# Patient Record
Sex: FEMALE | Race: WHITE | Employment: FULL TIME | URBAN - NONMETROPOLITAN AREA
[De-identification: names, ages, dates, MRNs, and addresses within clinical notes are randomized per-mention and may not be internally consistent; named-entity substitution may affect disease eponyms.]

---

## 2017-11-07 ENCOUNTER — OFFICE VISIT (OUTPATIENT)
Dept: INTERNAL MEDICINE CLINIC | Age: 27
End: 2017-11-07

## 2017-11-07 VITALS
TEMPERATURE: 97.8 F | WEIGHT: 215.2 LBS | OXYGEN SATURATION: 98 % | RESPIRATION RATE: 16 BRPM | SYSTOLIC BLOOD PRESSURE: 120 MMHG | BODY MASS INDEX: 35.81 KG/M2 | DIASTOLIC BLOOD PRESSURE: 80 MMHG | HEART RATE: 80 BPM

## 2017-11-07 DIAGNOSIS — J20.9 ACUTE BRONCHITIS, UNSPECIFIED ORGANISM: Primary | ICD-10-CM

## 2017-11-07 DIAGNOSIS — R04.0 EPISTAXIS: ICD-10-CM

## 2017-11-07 PROCEDURE — G8417 CALC BMI ABV UP PARAM F/U: HCPCS | Performed by: INTERNAL MEDICINE

## 2017-11-07 PROCEDURE — 4004F PT TOBACCO SCREEN RCVD TLK: CPT | Performed by: INTERNAL MEDICINE

## 2017-11-07 PROCEDURE — 99213 OFFICE O/P EST LOW 20 MIN: CPT | Performed by: INTERNAL MEDICINE

## 2017-11-07 PROCEDURE — G8484 FLU IMMUNIZE NO ADMIN: HCPCS | Performed by: INTERNAL MEDICINE

## 2017-11-07 PROCEDURE — G8427 DOCREV CUR MEDS BY ELIG CLIN: HCPCS | Performed by: INTERNAL MEDICINE

## 2017-11-07 RX ORDER — PROMETHAZINE HYDROCHLORIDE AND CODEINE PHOSPHATE 6.25; 1 MG/5ML; MG/5ML
5 SYRUP ORAL EVERY 4 HOURS PRN
Qty: 180 ML | Refills: 0 | Status: SHIPPED | OUTPATIENT
Start: 2017-11-07 | End: 2017-11-14

## 2017-11-07 RX ORDER — AZITHROMYCIN 250 MG/1
TABLET, FILM COATED ORAL
Qty: 1 PACKET | Refills: 0 | Status: SHIPPED | OUTPATIENT
Start: 2017-11-07 | End: 2017-11-15 | Stop reason: ALTCHOICE

## 2017-11-07 ASSESSMENT — ENCOUNTER SYMPTOMS
EYES NEGATIVE: 1
SPUTUM PRODUCTION: 1
SORE THROAT: 1
HEMOPTYSIS: 0
COUGH: 1
GASTROINTESTINAL NEGATIVE: 1
SHORTNESS OF BREATH: 0

## 2017-11-07 ASSESSMENT — PATIENT HEALTH QUESTIONNAIRE - PHQ9
SUM OF ALL RESPONSES TO PHQ QUESTIONS 1-9: 0
SUM OF ALL RESPONSES TO PHQ9 QUESTIONS 1 & 2: 0
2. FEELING DOWN, DEPRESSED OR HOPELESS: 0
1. LITTLE INTEREST OR PLEASURE IN DOING THINGS: 0

## 2017-11-07 NOTE — PROGRESS NOTES
Susanne Farah  Patient's  is 1990  Seen in office on 2017      SUBJECTIVE:  Franklin Figueroa is a 33 y. o.year old female presents today   Chief Complaint   Patient presents with    Generalized Body Aches    Cough     productive yellow in color    Headache    Epistaxis     frequently    Other     bruising easily, has pictures     Started with cold symptoms and cough about 2 weeks  Fever + 102 at time  Cough was initially dry and then became more productive  No chest pain. No SOB  Pt states she has been working all along  Diarrhea for few days. 3-4 times lose watery. Took thera flu and exederin  Had epistaxis and bruises off and on for few months  No hematuria. No blood in the stools  Pt states just finished period. Review of Systems   Constitutional: Positive for chills, fever and malaise/fatigue. Negative for weight loss. HENT: Positive for congestion, nosebleeds and sore throat. Negative for ear pain. Eyes: Negative. Respiratory: Positive for cough and sputum production. Negative for hemoptysis and shortness of breath. Cardiovascular: Negative. Negative for chest pain, palpitations and claudication. Gastrointestinal: Negative. Genitourinary: Negative. Musculoskeletal: Negative. Skin: Negative. Negative for itching and rash. Neurological: Negative. Endo/Heme/Allergies: Negative. Psychiatric/Behavioral: Negative. OBJECTIVE: /80   Pulse 80   Temp 97.8 °F (36.6 °C) (Oral)   Resp 16   Wt 215 lb 3.2 oz (97.6 kg)   SpO2 98%   BMI 35.81 kg/m²     Wt Readings from Last 3 Encounters:   17 215 lb 3.2 oz (97.6 kg)   17 229 lb (103.9 kg)   16 226 lb 3.2 oz (102.6 kg)      GENERAL:  Alert, oriented, pleasant, in no apparent distress. HEENT:  Conjunctiva pink, no scleral icterus. ENT clear. Throat is clear. NECK:  Supple. No jugular venous distention noted.  No masses felt,  CARDIOVASCULAR:  Normal S1 and S2    PULMONARY:  No respiratory distress. No wheezes or rales. ABDOMEN:  Soft and non-tender,no masses  or organomegaly. EXTREMITIES:  No cyanosis, clubbing, or significant edema. SKIN: Skin is warm and dry. No bruises noted. NEUROLOGICAL:  Cranial nerves II through XII are grossly intact. IMPRESSION:    Encounter Diagnoses   Name Primary?  Acute bronchitis, unspecified organism Yes    Epistaxis        ASSESSMENT/PLAN:    1. For bronchitis and URI will give Z-bharti and phenergan with codeine  2. Sedation precautions to be taken . Explained to pt  3. Will get labs done  4. RTO in one week  5. Off work for few days  Orders Placed This Encounter   Procedures    RAPID INFLUENZA A/B ANTIGENS    XR Chest PA and Lateral    CBC with Differential    Comprehensive Metabolic Panel    PREGNANCY, URINE    PROTIME-INR    APTT         Mediations reviewed with the patient. Continue current medications. Appropriate prescriptions are addressed. After visit steffiy provided. Follow up as directed sooner if needed. Questions answered and patient verbalizes understanding. Call for any problems, questions, or concerns. No Known Allergies  Current Outpatient Prescriptions   Medication Sig Dispense Refill    nicotine (NICODERM CQ) 7 MG/24HR Place 1 patch onto the skin every 24 hours       No current facility-administered medications for this visit. Past Medical History:   Diagnosis Date    Depression     Diabetes mellitus ([facility] [address]     type 2-diet controlled.  Group B streptococcal infection during pregnancy      (normal spontaneous vaginal delivery) 7-2-15    delivered in ambulance 12 minutes    Obesity, unspecified     Pancreatitis chronic     Has been on medication for diabetes, but changed to diet a few months ago.     Problems with hearing      Past Surgical History:   Procedure Laterality Date    TUBAL LIGATION  2015     Social History   Substance Use Topics    Smoking status: Current Every Day Smoker Packs/day: 0.50    Smokeless tobacco: Never Used      Comment: smoking one pack per week    Alcohol use 0.0 oz/week      Comment: socially       LAB REVIEW:  CBC:   Lab Results   Component Value Date    WBC 10.1 08/02/2017    HGB 12.1 08/02/2017    HCT 37.2 08/02/2017     08/02/2017     Lipids:   Lab Results   Component Value Date    CHOL 177 04/22/2013    TRIG 210 (H) 04/22/2013    HDL 30 (L) 04/22/2013    LDLDIRECT 114 (H) 04/22/2013     Renal:   Lab Results   Component Value Date    BUN 15 08/02/2017    CREATININE 0.7 08/02/2017     08/02/2017    K 3.4 08/02/2017    ALT 12 08/02/2017    AST 16 08/02/2017    GLUCOSE 139 08/02/2017     PT/INR:   Lab Results   Component Value Date    INR 1.06 04/08/2016     A1C:   Lab Results   Component Value Date    LABA1C 5.9 03/11/2016           Casey James MD, 11/7/2017 , 4:32 PM

## 2017-11-08 ENCOUNTER — HOSPITAL ENCOUNTER (OUTPATIENT)
Dept: LAB | Age: 27
Discharge: HOME | End: 2017-11-08
Attending: INTERNAL MEDICINE | Admitting: INTERNAL MEDICINE

## 2017-11-08 DIAGNOSIS — J20.9 ACUTE BRONCHITIS, UNSPECIFIED ORGANISM: ICD-10-CM

## 2017-11-08 LAB
ALBUMIN SERPL-MCNC: 4.2 GM/DL (ref 3.4–5)
ALP BLD-CCNC: 79 IU/L (ref 40–129)
ALT SERPL-CCNC: 17 U/L (ref 10–40)
ANION GAP SERPL CALCULATED.3IONS-SCNC: 10 MMOL/L (ref 4–16)
APTT: 27.8 SECONDS (ref 24–40)
AST SERPL-CCNC: 19 IU/L (ref 15–37)
BASOPHILS ABSOLUTE: 0 K/CU MM
BASOPHILS RELATIVE PERCENT: 0.2 % (ref 0–1)
BILIRUB SERPL-MCNC: 0.2 MG/DL (ref 0–1)
BUN BLDV-MCNC: 14 MG/DL (ref 6–23)
CALCIUM SERPL-MCNC: 9.4 MG/DL (ref 8.3–10.6)
CHLORIDE BLD-SCNC: 101 MMOL/L (ref 99–110)
CO2: 31 MMOL/L (ref 21–32)
CREAT SERPL-MCNC: 0.8 MG/DL (ref 0.6–1.1)
DIFFERENTIAL TYPE: ABNORMAL
EOSINOPHILS ABSOLUTE: 0.3 K/CU MM
EOSINOPHILS RELATIVE PERCENT: 3.6 % (ref 0–3)
GFR AFRICAN AMERICAN: >60 ML/MIN/1.73M2
GFR NON-AFRICAN AMERICAN: >60 ML/MIN/1.73M2
GLUCOSE BLD-MCNC: 85 MG/DL (ref 70–140)
HCT VFR BLD CALC: 41.2 % (ref 37–47)
HEMOGLOBIN: 13 GM/DL (ref 12.5–16)
IMMATURE NEUTROPHIL %: 0.2 % (ref 0–0.43)
INR BLD: 0.97 INDEX
LYMPHOCYTES ABSOLUTE: 3.7 K/CU MM
LYMPHOCYTES RELATIVE PERCENT: 42.8 % (ref 24–44)
MCH RBC QN AUTO: 29 PG (ref 27–31)
MCHC RBC AUTO-ENTMCNC: 31.6 % (ref 32–36)
MCV RBC AUTO: 91.8 FL (ref 78–100)
MONOCYTES ABSOLUTE: 0.5 K/CU MM
MONOCYTES RELATIVE PERCENT: 5.8 % (ref 0–4)
PDW BLD-RTO: 13.6 % (ref 11.7–14.9)
PLATELET # BLD: 256 K/CU MM (ref 140–440)
PMV BLD AUTO: 10 FL (ref 7.5–11.1)
POTASSIUM SERPL-SCNC: 4.3 MMOL/L (ref 3.5–5.1)
PREGNANCY, URINE: NEGATIVE
PROTHROMBIN TIME: 11.1 SECONDS (ref 10–14.3)
RAPID INFLUENZA  B AGN: NEGATIVE
RAPID INFLUENZA A AGN: NEGATIVE
RBC # BLD: 4.49 M/CU MM (ref 4.2–5.4)
SEGMENTED NEUTROPHILS ABSOLUTE COUNT: 4.1 K/CU MM
SEGMENTED NEUTROPHILS RELATIVE PERCENT: 47.4 % (ref 36–66)
SODIUM BLD-SCNC: 142 MMOL/L (ref 135–145)
SPECIFIC GRAVITY, URINE: 1.02 (ref 1–1.03)
TOTAL IMMATURE NEUTOROPHIL: 0.02 K/CU MM
TOTAL PROTEIN: 7.3 GM/DL (ref 6.4–8.2)
WBC # BLD: 8.6 K/CU MM (ref 4–10.5)

## 2017-11-15 ENCOUNTER — OFFICE VISIT (OUTPATIENT)
Dept: INTERNAL MEDICINE CLINIC | Age: 27
End: 2017-11-15

## 2017-11-15 VITALS
DIASTOLIC BLOOD PRESSURE: 74 MMHG | OXYGEN SATURATION: 93 % | SYSTOLIC BLOOD PRESSURE: 136 MMHG | TEMPERATURE: 98.4 F | RESPIRATION RATE: 12 BRPM | HEIGHT: 66 IN | WEIGHT: 215 LBS | HEART RATE: 72 BPM | BODY MASS INDEX: 34.55 KG/M2

## 2017-11-15 DIAGNOSIS — R49.0 HOARSENESS: Primary | ICD-10-CM

## 2017-11-15 DIAGNOSIS — F17.200 SMOKING: ICD-10-CM

## 2017-11-15 DIAGNOSIS — R51.9 ACUTE INTRACTABLE HEADACHE, UNSPECIFIED HEADACHE TYPE: ICD-10-CM

## 2017-11-15 PROCEDURE — 4004F PT TOBACCO SCREEN RCVD TLK: CPT | Performed by: INTERNAL MEDICINE

## 2017-11-15 PROCEDURE — G8417 CALC BMI ABV UP PARAM F/U: HCPCS | Performed by: INTERNAL MEDICINE

## 2017-11-15 PROCEDURE — G8427 DOCREV CUR MEDS BY ELIG CLIN: HCPCS | Performed by: INTERNAL MEDICINE

## 2017-11-15 PROCEDURE — G8484 FLU IMMUNIZE NO ADMIN: HCPCS | Performed by: INTERNAL MEDICINE

## 2017-11-15 PROCEDURE — 99213 OFFICE O/P EST LOW 20 MIN: CPT | Performed by: INTERNAL MEDICINE

## 2017-11-15 RX ORDER — PROMETHAZINE HYDROCHLORIDE AND CODEINE PHOSPHATE 6.25; 1 MG/5ML; MG/5ML
SYRUP ORAL
Refills: 0 | COMMUNITY
Start: 2017-11-07 | End: 2018-02-19 | Stop reason: ALTCHOICE

## 2017-11-15 NOTE — PROGRESS NOTES
Neisha Damian  Patient's  is 1990  Seen in office on 11/15/2017      SUBJECTIVE:  Gunner Pascual is a 33 y. o.year old female presents today   Chief Complaint   Patient presents with    Other     bronchitis recheck, cont w prod cough, ha, body aches    Results     labs, CXR 17    Other     request smoking patch     Pt had cough body ache etc had blood test done   flu neg  Cbc and cmp normal  Chest x ray cortney  Still has some cough  Developed hoarseness  bodyache is better  Has headache   Taking medications regularly. No side effects noted. ROS    OBJECTIVE: /74 (Site: Left Arm, Position: Sitting)   Pulse 72   Temp 98.4 °F (36.9 °C)   Resp 12   Ht 5' 6\" (1.676 m) Comment: w shoes  Wt 215 lb (97.5 kg)   SpO2 93%   BMI 34.70 kg/m²     Wt Readings from Last 3 Encounters:   11/15/17 215 lb (97.5 kg)   17 215 lb 3.2 oz (97.6 kg)   17 229 lb (103.9 kg)      GENERAL:  Alert, oriented, pleasant, in no apparent distress. HEENT:  Conjunctiva pink, no scleral icterus. ENT clear. NECK:  Supple. No jugular venous distention noted. No masses felt,  CARDIOVASCULAR:  Normal S1 and S2    PULMONARY:  No respiratory distress. No wheezes or rales. ABDOMEN:  Soft and non-tender,no masses  or organomegaly. EXTREMITIES:  No cyanosis, clubbing, or significant edema. SKIN: Skin is warm and dry. NEUROLOGICAL:  Cranial nerves II through XII are grossly intact. IMPRESSION:    Encounter Diagnoses   Name Primary?  Hoarseness Yes    Smoking     Acute intractable headache, unspecified headache type        ASSESSMENT/PLAN:    1. haorsness use lozenges  2. If not better RTO  3. Patient is a smoker. Counseled to quit smoking. Various options given. Help number 1-800 QUIT NOW  given to patient. 4. headche : 4/5 on pain scale : will order CT head. HA for 2 weeks. RTO in 1 month      Mediations reviewed with the patient. Continue current medications.   Appropriate prescriptions are addressed. After visit marlene provided. Follow up as directed sooner if needed. Questions answered and patient verbalizes understanding. Call for any problems, questions, or concerns. No Known Allergies  Current Outpatient Prescriptions   Medication Sig Dispense Refill    promethazine-codeine (PHENERGAN WITH CODEINE) 6.25-10 MG/5ML syrup TAKE 1 TEASPOONFUL BY MOUTH EVERY 4 HOURS AS NEEDED FOR COUGH  0    nicotine (NICODERM CQ) 7 MG/24HR Place 1 patch onto the skin every 24 hours       No current facility-administered medications for this visit. Past Medical History:   Diagnosis Date    Depression     Diabetes mellitus ([facility] [address]     type 2-diet controlled.  Group B streptococcal infection during pregnancy      (normal spontaneous vaginal delivery) 7--15    delivered in ambulance 12 minutes    Obesity, unspecified     Pancreatitis chronic     Has been on medication for diabetes, but changed to diet a few months ago.     Problems with hearing      Past Surgical History:   Procedure Laterality Date    TUBAL LIGATION  2015     Social History   Substance Use Topics    Smoking status: Current Every Day Smoker     Packs/day: 0.50    Smokeless tobacco: Never Used      Comment: smoking one pack per week    Alcohol use 0.0 oz/week      Comment: socially       LAB REVIEW:  CBC:   Lab Results   Component Value Date    WBC 8.6 2017    HGB 13.0 2017    HCT 41.2 2017     2017     Lipids:   Lab Results   Component Value Date    CHOL 177 2013    TRIG 210 (H) 2013    HDL 30 (L) 2013    LDLDIRECT 114 (H) 2013     Renal:   Lab Results   Component Value Date    BUN 14 2017    CREATININE 0.8 2017     2017    K 4.3 2017    ALT 17 2017    AST 19 2017    GLUCOSE 85 2017     PT/INR:   Lab Results   Component Value Date    INR 0.97 2017     A1C:   Lab Results   Component Value Date    LABA1C 5.9

## 2017-12-14 ENCOUNTER — OFFICE VISIT (OUTPATIENT)
Dept: INTERNAL MEDICINE CLINIC | Age: 27
End: 2017-12-14

## 2017-12-14 VITALS
HEART RATE: 84 BPM | DIASTOLIC BLOOD PRESSURE: 72 MMHG | OXYGEN SATURATION: 96 % | SYSTOLIC BLOOD PRESSURE: 136 MMHG | BODY MASS INDEX: 34.55 KG/M2 | TEMPERATURE: 98.2 F | HEIGHT: 66 IN | WEIGHT: 215 LBS | RESPIRATION RATE: 12 BRPM

## 2017-12-14 DIAGNOSIS — Z87.891 SMOKING HISTORY: ICD-10-CM

## 2017-12-14 DIAGNOSIS — G89.29 CHRONIC INTRACTABLE HEADACHE, UNSPECIFIED HEADACHE TYPE: ICD-10-CM

## 2017-12-14 DIAGNOSIS — R51.9 CHRONIC INTRACTABLE HEADACHE, UNSPECIFIED HEADACHE TYPE: ICD-10-CM

## 2017-12-14 DIAGNOSIS — J06.9 VIRAL UPPER RESPIRATORY TRACT INFECTION: ICD-10-CM

## 2017-12-14 DIAGNOSIS — K13.70 LESION OF BUCCAL MUCOSA: ICD-10-CM

## 2017-12-14 DIAGNOSIS — F32.89 OTHER DEPRESSION: Primary | ICD-10-CM

## 2017-12-14 PROCEDURE — G8484 FLU IMMUNIZE NO ADMIN: HCPCS | Performed by: INTERNAL MEDICINE

## 2017-12-14 PROCEDURE — G8427 DOCREV CUR MEDS BY ELIG CLIN: HCPCS | Performed by: INTERNAL MEDICINE

## 2017-12-14 PROCEDURE — 4004F PT TOBACCO SCREEN RCVD TLK: CPT | Performed by: INTERNAL MEDICINE

## 2017-12-14 PROCEDURE — G8417 CALC BMI ABV UP PARAM F/U: HCPCS | Performed by: INTERNAL MEDICINE

## 2017-12-14 PROCEDURE — 99213 OFFICE O/P EST LOW 20 MIN: CPT | Performed by: INTERNAL MEDICINE

## 2017-12-14 RX ORDER — CITALOPRAM 10 MG/1
10 TABLET ORAL DAILY
Qty: 30 TABLET | Refills: 1 | Status: SHIPPED | OUTPATIENT
Start: 2017-12-14 | End: 2018-02-19 | Stop reason: SDUPTHER

## 2017-12-14 ASSESSMENT — ENCOUNTER SYMPTOMS
RESPIRATORY NEGATIVE: 1
GASTROINTESTINAL NEGATIVE: 1
EYES NEGATIVE: 1
SINUS PAIN: 1

## 2017-12-14 NOTE — PROGRESS NOTES
Alert, oriented, pleasant, in no apparent distress. HEENT:  Conjunctiva pink, no scleral icterus. ENT clear. NECK:  Supple. No jugular venous distention noted. No masses felt,  CARDIOVASCULAR:  Normal S1 and S2    PULMONARY:  No respiratory distress. No wheezes or rales. ABDOMEN:  Soft and non-tender,no masses  or organomegaly. EXTREMITIES:  No cyanosis, clubbing, or significant edema. SKIN: Skin is warm and dry. NEUROLOGICAL:  Cranial nerves II through XII are grossly intact. IMPRESSION:    Encounter Diagnoses   Name Primary?  Other depression Yes    Viral upper respiratory tract infection     Chronic intractable headache, unspecified headache type     Lesion of buccal mucosa     Smoking history        ASSESSMENT/PLAN:    1. Other depression  Patient has depression and crying spells. Recommended to get counseling. Various options given. Patient is [facility]. She will call me for referral if she wants to go to private counseling. Will start patient on Celexa 10 mg daily  Side effects explained to the patient  If increased depression or suicidal thoughts patient should call      2. Viral upper respiratory tract infection  Ear nose and throat examination is normal  May use Cepacol lozenges  If symptoms get worse patient should call    3. Chronic intractable headache, unspecified headache type  Patient has headaches off and on  Advise her to get CT scan of the head     4. Lesion left buccal mucosa. Patient states she has an appointment to oropharyngeal surgeon. She does not know the name of the doctor. 5.Patient is a smoker. Counseled to quit smoking. Various options given. Help number 1-100 QUIT NOW  given to patient. RTO in 1 month      Mediations reviewed with the patient. Continue current medications. Appropriate prescriptions are addressed. After visit summery provided. Follow up as directed sooner if needed.   Questions answered and patient verbalizes understanding. Call for any problems, questions, or concerns. No Known Allergies  Current Outpatient Prescriptions   Medication Sig Dispense Refill      30 tablet 1    promethazine-codeine (PHENERGAN WITH CODEINE) 6.25-10 MG/5ML syrup TAKE 1 TEASPOONFUL BY MOUTH EVERY 4 HOURS AS NEEDED FOR COUGH  0    nicotine (NICODERM CQ) 7 MG/24HR Place 1 patch onto the skin every 24 hours       No current facility-administered medications for this visit. Past Medical History:   Diagnosis Date    Depression     Diabetes mellitus ([facility] [address]     type 2-diet controlled.  Group B streptococcal infection during pregnancy      (normal spontaneous vaginal delivery) 715    delivered in ambulance 12 minutes    Obesity, unspecified     Pancreatitis chronic     Has been on medication for diabetes, but changed to diet a few months ago.     Problems with hearing      Past Surgical History:   Procedure Laterality Date    TUBAL LIGATION  2015     Social History   Substance Use Topics    Smoking status: Current Every Day Smoker     Packs/day: 0.50    Smokeless tobacco: Never Used      Comment: smoking one pack per week    Alcohol use 0.0 oz/week      Comment: socially       LAB REVIEW:  CBC:   Lab Results   Component Value Date    WBC 8.6 2017    HGB 13.0 2017    HCT 41.2 2017     2017     Lipids:   Lab Results   Component Value Date    CHOL 177 2013    TRIG 210 (H) 2013    HDL 30 (L) 2013    LDLDIRECT 114 (H) 2013     Renal:   Lab Results   Component Value Date    BUN 14 2017    CREATININE 0.8 2017     2017    K 4.3 2017    ALT 17 2017    AST 19 2017    GLUCOSE 85 2017     PT/INR:   Lab Results   Component Value Date    INR 0.97 2017     A1C:   Lab Results   Component Value Date    LABA1C 5.9 2016           Tere Holbrook MD, 2017 , 5:14 PM

## 2018-01-18 ENCOUNTER — TELEPHONE (OUTPATIENT)
Dept: INTERNAL MEDICINE CLINIC | Age: 28
End: 2018-01-18

## 2018-01-19 ENCOUNTER — TELEPHONE (OUTPATIENT)
Dept: INTERNAL MEDICINE CLINIC | Age: 28
End: 2018-01-19

## 2018-01-23 ENCOUNTER — OFFICE VISIT (OUTPATIENT)
Dept: INTERNAL MEDICINE CLINIC | Age: 28
End: 2018-01-23

## 2018-01-23 VITALS
BODY MASS INDEX: 36.81 KG/M2 | OXYGEN SATURATION: 98 % | SYSTOLIC BLOOD PRESSURE: 130 MMHG | WEIGHT: 221.2 LBS | DIASTOLIC BLOOD PRESSURE: 70 MMHG | RESPIRATION RATE: 16 BRPM | HEART RATE: 83 BPM | TEMPERATURE: 98.8 F

## 2018-01-23 DIAGNOSIS — K62.5 RECTAL BLEEDING: Primary | ICD-10-CM

## 2018-01-23 DIAGNOSIS — L02.91 ABSCESS: ICD-10-CM

## 2018-01-23 PROCEDURE — 99213 OFFICE O/P EST LOW 20 MIN: CPT | Performed by: INTERNAL MEDICINE

## 2018-01-23 PROCEDURE — G8417 CALC BMI ABV UP PARAM F/U: HCPCS | Performed by: INTERNAL MEDICINE

## 2018-01-23 PROCEDURE — 4004F PT TOBACCO SCREEN RCVD TLK: CPT | Performed by: INTERNAL MEDICINE

## 2018-01-23 PROCEDURE — G8484 FLU IMMUNIZE NO ADMIN: HCPCS | Performed by: INTERNAL MEDICINE

## 2018-01-23 PROCEDURE — G8427 DOCREV CUR MEDS BY ELIG CLIN: HCPCS | Performed by: INTERNAL MEDICINE

## 2018-02-02 ENCOUNTER — TELEPHONE (OUTPATIENT)
Dept: INTERNAL MEDICINE CLINIC | Age: 28
End: 2018-02-02

## 2018-02-19 ENCOUNTER — OFFICE VISIT (OUTPATIENT)
Dept: INTERNAL MEDICINE CLINIC | Age: 28
End: 2018-02-19

## 2018-02-19 VITALS
SYSTOLIC BLOOD PRESSURE: 130 MMHG | WEIGHT: 216.8 LBS | OXYGEN SATURATION: 97 % | DIASTOLIC BLOOD PRESSURE: 86 MMHG | HEART RATE: 86 BPM | TEMPERATURE: 98.8 F | RESPIRATION RATE: 16 BRPM | BODY MASS INDEX: 36.08 KG/M2

## 2018-02-19 DIAGNOSIS — F32.A DEPRESSION, UNSPECIFIED DEPRESSION TYPE: ICD-10-CM

## 2018-02-19 DIAGNOSIS — F41.9 ANXIETY: ICD-10-CM

## 2018-02-19 DIAGNOSIS — J20.9 ACUTE BRONCHITIS, UNSPECIFIED ORGANISM: Primary | ICD-10-CM

## 2018-02-19 DIAGNOSIS — R68.89 FLU-LIKE SYMPTOMS: ICD-10-CM

## 2018-02-19 LAB
INFLUENZA A ANTIBODY: NEGATIVE
INFLUENZA B ANTIBODY: NEGATIVE

## 2018-02-19 PROCEDURE — G8427 DOCREV CUR MEDS BY ELIG CLIN: HCPCS | Performed by: INTERNAL MEDICINE

## 2018-02-19 PROCEDURE — G8417 CALC BMI ABV UP PARAM F/U: HCPCS | Performed by: INTERNAL MEDICINE

## 2018-02-19 PROCEDURE — 87804 INFLUENZA ASSAY W/OPTIC: CPT | Performed by: INTERNAL MEDICINE

## 2018-02-19 PROCEDURE — 99213 OFFICE O/P EST LOW 20 MIN: CPT | Performed by: INTERNAL MEDICINE

## 2018-02-19 PROCEDURE — G8484 FLU IMMUNIZE NO ADMIN: HCPCS | Performed by: INTERNAL MEDICINE

## 2018-02-19 PROCEDURE — 4004F PT TOBACCO SCREEN RCVD TLK: CPT | Performed by: INTERNAL MEDICINE

## 2018-02-19 RX ORDER — ALPRAZOLAM 0.25 MG/1
0.25 TABLET ORAL 2 TIMES DAILY PRN
Qty: 10 TABLET | Refills: 0 | Status: SHIPPED | OUTPATIENT
Start: 2018-02-19 | End: 2018-02-26

## 2018-02-19 RX ORDER — CITALOPRAM 20 MG/1
20 TABLET ORAL DAILY
Qty: 30 TABLET | Refills: 1 | Status: SHIPPED | OUTPATIENT
Start: 2018-02-19 | End: 2018-02-20 | Stop reason: SDUPTHER

## 2018-02-19 RX ORDER — CEFDINIR 300 MG/1
300 CAPSULE ORAL 2 TIMES DAILY
Qty: 20 CAPSULE | Refills: 0 | Status: SHIPPED | OUTPATIENT
Start: 2018-02-19 | End: 2018-02-20 | Stop reason: SDUPTHER

## 2018-02-19 RX ORDER — DEXTROMETHORPHAN HYDROBROMIDE AND PROMETHAZINE HYDROCHLORIDE 15; 6.25 MG/5ML; MG/5ML
5 SYRUP ORAL 4 TIMES DAILY PRN
Qty: 180 ML | Refills: 0 | Status: SHIPPED | OUTPATIENT
Start: 2018-02-19 | End: 2018-02-26

## 2018-02-19 NOTE — PROGRESS NOTES
Urmila Trujlilo  Patient's  is 1990  Seen in office on 2018      SUBJECTIVE:  Aramis Curtis is a 33 y. o.year old female presents today   Chief Complaint   Patient presents with    Fever     all since friday     Generalized Body Aches    Emesis    Chest Congestion    Cough     productive grean     Patient had fever and body ache and nausea feeling along with cough and chest congestion for the last 3 days. Patient states her fever was high  Has generalized body ache  Has nausea but no vomiting  Has cough and congestion with productive green sputum. No urinary symptoms. No headaches  Taking medications regularly. No side effects noted. Patient states she is undergoing divorce  ROS    OBJECTIVE: /86   Pulse 86   Temp 98.8 °F (37.1 °C) (Oral)   Resp 16   Wt 216 lb 12.8 oz (98.3 kg)   LMP 2017 (Approximate)   SpO2 97%   BMI 36.08 kg/m²     Wt Readings from Last 3 Encounters:   18 216 lb 12.8 oz (98.3 kg)   18 212 lb (96.2 kg)   18 221 lb 3.2 oz (100.3 kg)      GENERAL:  Alert, oriented, pleasant, in no apparent distress. HEENT:  Conjunctiva pink, no scleral icterus. ENT clear. Ear canals normal.  Tympanic membranes normal.  NECK:  Supple. No jugular venous distention noted. No masses felt,  CARDIOVASCULAR:  Normal S1 and S2    PULMONARY:  No respiratory distress. No wheezes or rales. ABDOMEN:  Soft and non-tender,no masses  or organomegaly. EXTREMITIES:  No cyanosis, clubbing, or significant edema. SKIN: Skin is warm and dry. NEUROLOGICAL:  Cranial nerves II through XII are grossly intact. IMPRESSION:    Encounter Diagnoses   Name Primary?  Acute bronchitis, unspecified organism Yes    Flu-like symptoms     Depression, unspecified depression type     Anxiety        ASSESSMENT/PLAN:  1. Influenza A and B are negative  2. Patient has acute bronchitis we'll treat with Omnicef and Phenergan DM  3. Patient has depression and anxiety.   Will  Group B streptococcal infection during pregnancy      (normal spontaneous vaginal delivery) 7-2-15    delivered in ambulance 12 minutes    Obesity, unspecified     Pancreatitis chronic     Has been on medication for diabetes, but changed to diet a few months ago.     Problems with hearing      Past Surgical History:   Procedure Laterality Date    HEMORRHOID SURGERY      TUBAL LIGATION  2015    VAGINA SURGERY       Social History   Substance Use Topics    Smoking status: Current Every Day Smoker     Packs/day: 0.50    Smokeless tobacco: Never Used      Comment: smoking one pack per week    Alcohol use 0.0 oz/week      Comment: socially       LAB REVIEW:  CBC:   Lab Results   Component Value Date    WBC 10.8 2018    HGB 12.1 2018    HCT 38.1 2018     2018     Lipids:   Lab Results   Component Value Date    CHOL 177 2013    TRIG 210 (H) 2013    HDL 30 (L) 2013    LDLDIRECT 114 (H) 2013     Renal:   Lab Results   Component Value Date    BUN 16 2018    CREATININE 0.7 2018     2018    K 3.7 2018    ALT 19 2018    AST 17 2018    GLUCOSE 100 2018     PT/INR:   Lab Results   Component Value Date    INR 0.97 2017     A1C:   Lab Results   Component Value Date    LABA1C 5.9 2016           Tomasa Andrade MD, 2018 , 2:24 PM

## 2018-02-20 ENCOUNTER — TELEPHONE (OUTPATIENT)
Dept: INTERNAL MEDICINE CLINIC | Age: 28
End: 2018-02-20

## 2018-02-20 RX ORDER — CITALOPRAM 20 MG/1
20 TABLET ORAL DAILY
Qty: 30 TABLET | Refills: 1 | Status: SHIPPED | OUTPATIENT
Start: 2018-02-20 | End: 2018-09-12

## 2018-02-20 RX ORDER — IBUPROFEN 800 MG/1
800 TABLET ORAL EVERY 8 HOURS PRN
Qty: 90 TABLET | Refills: 0 | Status: SHIPPED | OUTPATIENT
Start: 2018-02-20 | End: 2018-09-12 | Stop reason: ALTCHOICE

## 2018-02-20 RX ORDER — CEFDINIR 300 MG/1
300 CAPSULE ORAL 2 TIMES DAILY
Qty: 18 CAPSULE | Refills: 0 | Status: SHIPPED | OUTPATIENT
Start: 2018-02-20 | End: 2018-03-01

## 2018-05-02 ENCOUNTER — HOSPITAL ENCOUNTER (OUTPATIENT)
Dept: LAB | Age: 28
Discharge: HOME | End: 2018-05-02
Attending: INTERNAL MEDICINE | Admitting: INTERNAL MEDICINE

## 2018-05-02 DIAGNOSIS — J20.9 ACUTE BRONCHITIS, UNSPECIFIED ORGANISM: ICD-10-CM

## 2018-05-02 LAB
ALBUMIN SERPL-MCNC: 4.6 GM/DL (ref 3.4–5)
ALP BLD-CCNC: 91 IU/L (ref 40–129)
ALT SERPL-CCNC: 15 U/L (ref 10–40)
ANION GAP SERPL CALCULATED.3IONS-SCNC: 13 MMOL/L (ref 4–16)
AST SERPL-CCNC: 18 IU/L (ref 15–37)
BASOPHILS ABSOLUTE: 0 K/CU MM
BASOPHILS RELATIVE PERCENT: 0.3 % (ref 0–1)
BILIRUB SERPL-MCNC: 0.3 MG/DL (ref 0–1)
BUN BLDV-MCNC: 14 MG/DL (ref 6–23)
CALCIUM SERPL-MCNC: 9.5 MG/DL (ref 8.3–10.6)
CHLORIDE BLD-SCNC: 102 MMOL/L (ref 99–110)
CO2: 28 MMOL/L (ref 21–32)
CREAT SERPL-MCNC: 0.8 MG/DL (ref 0.6–1.1)
DIFFERENTIAL TYPE: ABNORMAL
EOSINOPHILS ABSOLUTE: 0.2 K/CU MM
EOSINOPHILS RELATIVE PERCENT: 1.8 % (ref 0–3)
GFR AFRICAN AMERICAN: >60 ML/MIN/1.73M2
GFR NON-AFRICAN AMERICAN: >60 ML/MIN/1.73M2
GLUCOSE BLD-MCNC: 129 MG/DL (ref 70–99)
HCT VFR BLD CALC: 40.7 % (ref 37–47)
HEMOGLOBIN: 12.9 GM/DL (ref 12.5–16)
IMMATURE NEUTROPHIL %: 0.2 % (ref 0–0.43)
LYMPHOCYTES ABSOLUTE: 3.1 K/CU MM
LYMPHOCYTES RELATIVE PERCENT: 33.2 % (ref 24–44)
MCH RBC QN AUTO: 28.7 PG (ref 27–31)
MCHC RBC AUTO-ENTMCNC: 31.7 % (ref 32–36)
MCV RBC AUTO: 90.6 FL (ref 78–100)
MONOCYTES ABSOLUTE: 0.6 K/CU MM
MONOCYTES RELATIVE PERCENT: 6.6 % (ref 0–4)
PDW BLD-RTO: 13.5 % (ref 11.7–14.9)
PLATELET # BLD: 265 K/CU MM (ref 140–440)
PMV BLD AUTO: 9.6 FL (ref 7.5–11.1)
POTASSIUM SERPL-SCNC: 3.7 MMOL/L (ref 3.5–5.1)
RBC # BLD: 4.49 M/CU MM (ref 4.2–5.4)
SEGMENTED NEUTROPHILS ABSOLUTE COUNT: 5.5 K/CU MM
SEGMENTED NEUTROPHILS RELATIVE PERCENT: 57.9 % (ref 36–66)
SODIUM BLD-SCNC: 143 MMOL/L (ref 135–145)
TOTAL IMMATURE NEUTOROPHIL: 0.02 K/CU MM
TOTAL PROTEIN: 7.8 GM/DL (ref 6.4–8.2)
WBC # BLD: 9.4 K/CU MM (ref 4–10.5)

## 2018-09-12 ENCOUNTER — OFFICE VISIT (OUTPATIENT)
Dept: INTERNAL MEDICINE CLINIC | Age: 28
End: 2018-09-12

## 2018-09-12 VITALS
DIASTOLIC BLOOD PRESSURE: 80 MMHG | HEART RATE: 90 BPM | WEIGHT: 216 LBS | SYSTOLIC BLOOD PRESSURE: 128 MMHG | HEIGHT: 66 IN | TEMPERATURE: 98 F | OXYGEN SATURATION: 99 % | BODY MASS INDEX: 34.72 KG/M2

## 2018-09-12 DIAGNOSIS — J02.9 ACUTE PHARYNGITIS, UNSPECIFIED ETIOLOGY: ICD-10-CM

## 2018-09-12 DIAGNOSIS — R68.89 FLU-LIKE SYMPTOMS: Primary | ICD-10-CM

## 2018-09-12 PROCEDURE — 99213 OFFICE O/P EST LOW 20 MIN: CPT | Performed by: INTERNAL MEDICINE

## 2018-09-12 RX ORDER — AZITHROMYCIN 250 MG/1
TABLET, FILM COATED ORAL
Qty: 1 PACKET | Refills: 0 | Status: SHIPPED | OUTPATIENT
Start: 2018-09-12 | End: 2019-04-17 | Stop reason: ALTCHOICE

## 2018-09-12 RX ORDER — OSELTAMIVIR PHOSPHATE 75 MG/1
75 CAPSULE ORAL 2 TIMES DAILY
Qty: 10 CAPSULE | Refills: 0 | Status: SHIPPED | OUTPATIENT
Start: 2018-09-12 | End: 2018-09-17

## 2018-09-12 NOTE — PROGRESS NOTES
Gideon Riedel  Patient's  is 1990  Seen in office on 2018      SUBJECTIVE:  Anand Gaxiola is a 33 y. o.year old female presents today   Chief Complaint   Patient presents with    Pharyngitis    Nausea & Vomiting     Was feeling well this am  Now has sore throat , mild hoarsness  Generalized body ache. Patient's 1year-old daughter is diagnosed with influenza upper patient  [place]  No abdominal pain. No diarrhea. Taking medications regularly. No side effects noted. ROS    OBJECTIVE: /80 (Site: Left Upper Arm, Position: Sitting, Cuff Size: Medium Adult)   Pulse 90   Temp 98 °F (36.7 °C)   Ht 5' 6\" (1.676 m)   Wt 216 lb (98 kg)   SpO2 99%   BMI 34.86 kg/m²     Wt Readings from Last 3 Encounters:   18 216 lb (98 kg)   18 190 lb (86.2 kg)   18 216 lb 12.8 oz (98.3 kg)      GENERAL:  Alert, oriented, pleasant, in no apparent distress. HEENT:  Conjunctiva pink, no scleral icterus. ENT clear. Throat slightly red. Ear canals normal.  NECK:  Supple. No jugular venous distention noted. No masses felt,  CARDIOVASCULAR:  Normal S1 and S2    PULMONARY:  No respiratory distress. No wheezes or rales. ABDOMEN:  Soft and non-tender,no masses  or organomegaly. EXTREMITIES:  No cyanosis, clubbing, or significant edema. SKIN: Skin is warm and dry. NEUROLOGICAL:  Cranial nerves II through XII are grossly intact. IMPRESSION:    Encounter Diagnoses   Name Primary?  Flu-like symptoms Yes    Acute pharyngitis, unspecified etiology        ASSESSMENT/PLAN:  1. Rapid flu test is negative  2. As patient's daughter has follow and patient has flulike symptoms Will treat as Flu, with Tamiflu 75 mg twice a day for 5 days. 3.  Patient also pharyngitis. Give Z-Yusef  4. Symptoms get worse patient should call. 5.  Take Tylenol when necessary  6. Increase fluid intake  Return to office when necessary    Mediations reviewed with the patient. Continue current medications.   Appropriate prescriptions are addressed. After visit steffiy provided. Follow up as directed sooner if needed. Questions answered and patient verbalizes understanding. Call for any problems, questions, or concerns. No Known Allergies  Current Outpatient Prescriptions   Medication Sig Dispense Refill    citalopram (CELEXA) 20 MG tablet Take 1 tablet by mouth daily 30 tablet 1    ibuprofen (ADVIL;MOTRIN) 800 MG tablet Take 1 tablet by mouth every 8 hours as needed for Pain 90 tablet 0    docusate sodium (COLACE) 100 MG capsule Take 1 capsule by mouth 2 times daily 30 capsule 0    nicotine (NICODERM CQ) 7 MG/24HR Place 1 patch onto the skin every 24 hours       No current facility-administered medications for this visit. Past Medical History:   Diagnosis Date    Depression     Diabetes mellitus ([facility] [address]     type 2-diet controlled.  Group B streptococcal infection during pregnancy      (normal spontaneous vaginal delivery) 7-2-15    delivered in ambulance 12 minutes    Obesity, unspecified     Pancreatitis chronic     Has been on medication for diabetes, but changed to diet a few months ago.     Problems with hearing      Past Surgical History:   Procedure Laterality Date    HEMORRHOID SURGERY      TUBAL LIGATION  2015    VAGINA SURGERY       Social History   Substance Use Topics    Smoking status: Current Every Day Smoker     Packs/day: 0.50    Smokeless tobacco: Never Used      Comment: smoking one pack per week    Alcohol use 0.0 oz/week      Comment: socially       LAB REVIEW:  CBC:   Lab Results   Component Value Date    WBC 9.4 2018    HGB 12.9 2018    HCT 40.7 2018     2018     Lipids:   Lab Results   Component Value Date    CHOL 177 2013    TRIG 210 (H) 2013    HDL 30 (L) 2013    LDLDIRECT 114 (H) 2013     Renal:   Lab Results   Component Value Date    BUN 14 2018    CREATININE 0.8 2018     2018    K 3.7 05/02/2018    ALT 15 05/02/2018    AST 18 05/02/2018    GLUCOSE 129 05/02/2018     PT/INR:   Lab Results   Component Value Date    INR 0.97 11/08/2017     A1C:   Lab Results   Component Value Date    LABA1C 5.9 03/11/2016           Jassi Goss MD, 9/12/2018 , 3:55 PM

## 2018-09-17 ENCOUNTER — OFFICE VISIT (OUTPATIENT)
Dept: INTERNAL MEDICINE CLINIC | Age: 28
End: 2018-09-17

## 2018-09-17 VITALS
OXYGEN SATURATION: 98 % | DIASTOLIC BLOOD PRESSURE: 80 MMHG | TEMPERATURE: 98.1 F | SYSTOLIC BLOOD PRESSURE: 120 MMHG | RESPIRATION RATE: 12 BRPM | WEIGHT: 223 LBS | HEART RATE: 84 BPM | BODY MASS INDEX: 35.99 KG/M2

## 2018-09-17 DIAGNOSIS — J20.9 ACUTE BRONCHITIS, UNSPECIFIED ORGANISM: Primary | ICD-10-CM

## 2018-09-17 DIAGNOSIS — J01.00 ACUTE MAXILLARY SINUSITIS, RECURRENCE NOT SPECIFIED: ICD-10-CM

## 2018-09-17 PROCEDURE — 99213 OFFICE O/P EST LOW 20 MIN: CPT | Performed by: INTERNAL MEDICINE

## 2018-09-17 PROCEDURE — 4004F PT TOBACCO SCREEN RCVD TLK: CPT | Performed by: INTERNAL MEDICINE

## 2018-09-17 PROCEDURE — G8417 CALC BMI ABV UP PARAM F/U: HCPCS | Performed by: INTERNAL MEDICINE

## 2018-09-17 PROCEDURE — G8427 DOCREV CUR MEDS BY ELIG CLIN: HCPCS | Performed by: INTERNAL MEDICINE

## 2018-09-17 RX ORDER — PREDNISONE 20 MG/1
20 TABLET ORAL DAILY
Qty: 5 TABLET | Refills: 0 | Status: SHIPPED | OUTPATIENT
Start: 2018-09-17 | End: 2018-09-22

## 2018-09-17 RX ORDER — CEFDINIR 300 MG/1
300 CAPSULE ORAL 2 TIMES DAILY
Qty: 20 CAPSULE | Refills: 0 | Status: SHIPPED | OUTPATIENT
Start: 2018-09-17 | End: 2018-09-27

## 2018-09-17 NOTE — PROGRESS NOTES
for 5 days  Continue Mucinex and cough suppressant from [facility]  Increase fluid intake  Get a chest x-ray to rule out any pneumonia  Patient to call back in 2 days and report her condition  Return to office when necessary    Orders Placed This Encounter   Medications    cefdinir (OMNICEF) 300 MG capsule     Sig: Take 1 capsule by mouth 2 times daily for 10 days     Dispense:  20 capsule     Refill:  0    predniSONE (DELTASONE) 20 MG tablet     Sig: Take 1 tablet by mouth daily for 5 days     Dispense:  5 tablet     Refill:  0         Mediations reviewed with the patient. Continue current medications. Appropriate prescriptions are addressed. After visit steffiy provided. Follow up as directed sooner if needed. Questions answered and patient verbalizes understanding. Call for any problems, questions, or concerns. No Known Allergies  Current Outpatient Prescriptions   Medication Sig Dispense Refill    oseltamivir (TAMIFLU) 75 MG capsule Take 1 capsule by mouth 2 times daily for 5 days 10 capsule 0    azithromycin (ZITHROMAX) 250 MG tablet Take 2 tabs (500 mg) on Day 1, and take 1 tab (250 mg) on days 2 through 5. 1 packet 0     No current facility-administered medications for this visit. Past Medical History:   Diagnosis Date    Depression     Diabetes mellitus ([facility] [address]     type 2-diet controlled.  Group B streptococcal infection during pregnancy      (normal spontaneous vaginal delivery) 7-2-15    delivered in ambulance 12 minutes    Obesity, unspecified     Pancreatitis chronic     Has been on medication for diabetes, but changed to diet a few months ago.     Problems with hearing      Past Surgical History:   Procedure Laterality Date    HEMORRHOID SURGERY      TUBAL LIGATION  2015    VAGINA SURGERY       Social History   Substance Use Topics    Smoking status: Current Every Day Smoker     Packs/day: 0.50    Smokeless tobacco: Never Used      Comment: smoking one pack per week   

## 2019-03-25 ENCOUNTER — HOSPITAL ENCOUNTER (OUTPATIENT)
Dept: MRI IMAGING | Age: 29
Discharge: HOME | End: 2019-03-25
Payer: COMMERCIAL

## 2019-03-25 DIAGNOSIS — S92.902G: ICD-10-CM

## 2019-03-25 PROCEDURE — 73718 MRI LOWER EXTREMITY W/O DYE: CPT

## 2019-04-17 ENCOUNTER — OFFICE VISIT (OUTPATIENT)
Dept: FAMILY MEDICINE CLINIC | Age: 29
End: 2019-04-17
Payer: COMMERCIAL

## 2019-04-17 ENCOUNTER — TELEPHONE (OUTPATIENT)
Dept: FAMILY MEDICINE CLINIC | Age: 29
End: 2019-04-17

## 2019-04-17 VITALS
BODY MASS INDEX: 37.29 KG/M2 | HEIGHT: 67 IN | WEIGHT: 237.6 LBS | HEART RATE: 85 BPM | SYSTOLIC BLOOD PRESSURE: 122 MMHG | DIASTOLIC BLOOD PRESSURE: 72 MMHG | RESPIRATION RATE: 14 BRPM

## 2019-04-17 DIAGNOSIS — E66.9 CLASS 2 OBESITY WITHOUT SERIOUS COMORBIDITY WITH BODY MASS INDEX (BMI) OF 37.0 TO 37.9 IN ADULT, UNSPECIFIED OBESITY TYPE: ICD-10-CM

## 2019-04-17 DIAGNOSIS — Z72.0 TOBACCO USE: ICD-10-CM

## 2019-04-17 DIAGNOSIS — R63.5 WEIGHT GAIN: ICD-10-CM

## 2019-04-17 DIAGNOSIS — R73.03 PRE-DIABETES: ICD-10-CM

## 2019-04-17 DIAGNOSIS — K13.70 LESION OF BUCCAL MUCOSA: Primary | ICD-10-CM

## 2019-04-17 LAB — HBA1C MFR BLD: 5.7 %

## 2019-04-17 PROCEDURE — G8417 CALC BMI ABV UP PARAM F/U: HCPCS | Performed by: PHYSICIAN ASSISTANT

## 2019-04-17 PROCEDURE — 99203 OFFICE O/P NEW LOW 30 MIN: CPT | Performed by: PHYSICIAN ASSISTANT

## 2019-04-17 PROCEDURE — 4004F PT TOBACCO SCREEN RCVD TLK: CPT | Performed by: PHYSICIAN ASSISTANT

## 2019-04-17 PROCEDURE — G8427 DOCREV CUR MEDS BY ELIG CLIN: HCPCS | Performed by: PHYSICIAN ASSISTANT

## 2019-04-17 PROCEDURE — 83036 HEMOGLOBIN GLYCOSYLATED A1C: CPT | Performed by: PHYSICIAN ASSISTANT

## 2019-04-17 ASSESSMENT — PATIENT HEALTH QUESTIONNAIRE - PHQ9
1. LITTLE INTEREST OR PLEASURE IN DOING THINGS: 0
SUM OF ALL RESPONSES TO PHQ QUESTIONS 1-9: 0
2. FEELING DOWN, DEPRESSED OR HOPELESS: 0
SUM OF ALL RESPONSES TO PHQ9 QUESTIONS 1 & 2: 0
SUM OF ALL RESPONSES TO PHQ QUESTIONS 1-9: 0

## 2019-04-17 NOTE — LETTER
MEDICATION AGREEMENT     [facility]  8/3/1930      For certain conditions, multiple classes of medications may be used to help better manage your symptoms, and to improve your ability to function at home, work and in social settings. However, these medications do have risks, which will be discussed with you, including addiction and dependency. The following prescribed medications need frequent monitoring and will require you to partner and assist in your healthcare. Medication  Dose, instructions and quantity as indicated on current prescription bottle Diagnosis/Reason(s) for Taking Category     qsymia   Obesity             f                 Benefits and goals of Controlled Substance Medications: There are two potential goals for your treatment: (1) decreased pain and suffering (2) improved daily life functions. There are many possible treatments for your chronic condition(s), and, in addition to controlled substance medications, we will try alternatives such as physical therapy, yoga, massage, home daily exercise, meditation, relaxation techniques, injections, chiropractic manipulations, surgery, cognitive therapy, hypnosis and many medications that are not habit-forming. Use of controlled substance medications may be helpful, but they are unlikely to resolve all of your symptoms or restore all function. Risks of Controlled Substance Medications:    Opioid pain medications: These medications can lead to problems such as addiction/dependence, sedation, lightheadedness/dizziness, memory issues, falls, constipation, nausea, or vomiting. They may also impair the ability to drive or operate machinery. Additionally, these medications may lower testosterone levels, leading to loss of bone strength, stamina and sex drive.   They may cause problems with breathing, sleep apnea and reduced coughing, which are especially dangerous for patients with lung disease. Overdose or dangerous interactions with alcohol and other medications may occur, leading to death. Hyperalgesia may develop, in which patients receiving opioids for the treatment of pain may actually become more sensitive to certain painful stimuli, and in some cases, experience pain from ordinarily non-painful stimuli. Women between the ages of 14-53 who could become pregnant should carefully weigh the risks and benefits of opioids with their physicians, as these medications increase the risk of pregnancy complications, including miscarriage,  delivery and stillbirth. It is also possible for babies to be born addicted to opioids. Opioid dependence withdrawal symptoms may include; feelings of uneasiness, increased pain, irritability, belly pain, diarrhea, sweats and goose-flesh. Benzodiazepines and non-benzodiazepine sleep medications: These medications can lead to problems such as addiction/dependence, sedation, fatigue, lightheadedness, dizziness, incoordination, falls, depression, hallucinations, and impaired judgment, memory and concentration. The ability to drive and operate machinery may also be affected. Abnormal sleep-related behaviors have been reported, including sleep walking, driving, making telephone calls, eating, or having sex while not fully awake. These medications can suppress breathing and worsen sleep apnea, particularly when combined with alcohol or other sedating medications, potentially leading to death. Dependence withdrawal symptoms may include tremors, anxiety, hallucinations and seizures. Stimulants:  Common adverse effects include addiction/dependence, increased blood pressure and heart rate, decreased appetite, nausea, involuntary weight loss, insomnia, irritability, and headaches.   These risks may increase when these medications are combined with other stimulants, such as caffeine pills or energy drinks, certain weight loss supplements and oral decongestants. Dependence withdrawal symptoms may include depressed mood, loss of interest, suicidal thoughts, anxiety, fatigue, appetite changes and agitation. Testosterone replacement therapy:  Potential side effects include increased risk of stroke and heart attack, blood clots, increased blood pressure, increased cholesterol, enlarged prostate, sleep apnea, irritability/aggression and other mood disorders, and decreased fertility. Other:     1. I understand that I have the following responsibilities:  · I will take medications at the dose and frequency prescribed. · I will not increase or change how I take my medications without the approval of the health care provider who signs this Medication Agreement. · I will arrange for refills at the prescribed interval ONLY during regular office hours. I will not ask for refills earlier than agreed, after-hours, on holidays or on weekends. · I will obtain all refills for these medications at  ·  ____________________________________  pharmacy (phone number  ·  ________________________), with full consent for my provider and pharmacist to exchange information in writing or verbally. · I will not request any pain medications or controlled substances from other providers and will inform this provider of all other medications I am taking. · I will inform my other health care providers that I am taking these medications and of the existence of this [facility] [address]. In the event of an emergency, I will provide the same information to the emergency department providers. · I will protect my prescriptions and medications. I understand that lost or misplaced prescriptions will not be replaced. · I will keep medications only for my own use and will not share them with others. I will keep all medications away from children. · I agree to participate in any medical, psychological or psychiatric assessments recommended by my provider. · I will actively participate in any program designed to improve function, including social, physical, psychological and daily or work activities. 2. I will not use illegal or street drugs or another person's prescription. If I have an addiction problem with drugs or alcohol and my provider asks me to enter a program to address this issue, I agree to follow through. Such programs may include:  · 12-Step program and securing a sponsor  · Individual counseling   · Inpatient or outpatient treatment  · Other:_____________________________________________________________________________________________________________________________________________    If in treatment, I will request that a copy of the programs initial evaluation and treatment recommendations be sent to this provider and will not expect refills until that is received. I will also request written monthly updates be sent to this provider to verify my continuing treatment. 3. I will consent to drug screening upon my providers request to assure I am only taking the prescribed drugs, described in this MEDICATION AGREEMENT. I understand that a drug screen is a laboratory test in which a sample of my urine, blood or saliva is checked to see what drugs I have been taking. 4. I agree that I will treat the providers and staff at this office with respect at all times. I will keep all of my scheduled appointments, but if I need to cancel my appointment, I will do so a minimum of 24 hours before it is scheduled. 5. I understand that this provider may stop prescribing the medications listed if:  · I do not show any improvement in pain, or my activity has not improved. · I develop rapid tolerance or loss of improvement, as described in my treatment plan. · I develop significant side effects from the medication.   · My behavior is inconsistent with the responsibilities outlined above, which may also result in my being prevented from receiving further care from this office. · Other:____________________________________________________________________    AGREEMENT:    I have read the above and have had all of my questions answered. For chronic disease management, I know that my symptoms can be managed with many types of treatments. A chronic medication trial may be part of my treatment, but I must be an active participant in my care. Medication therapy is only one part of my symptom management plan. In some cases, there may be limited scientific evidence to support the chronic use of certain medications to improve symptoms and daily function. Furthermore, in certain circumstances, there may be scientific information that suggests that use of chronic controlled substances may actually worsen my symptoms and increase my risk of unintentional death directly related to this medication therapy. I know that if my provider feels my risk from controlled medications is greater than my benefit, I will have my controlled substance medication(s) compassionately lowered or removed altogether. I agree to a controlled substance medication trial.      I further agree to allow this office to contact my HIPAA contact on file if there are concerns about my safety and use of controlled medications. I have agreed to use the following medications above as instructed by my physician and as stated in this [address] [address].      Patient Signature:  ______________________  Date:4/17/2019 or _____________    Provider Signature:______________________  Date:4/17/2019 or _____________

## 2019-04-17 NOTE — PROGRESS NOTES
Cristofer Marker  1990  33 y.o.  female    SUBJECTIVE:    Chief Complaint   Patient presents with    New Patient     prev Liliane Coxlier; frustrated with his prior treatment/attitude    Mouth Lesions     has spoken with dr Franklin Lambert about previously; it has been there approx. 1 year; solid does not move, has like a little tail in her left cheek    Other     struggles with weight; has tried various treatments with no success/exercises; struggling x1 year       HPI   Mouth lesions-pt states she has had lump x one year, inner lower left cheek. Not improving, pt does feel it is getting bigger and denies previous injury. Previous PCP referred her to someone in [facility] but pt was unable to get there due to work schedule. Wt gain-pt has had issues with losing weight over last several years, states she has been on multiple diets-karlos pamela/wt watchers/meatcheesegg diet/ has tried working out and then watching carbs/Thrive/fitness pal/weighing and measurine foods/dietician consult. Significant other lost 60#s doing same things but pt continues to struggle. Is very frustrated and does not know why she is having so much difficutly. Pre-diabetes-diagnosed with pre-diabetes by previous provider    PHQ-9 Total Score: 0 (2019 10:46 AM)      Allergies   Allergen Reactions    Wool Alcohol [Lanolin] Hives     \"wool\" ; hives/ blisters       Past Medical History:   Diagnosis Date    Depression     Diabetes mellitus ([facility] [address]     type 2-diet controlled.  Group B streptococcal infection during pregnancy      (normal spontaneous vaginal delivery) 7-2-15    delivered in ambulance 12 minutes    Obesity, unspecified     Pancreatitis chronic     Has been on medication for diabetes, but changed to diet a few months ago.     Problems with hearing     dr Franklin Lambert; only due to when she had a cold       Past Surgical History:   Procedure Laterality Date    HEMORRHOID SURGERY      x3    TUBAL LIGATION  2015    VAGINA Negative for cold intolerance, heat intolerance, polydipsia, polyphagia and polyuria. Genitourinary: Negative. Musculoskeletal: Negative for arthralgias and myalgias. Skin: Negative for rash. Allergic/Immunologic: Negative for immunocompromised state. Neurological: Negative for dizziness and headaches. Hematological: Negative for adenopathy. Does not bruise/bleed easily. Psychiatric/Behavioral: Negative for dysphoric mood. The patient is not nervous/anxious. OBJECTIVE:    /72 (Site: Left Upper Arm, Position: Sitting, Cuff Size: Large Adult)   Pulse 85   Resp 14   Ht 5' 7\" (1.702 m)   Wt 237 lb 9.6 oz (107.8 kg)   LMP 03/21/2019   Breastfeeding? No   BMI 37.21 kg/m²     Physical Exam   Constitutional: She is oriented to person, place, and time. She appears well-developed and well-nourished. She is cooperative. No distress. HENT:   Head: Normocephalic. Right Ear: Tympanic membrane, external ear and ear canal normal.   Left Ear: Tympanic membrane, external ear and ear canal normal.   Nose: Nose normal.   Mouth/Throat: Uvula is midline, oropharynx is clear and moist and mucous membranes are normal.   Firm nodular lesion noted left buccal mucosa   Eyes: Conjunctivae are normal.   Neck: Trachea normal and normal range of motion. Neck supple. No thyromegaly present. Cardiovascular: Normal rate, regular rhythm and normal heart sounds. Pulmonary/Chest: Effort normal and breath sounds normal.   Abdominal: Soft. Normal appearance and bowel sounds are normal. There is no hepatosplenomegaly. Musculoskeletal: Normal range of motion. Neurological: She is alert and oriented to person, place, and time. She has normal strength. Skin: Skin is warm, dry and intact. Psychiatric: She has a normal mood and affect.  Her speech is normal and behavior is normal. Judgment and thought content normal. Cognition and memory are normal.       ASSESSMENT/PLAN:    Problem List        Digestive Lesion of buccal mucosa - Primary     Will refer to oral sugeon         Relevant Orders    External Referral To Oral Maxillofacial Surgery       Other    Pre-diabetes     A1C 5.7 today, no further need for meds at this time  The patient is asked to make an attempt to improve diet and exercise patterns to aid in medical management of this problem. Relevant Orders    POCT glycosylated hemoglobin (Hb A1C) (Completed)    Tobacco use     Smoking cessation recommended         Obesity     Reviewed increasing exercise, nutrition referral , referral to weight loss clinic in [facility], multiple medication tx therapies. Will start qsymia-Risks/benefits/SE reviewed, pt voices understanding  Recheck in 2 weeks         Relevant Medications    Phentermine-Topiramate (QSYMIA) 3.75-23 MG CP24               Return in about 2 weeks (around 5/1/2019).

## 2019-04-18 LAB — TSH REFLEX FT4: 2.74 UIU/ML (ref 0.27–4.2)

## 2019-04-18 ASSESSMENT — ENCOUNTER SYMPTOMS
ABDOMINAL PAIN: 0
VOMITING: 0
DIARRHEA: 0
CHEST TIGHTNESS: 0
COUGH: 0
TROUBLE SWALLOWING: 0
SHORTNESS OF BREATH: 0
NAUSEA: 0

## 2019-04-18 NOTE — TELEPHONE ENCOUNTER
We discussed other options yesterday during visit, I do not prescribe adipex so unfortunately any other drug may be the same cost. I can refer her to the weight loss clinic in [address] [place] [address] , as we discussed yesterday, if she'd like.

## 2019-04-18 NOTE — ASSESSMENT & PLAN NOTE
Reviewed increasing exercise, nutrition referral , referral to weight loss clinic in [facility], multiple medication tx therapies.  Will start qsymia-Risks/benefits/SE reviewed, pt voices understanding  Recheck in 2 weeks

## 2019-04-18 NOTE — TELEPHONE ENCOUNTER
Pt said she was able to get on their website and download the free trial of qsymia; so she will try that

## 2019-05-01 ENCOUNTER — TELEPHONE (OUTPATIENT)
Dept: FAMILY MEDICINE CLINIC | Age: 29
End: 2019-05-01

## 2019-05-01 ENCOUNTER — OFFICE VISIT (OUTPATIENT)
Dept: FAMILY MEDICINE CLINIC | Age: 29
End: 2019-05-01
Payer: COMMERCIAL

## 2019-05-01 VITALS
WEIGHT: 238 LBS | BODY MASS INDEX: 37.35 KG/M2 | DIASTOLIC BLOOD PRESSURE: 80 MMHG | SYSTOLIC BLOOD PRESSURE: 116 MMHG | HEIGHT: 67 IN | RESPIRATION RATE: 20 BRPM | HEART RATE: 93 BPM

## 2019-05-01 DIAGNOSIS — E66.9 CLASS 2 OBESITY WITHOUT SERIOUS COMORBIDITY WITH BODY MASS INDEX (BMI) OF 37.0 TO 37.9 IN ADULT, UNSPECIFIED OBESITY TYPE: Primary | ICD-10-CM

## 2019-05-01 DIAGNOSIS — E66.9 CLASS 2 OBESITY WITHOUT SERIOUS COMORBIDITY WITH BODY MASS INDEX (BMI) OF 37.0 TO 37.9 IN ADULT, UNSPECIFIED OBESITY TYPE: ICD-10-CM

## 2019-05-01 PROCEDURE — 99213 OFFICE O/P EST LOW 20 MIN: CPT | Performed by: PHYSICIAN ASSISTANT

## 2019-05-01 PROCEDURE — G8427 DOCREV CUR MEDS BY ELIG CLIN: HCPCS | Performed by: PHYSICIAN ASSISTANT

## 2019-05-01 PROCEDURE — 4004F PT TOBACCO SCREEN RCVD TLK: CPT | Performed by: PHYSICIAN ASSISTANT

## 2019-05-01 PROCEDURE — G8417 CALC BMI ABV UP PARAM F/U: HCPCS | Performed by: PHYSICIAN ASSISTANT

## 2019-05-01 ASSESSMENT — PATIENT HEALTH QUESTIONNAIRE - PHQ9
SUM OF ALL RESPONSES TO PHQ9 QUESTIONS 1 & 2: 0
SUM OF ALL RESPONSES TO PHQ QUESTIONS 1-9: 0
1. LITTLE INTEREST OR PLEASURE IN DOING THINGS: 0
2. FEELING DOWN, DEPRESSED OR HOPELESS: 0
SUM OF ALL RESPONSES TO PHQ QUESTIONS 1-9: 0

## 2019-05-01 ASSESSMENT — ENCOUNTER SYMPTOMS
VOMITING: 0
DIARRHEA: 0
COUGH: 0
NAUSEA: 0

## 2019-05-01 NOTE — PROGRESS NOTES
Every Day Smoker     Packs/day: 0.50     Years: 8.00     Pack years: 4.00     Types: Cigarettes    Smokeless tobacco: Never Used    Tobacco comment: smoking one pack per week   Substance and Sexual Activity    Alcohol use: Yes     Alcohol/week: 0.0 oz     Comment: socially    Drug use: Never    Sexual activity: Yes     Partners: Male   Lifestyle    Physical activity:     Days per week: None     Minutes per session: None    Stress: None   Relationships    Social connections:     Talks on phone: None     Gets together: None     Attends Taoism service: None     Active member of club or organization: None     Attends meetings of clubs or organizations: None     Relationship status: None    Intimate partner violence:     Fear of current or ex partner: None     Emotionally abused: None     Physically abused: None     Forced sexual activity: None   Other Topics Concern    None   Social History Narrative    None       Review of Systems   Constitutional: Negative for chills, fever and unexpected weight change. Respiratory: Negative for cough. Cardiovascular: Negative for chest pain. Gastrointestinal: Negative for diarrhea, nausea and vomiting. Skin: Negative for rash. Neurological: Negative for light-headedness and headaches. Hematological: Negative for adenopathy. OBJECTIVE:    /80 (Site: Right Upper Arm, Position: Sitting, Cuff Size: Large Adult)   Pulse 93   Resp 20   Ht 5' 7\" (1.702 m)   Wt 238 lb (108 kg)   LMP 04/24/2019   Breastfeeding? No   BMI 37.28 kg/m²     Physical Exam   Constitutional: She is oriented to person, place, and time. She appears well-developed and well-nourished. No distress. Cardiovascular: Normal rate, regular rhythm and normal heart sounds. Pulmonary/Chest: Effort normal and breath sounds normal.   Neurological: She is oriented to person, place, and time. Skin: Skin is warm and dry. Psychiatric: She has a normal mood and affect.  Her behavior is normal. Judgment and thought content normal.       ASSESSMENT/PLAN:    Problem List        Other    Obesity - Primary     Will try belviq instead, reviewed risks/benefits/SE  Recheck in one month         Relevant Medications    Lorcaserin HCl 10 MG TABS               Return in about 1 month (around 5/29/2019).

## 2020-01-08 ENCOUNTER — OFFICE VISIT (OUTPATIENT)
Dept: FAMILY MEDICINE CLINIC | Age: 30
End: 2020-01-08
Payer: COMMERCIAL

## 2020-01-08 VITALS
WEIGHT: 243 LBS | DIASTOLIC BLOOD PRESSURE: 70 MMHG | HEART RATE: 77 BPM | RESPIRATION RATE: 16 BRPM | BODY MASS INDEX: 38.06 KG/M2 | SYSTOLIC BLOOD PRESSURE: 100 MMHG

## 2020-01-08 PROBLEM — F33.1 MODERATE EPISODE OF RECURRENT MAJOR DEPRESSIVE DISORDER (HCC): Status: ACTIVE | Noted: 2020-01-08

## 2020-01-08 PROBLEM — F41.9 ANXIETY: Status: ACTIVE | Noted: 2020-01-08

## 2020-01-08 PROBLEM — Z13.31 POSITIVE DEPRESSION SCREENING: Status: ACTIVE | Noted: 2020-01-08

## 2020-01-08 LAB — HBA1C MFR BLD: 5.9 %

## 2020-01-08 PROCEDURE — 83036 HEMOGLOBIN GLYCOSYLATED A1C: CPT | Performed by: PHYSICIAN ASSISTANT

## 2020-01-08 PROCEDURE — 99214 OFFICE O/P EST MOD 30 MIN: CPT | Performed by: PHYSICIAN ASSISTANT

## 2020-01-08 PROCEDURE — G8431 POS CLIN DEPRES SCRN F/U DOC: HCPCS | Performed by: PHYSICIAN ASSISTANT

## 2020-01-08 PROCEDURE — G8484 FLU IMMUNIZE NO ADMIN: HCPCS | Performed by: PHYSICIAN ASSISTANT

## 2020-01-08 PROCEDURE — G0444 DEPRESSION SCREEN ANNUAL: HCPCS | Performed by: PHYSICIAN ASSISTANT

## 2020-01-08 PROCEDURE — G8428 CUR MEDS NOT DOCUMENT: HCPCS | Performed by: PHYSICIAN ASSISTANT

## 2020-01-08 PROCEDURE — G8417 CALC BMI ABV UP PARAM F/U: HCPCS | Performed by: PHYSICIAN ASSISTANT

## 2020-01-08 PROCEDURE — 4004F PT TOBACCO SCREEN RCVD TLK: CPT | Performed by: PHYSICIAN ASSISTANT

## 2020-01-08 RX ORDER — SERTRALINE HYDROCHLORIDE 25 MG/1
25 TABLET, FILM COATED ORAL DAILY
Qty: 30 TABLET | Refills: 3 | Status: SHIPPED | OUTPATIENT
Start: 2020-01-08 | End: 2021-03-05

## 2020-01-08 RX ORDER — HYDROXYZINE HYDROCHLORIDE 10 MG/1
10 TABLET, FILM COATED ORAL EVERY 8 HOURS PRN
Qty: 90 TABLET | Refills: 2 | Status: SHIPPED | OUTPATIENT
Start: 2020-01-08 | End: 2020-01-18

## 2020-01-08 ASSESSMENT — ENCOUNTER SYMPTOMS
COUGH: 0
SHORTNESS OF BREATH: 0
DIARRHEA: 0
VOMITING: 0
NAUSEA: 0

## 2020-01-08 ASSESSMENT — PATIENT HEALTH QUESTIONNAIRE - PHQ9
2. FEELING DOWN, DEPRESSED OR HOPELESS: 3
1. LITTLE INTEREST OR PLEASURE IN DOING THINGS: 0
SUM OF ALL RESPONSES TO PHQ QUESTIONS 1-9: 13
3. TROUBLE FALLING OR STAYING ASLEEP: 3
10. IF YOU CHECKED OFF ANY PROBLEMS, HOW DIFFICULT HAVE THESE PROBLEMS MADE IT FOR YOU TO DO YOUR WORK, TAKE CARE OF THINGS AT HOME, OR GET ALONG WITH OTHER PEOPLE: 2
SUM OF ALL RESPONSES TO PHQ9 QUESTIONS 1 & 2: 3
8. MOVING OR SPEAKING SO SLOWLY THAT OTHER PEOPLE COULD HAVE NOTICED. OR THE OPPOSITE, BEING SO FIGETY OR RESTLESS THAT YOU HAVE BEEN MOVING AROUND A LOT MORE THAN USUAL: 0
SUM OF ALL RESPONSES TO PHQ QUESTIONS 1-9: 13
4. FEELING TIRED OR HAVING LITTLE ENERGY: 0
5. POOR APPETITE OR OVEREATING: 1
6. FEELING BAD ABOUT YOURSELF - OR THAT YOU ARE A FAILURE OR HAVE LET YOURSELF OR YOUR FAMILY DOWN: 3
9. THOUGHTS THAT YOU WOULD BE BETTER OFF DEAD, OR OF HURTING YOURSELF: 0
7. TROUBLE CONCENTRATING ON THINGS, SUCH AS READING THE NEWSPAPER OR WATCHING TELEVISION: 3

## 2020-01-08 NOTE — ASSESSMENT & PLAN NOTE
Will start low dose zoloft, Risks/benefits/SE reviewed, pt voices understanding  Recheck in 4-6 weeks,.  Sooner prn

## 2020-01-08 NOTE — PROGRESS NOTES
Nadiya Mary Alice  1990  30 y.o.  female    SUBJECTIVE:    Chief Complaint   Patient presents with    Anxiety     patient is here for issues with anxiety feels overwhelmed feels worried unable to sleep . she stated she has a good life she just feels something is going to go wrong she feels like every day it gets worse she became teary eyed while talking with me. symptoms started about 2 months ago she will get up at 2 am to make herself a to do list        HPI   Anxiety/depression-pt states she feels \"like I did post partum or when I was going through my divorce\". Pt states she doesn't know why she feels anxious/depressed-has a good job and works a lot but it is not stressful, children are healthy and well behaved, has a supportive spouse. States she feels \"like an ungrateful ass\" right now. Constantly worries something is going to go wrong, makes lists of \"to do things\" in middle of night if she wakes up. No current outpatient medications on file prior to visit. No current facility-administered medications on file prior to visit. Review of PMH, PSH, Family Hx, Allergies and updates made as needed. PHQ Scores 1/8/2020 5/1/2019 4/17/2019 11/7/2017 4/18/2016   PHQ2 Score 3 0 0 0 0   PHQ9 Score 13 0 0 0 0     Interpretation of Total Score Depression Severity: 1-4 = Minimal depression, 5-9 = Mild depression, 10-14 = Moderate depression, 15-19 = Moderately severe depression, 20-27 = Severe depression    Prediabetes-has not worked on diet/exercise. Continues to have issues with weight loss/diet control. Was going to start medication for weight loss last year but could not afford it. No current outpatient medications on file prior to visit. No current facility-administered medications on file prior to visit. Review of PMH, PSH, Family Hx, Allergies and updates made as needed.       Allergies   Allergen Reactions    Wool Alcohol [Lanolin] Hives     \"wool\" ; hives/ blisters       Past Medical abused: None     Forced sexual activity: None   Other Topics Concern    None   Social History Narrative    None       Review of Systems   Constitutional: Negative for activity change, appetite change and fever. Eyes: Negative for visual disturbance. Respiratory: Negative for cough and shortness of breath. Cardiovascular: Negative for chest pain. Gastrointestinal: Negative for diarrhea, nausea and vomiting. Endocrine: Negative for cold intolerance, heat intolerance, polydipsia, polyphagia and polyuria. Genitourinary: Negative for frequency. Musculoskeletal: Negative. Skin: Negative for rash. Neurological: Negative for dizziness and headaches. Psychiatric/Behavioral: Positive for dysphoric mood. Negative for self-injury, sleep disturbance and suicidal ideas. The patient is nervous/anxious. OBJECTIVE:    /70 (Site: Right Upper Arm, Position: Sitting, Cuff Size: Large Adult)   Pulse 77   Resp 16   Wt 243 lb (110.2 kg)   LMP 12/04/2019   BMI 38.06 kg/m²     Physical Exam  Constitutional:       Appearance: Normal appearance. She is obese. HENT:      Head: Normocephalic. Right Ear: External ear normal.      Left Ear: External ear normal.      Nose: Nose normal.   Eyes:      Conjunctiva/sclera: Conjunctivae normal.   Neck:      Musculoskeletal: Normal range of motion and neck supple. Cardiovascular:      Rate and Rhythm: Normal rate and regular rhythm. Pulmonary:      Effort: Pulmonary effort is normal.      Breath sounds: Normal breath sounds. Skin:     General: Skin is warm and dry. Neurological:      Mental Status: She is alert and oriented to person, place, and time. Psychiatric:         Mood and Affect: Mood is anxious and depressed. Speech: Speech normal.         Behavior: Behavior normal.         Thought Content:  Thought content normal.         ASSESSMENT/PLAN:    Problem List        Other    Anxiety     Atarax prn, Risks/benefits/SE reviewed, pt

## 2020-01-27 ENCOUNTER — OFFICE VISIT (OUTPATIENT)
Dept: FAMILY MEDICINE CLINIC | Age: 30
End: 2020-01-27
Payer: COMMERCIAL

## 2020-01-27 VITALS
BODY MASS INDEX: 41.51 KG/M2 | DIASTOLIC BLOOD PRESSURE: 74 MMHG | OXYGEN SATURATION: 98 % | HEART RATE: 93 BPM | RESPIRATION RATE: 16 BRPM | SYSTOLIC BLOOD PRESSURE: 118 MMHG | WEIGHT: 249.13 LBS | HEIGHT: 65 IN | TEMPERATURE: 98.1 F

## 2020-01-27 PROBLEM — R52 GENERALIZED BODY ACHES: Status: ACTIVE | Noted: 2020-01-27

## 2020-01-27 PROBLEM — J06.9 VIRAL URI: Status: ACTIVE | Noted: 2020-01-27

## 2020-01-27 LAB
INFLUENZA VIRUS A RNA: NEGATIVE
INFLUENZA VIRUS B RNA: NEGATIVE

## 2020-01-27 PROCEDURE — G8484 FLU IMMUNIZE NO ADMIN: HCPCS | Performed by: NURSE PRACTITIONER

## 2020-01-27 PROCEDURE — G8417 CALC BMI ABV UP PARAM F/U: HCPCS | Performed by: NURSE PRACTITIONER

## 2020-01-27 PROCEDURE — 87502 INFLUENZA DNA AMP PROBE: CPT | Performed by: NURSE PRACTITIONER

## 2020-01-27 PROCEDURE — 4004F PT TOBACCO SCREEN RCVD TLK: CPT | Performed by: NURSE PRACTITIONER

## 2020-01-27 PROCEDURE — 99213 OFFICE O/P EST LOW 20 MIN: CPT | Performed by: NURSE PRACTITIONER

## 2020-01-27 PROCEDURE — G8427 DOCREV CUR MEDS BY ELIG CLIN: HCPCS | Performed by: NURSE PRACTITIONER

## 2020-01-27 ASSESSMENT — ENCOUNTER SYMPTOMS
SORE THROAT: 1
RESPIRATORY NEGATIVE: 1
EYES NEGATIVE: 1
TROUBLE SWALLOWING: 0
SINUS PRESSURE: 1
SINUS PAIN: 1
NAUSEA: 0
RHINORRHEA: 1
DIARRHEA: 1
VOMITING: 0
ABDOMINAL PAIN: 0

## 2020-01-27 ASSESSMENT — PATIENT HEALTH QUESTIONNAIRE - PHQ9
SUM OF ALL RESPONSES TO PHQ9 QUESTIONS 1 & 2: 2
SUM OF ALL RESPONSES TO PHQ QUESTIONS 1-9: 2
SUM OF ALL RESPONSES TO PHQ QUESTIONS 1-9: 2
1. LITTLE INTEREST OR PLEASURE IN DOING THINGS: 1
2. FEELING DOWN, DEPRESSED OR HOPELESS: 1

## 2020-01-27 NOTE — PROGRESS NOTES
Subjective:      Chief Complaint   Patient presents with    Congestion     Pt is here for congestion//chills//facial pain and pressure for the past 3 days    Fever    Sweats    Diarrhea    Headache       HPI:  Raf Loera is a 30 y.o. female who presents today for fatigue, body aches,  chills, nasal congestion, rhinorrhea, sinus pain/pressure, and sore throat. Symptoms started on Saturday. She has had diarrhea since yesterday. Unknown if she had had any fevers. She denies chest pain, shortness of breath or wheezing. She has been using the coco pot, steamy bathroom, sudafed, tylenol, ibuprofen with no relief. She has not had the flu shot this season. Past Medical History:   Diagnosis Date    Depression     Diabetes mellitus ([facility] [address]     type 2-diet controlled.  Group B streptococcal infection during pregnancy      (normal spontaneous vaginal delivery) 7-2-15    delivered in ambulance 12 minutes    Obesity, unspecified     Pancreatitis chronic     Has been on medication for diabetes, but changed to diet a few months ago.  Problems with hearing     dr Solis Gonzalez; only due to when she had a cold        Social History     Tobacco Use    Smoking status: Current Every Day Smoker     Packs/day: 0.50     Years: 8.00     Pack years: 4.00     Types: Cigarettes    Smokeless tobacco: Never Used    Tobacco comment:  smoking less than a pack a week she stated she should stop   Substance Use Topics    Alcohol use: Yes     Alcohol/week: 0.0 standard drinks     Comment: socially        Review of Systems   Constitutional: Positive for chills and fatigue. Negative for activity change and appetite change. HENT: Positive for congestion, rhinorrhea, sinus pressure, sinus pain and sore throat. Negative for ear pain and trouble swallowing. Eyes: Negative. Respiratory: Negative. Cardiovascular: Negative. Gastrointestinal: Positive for diarrhea. Negative for abdominal pain, nausea and vomiting.

## 2020-01-28 ENCOUNTER — TELEPHONE (OUTPATIENT)
Dept: FAMILY MEDICINE CLINIC | Age: 30
End: 2020-01-28

## 2020-01-28 NOTE — TELEPHONE ENCOUNTER
Called and spoke with patient advised patient to contact [address] [place] [address] office per Elisha Wilson advised patient she could f/u up at [facility] office for evaluation patient stated she paid $60.00 dollars in co-pays she is contacting the [address] [place] [address]

## 2020-01-28 NOTE — TELEPHONE ENCOUNTER
As I did not see her, I would advise her to call the [facility] office for further treatment regarding the sick visit or f/u here for evaluation

## 2020-08-05 ENCOUNTER — VIRTUAL VISIT (OUTPATIENT)
Dept: FAMILY MEDICINE CLINIC | Age: 30
End: 2020-08-05

## 2020-08-05 PROCEDURE — 99213 OFFICE O/P EST LOW 20 MIN: CPT | Performed by: NURSE PRACTITIONER

## 2020-08-05 PROCEDURE — G8428 CUR MEDS NOT DOCUMENT: HCPCS | Performed by: NURSE PRACTITIONER

## 2020-08-05 RX ORDER — AZITHROMYCIN 250 MG/1
250 TABLET, FILM COATED ORAL SEE ADMIN INSTRUCTIONS
Qty: 6 TABLET | Refills: 0 | Status: SHIPPED | OUTPATIENT
Start: 2020-08-05 | End: 2020-08-10

## 2020-08-05 RX ORDER — GUAIFENESIN 600 MG/1
600 TABLET, EXTENDED RELEASE ORAL 2 TIMES DAILY
Qty: 30 TABLET | Refills: 0 | Status: SHIPPED | OUTPATIENT
Start: 2020-08-05 | End: 2020-08-20

## 2020-08-05 RX ORDER — BENZONATATE 100 MG/1
100 CAPSULE ORAL 3 TIMES DAILY PRN
Qty: 30 CAPSULE | Refills: 0 | Status: SHIPPED | OUTPATIENT
Start: 2020-08-05 | End: 2020-08-12

## 2020-08-05 NOTE — LETTER
[facility] & CHARY Mccann [address] [place] [address]  Phone: [phone]  Fax: [phone]    CARLEE Joseph CNP        August 5, 2020     Patient: Lizzy Portillo   YOB: 1990   Date of Visit: 8/5/2020       To Whom It May Concern: It is my medical opinion that Mathis Dakins may return to work on Friday August 7, 2020 with no restrictions. .    If you have any questions or concerns, please don't hesitate to call.     Sincerely,          CARLEE Joseph CNP

## 2020-08-05 NOTE — PROGRESS NOTES
2020    TELEHEALTH EVALUATION -- Audio/Visual (During HIQVQ-37 public health emergency)    HPI:  Sick for three weeks with fever cough and had covid test negative. 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 No fever x 3 days. Still has cough. White mucous with cough- hard clumps. Concerned she has bronchitis. No other symptoms right now. Feels short of breath at times. 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 She has a negative test result. Jonathon Crouch (:  1990) has requested an audio/video evaluation for the following concern(s):    Cough, productive cough. Review of Systems    Prior to Visit Medications    Medication Sig Taking? Authorizing Provider   benzonatate (TESSALON PERLES) 100 MG capsule Take 1 capsule by mouth 3 times daily as needed for Cough Yes Dread Murrieta APRN - CNP   guaiFENesin (MUCINEX) 600 MG extended release tablet Take 1 tablet by mouth 2 times daily for 15 days Yes Dread Murrieta APRN - CNP   azithromycin (ZITHROMAX) 250 MG tablet Take 1 tablet by mouth See Admin Instructions for 5 days 500mg on day 1 followed by 250mg on days 2 - 5 Yes Dread Murrieta APRN - CNP   sertraline (ZOLOFT) 25 MG tablet Take 1 tablet by mouth daily  Tyron Arrieta PA-C       Social History     Tobacco Use    Smoking status: Current Every Day Smoker     Packs/day: 0.50     Years: 8.00     Pack years: 4.00     Types: Cigarettes    Smokeless tobacco: Never Used    Tobacco comment:  smoking less than a pack a week she stated she should stop   Substance Use Topics    Alcohol use:  Yes     Alcohol/week: 0.0 standard drinks     Comment: socially    Drug use: Never            PHYSICAL EXAMINATION:  [ INSTRUCTIONS:  \"[x]\" Indicates a positive item  \"[]\" Indicates a negative item  -- DELETE ALL ITEMS NOT EXAMINED]  Vital Signs: (As obtained by patient/caregiver or practitioner observation)    Blood pressure-  Heart rate- 80   Respiratory rate-  Observed 16-18  Temperature- no fever x 3 days Pulse oximetry-     Constitutional: [x] Appears well-developed and well-nourished [x] No apparent distress      [] Abnormal-   Mental status  [x] Alert and awake  [x] Oriented to person/place/time []Able to follow commands      Eyes:  EOM    [x]  Normal  [] Abnormal-  Sclera  [x]  Normal  [] Abnormal -         Discharge [x]  None visible  [] Abnormal -    HENT:   [x] Normocephalic, atraumatic. [] Abnormal   [x] Mouth/Throat: Mucous membranes are moist.     External Ears [x] Normal  [] Abnormal-     Neck: [x] No visualized mass     Pulmonary/Chest: [x] Respiratory effort normal.  [] No visualized signs of difficulty breathing or respiratory distress        [] Abnormal-      Musculoskeletal:   [] Normal gait with no signs of ataxia         [x] Normal range of motion of neck        [] Abnormal-       Neurological:        [x] No Facial Asymmetry (Cranial nerve 7 motor function) (limited exam to video visit)          [x] No gaze palsy        [] Abnormal-         Skin:        [x] No significant exanthematous lesions or discoloration noted on facial skin         [] Abnormal-            Psychiatric:       [x] Normal Affect [] No Hallucinations        [] Abnormal-     Other pertinent observable physical exam findings-     ASSESSMENT/PLAN:  1. Bronchitismeds as listed. Can return to work Friday. Note written and emailed to [email or web address]  - benzonatate (TESSALON PERLES) 100 MG capsule; Take 1 capsule by mouth 3 times daily as needed for Cough  Dispense: 30 capsule; Refill: 0  - guaiFENesin (MUCINEX) 600 MG extended release tablet; Take 1 tablet by mouth 2 times daily for 15 days  Dispense: 30 tablet; Refill: 0  - azithromycin (ZITHROMAX) 250 MG tablet; Take 1 tablet by mouth See Admin Instructions for 5 days 500mg on day 1 followed by 250mg on days 2 - 5  Dispense: 6 tablet;  Refill: 0      Return if symptoms worsen or fail to improve, for With primary care

## 2020-11-16 ENCOUNTER — HOSPITAL ENCOUNTER (EMERGENCY)
Age: 30
Discharge: HOME | End: 2020-11-16
Attending: EMERGENCY MEDICINE
Payer: COMMERCIAL

## 2020-11-16 ENCOUNTER — APPOINTMENT (OUTPATIENT)
Dept: CT IMAGING | Age: 30
End: 2020-11-16
Payer: COMMERCIAL

## 2020-11-16 VITALS
TEMPERATURE: 98.6 F | RESPIRATION RATE: 18 BRPM | HEART RATE: 88 BPM | DIASTOLIC BLOOD PRESSURE: 110 MMHG | SYSTOLIC BLOOD PRESSURE: 173 MMHG | OXYGEN SATURATION: 100 %

## 2020-11-16 LAB
ALBUMIN SERPL-MCNC: 4.4 GM/DL (ref 3.4–5)
ALP BLD-CCNC: 99 IU/L (ref 40–129)
ALT SERPL-CCNC: 27 U/L (ref 10–40)
ANION GAP SERPL CALCULATED.3IONS-SCNC: 13 MMOL/L (ref 4–16)
AST SERPL-CCNC: 23 IU/L (ref 15–37)
BACTERIA: NEGATIVE /HPF
BASOPHILS ABSOLUTE: 0 K/CU MM
BASOPHILS RELATIVE PERCENT: 0.3 % (ref 0–1)
BILIRUB SERPL-MCNC: 0.2 MG/DL (ref 0–1)
BILIRUBIN URINE: NEGATIVE MG/DL
BLOOD, URINE: NEGATIVE
BUN BLDV-MCNC: 10 MG/DL (ref 6–23)
CALCIUM SERPL-MCNC: 9.9 MG/DL (ref 8.3–10.6)
CAST TYPE: ABNORMAL /HPF
CHLORIDE BLD-SCNC: 100 MMOL/L (ref 99–110)
CLARITY: CLEAR
CO2: 26 MMOL/L (ref 21–32)
COLOR: YELLOW
CREAT SERPL-MCNC: 0.8 MG/DL (ref 0.6–1.1)
CRYSTAL TYPE: NEGATIVE /HPF
DIFFERENTIAL TYPE: ABNORMAL
EOSINOPHILS ABSOLUTE: 0.2 K/CU MM
EOSINOPHILS RELATIVE PERCENT: 1.3 % (ref 0–3)
EPITHELIAL CELLS, UA: 1 /HPF
GFR AFRICAN AMERICAN: >60 ML/MIN/1.73M2
GFR NON-AFRICAN AMERICAN: >60 ML/MIN/1.73M2
GLUCOSE BLD-MCNC: 105 MG/DL (ref 70–99)
GLUCOSE, URINE: NEGATIVE MG/DL
GONADOTROPIN, CHORIONIC (HCG) QUANT: <0.5 UIU/ML
HCT VFR BLD CALC: 42.5 % (ref 37–47)
HEMOGLOBIN: 13.6 GM/DL (ref 12.5–16)
IMMATURE NEUTROPHIL %: 0.5 % (ref 0–0.43)
KETONES, URINE: NEGATIVE MG/DL
LEUKOCYTE ESTERASE, URINE: NEGATIVE
LYMPHOCYTES ABSOLUTE: 4.2 K/CU MM
LYMPHOCYTES RELATIVE PERCENT: 36.2 % (ref 24–44)
MCH RBC QN AUTO: 28.8 PG (ref 27–31)
MCHC RBC AUTO-ENTMCNC: 32 % (ref 32–36)
MCV RBC AUTO: 90 FL (ref 78–100)
MONOCYTES ABSOLUTE: 0.7 K/CU MM
MONOCYTES RELATIVE PERCENT: 5.6 % (ref 0–4)
NITRITE URINE, QUANTITATIVE: NEGATIVE
PDW BLD-RTO: 13.7 % (ref 11.7–14.9)
PH, URINE: 7 (ref 5–8)
PLATELET # BLD: 298 K/CU MM (ref 140–440)
PMV BLD AUTO: 9.7 FL (ref 7.5–11.1)
POTASSIUM SERPL-SCNC: 4 MMOL/L (ref 3.5–5.1)
PROTEIN UA: ABNORMAL MG/DL
RBC # BLD: 4.72 M/CU MM (ref 4.2–5.4)
RBC URINE: ABNORMAL /HPF (ref 0–6)
SEGMENTED NEUTROPHILS ABSOLUTE COUNT: 6.6 K/CU MM
SEGMENTED NEUTROPHILS RELATIVE PERCENT: 56.1 % (ref 36–66)
SODIUM BLD-SCNC: 139 MMOL/L (ref 135–145)
SPECIFIC GRAVITY UA: 1.01 (ref 1–1.03)
TOTAL IMMATURE NEUTOROPHIL: 0.06 K/CU MM
TOTAL PROTEIN: 7.5 GM/DL (ref 6.4–8.2)
UROBILINOGEN, URINE: 0.2 MG/DL (ref 0.2–1)
WBC # BLD: 11.7 K/CU MM (ref 4–10.5)
WBC UA: 1 /HPF (ref 0–5)

## 2020-11-16 PROCEDURE — 96375 TX/PRO/DX INJ NEW DRUG ADDON: CPT

## 2020-11-16 PROCEDURE — 6370000000 HC RX 637 (ALT 250 FOR IP): Performed by: EMERGENCY MEDICINE

## 2020-11-16 PROCEDURE — 81001 URINALYSIS AUTO W/SCOPE: CPT

## 2020-11-16 PROCEDURE — 74177 CT ABD & PELVIS W/CONTRAST: CPT

## 2020-11-16 PROCEDURE — 84702 CHORIONIC GONADOTROPIN TEST: CPT

## 2020-11-16 PROCEDURE — 80053 COMPREHEN METABOLIC PANEL: CPT

## 2020-11-16 PROCEDURE — 6360000002 HC RX W HCPCS: Performed by: EMERGENCY MEDICINE

## 2020-11-16 PROCEDURE — 85025 COMPLETE CBC W/AUTO DIFF WBC: CPT

## 2020-11-16 PROCEDURE — 96374 THER/PROPH/DIAG INJ IV PUSH: CPT

## 2020-11-16 PROCEDURE — 99284 EMERGENCY DEPT VISIT MOD MDM: CPT

## 2020-11-16 PROCEDURE — 6360000004 HC RX CONTRAST MEDICATION: Performed by: EMERGENCY MEDICINE

## 2020-11-16 RX ORDER — ONDANSETRON 2 MG/ML
8 INJECTION INTRAMUSCULAR; INTRAVENOUS ONCE
Status: COMPLETED | OUTPATIENT
Start: 2020-11-16 | End: 2020-11-16

## 2020-11-16 RX ORDER — KETOROLAC TROMETHAMINE 30 MG/ML
30 INJECTION, SOLUTION INTRAMUSCULAR; INTRAVENOUS ONCE
Status: COMPLETED | OUTPATIENT
Start: 2020-11-16 | End: 2020-11-16

## 2020-11-16 RX ORDER — DICYCLOMINE HCL 20 MG
20 TABLET ORAL ONCE
Status: COMPLETED | OUTPATIENT
Start: 2020-11-16 | End: 2020-11-16

## 2020-11-16 RX ORDER — DICYCLOMINE HYDROCHLORIDE 10 MG/1
10 CAPSULE ORAL 3 TIMES DAILY
Qty: 15 CAPSULE | Refills: 3 | Status: SHIPPED | OUTPATIENT
Start: 2020-11-16 | End: 2021-03-05

## 2020-11-16 RX ORDER — ONDANSETRON 4 MG/1
4 TABLET, ORALLY DISINTEGRATING ORAL EVERY 8 HOURS PRN
Qty: 15 TABLET | Refills: 0 | Status: SHIPPED | OUTPATIENT
Start: 2020-11-16 | End: 2020-12-08 | Stop reason: SDUPTHER

## 2020-11-16 RX ADMIN — ONDANSETRON 4 MG: 2 INJECTION INTRAMUSCULAR; INTRAVENOUS at 20:07

## 2020-11-16 RX ADMIN — DICYCLOMINE HYDROCHLORIDE 20 MG: 20 TABLET ORAL at 20:10

## 2020-11-16 RX ADMIN — IOPAMIDOL 100 ML: 755 INJECTION, SOLUTION INTRAVENOUS at 20:57

## 2020-11-16 RX ADMIN — KETOROLAC TROMETHAMINE 30 MG: 30 INJECTION, SOLUTION INTRAMUSCULAR; INTRAVENOUS at 20:09

## 2020-11-16 ASSESSMENT — PAIN DESCRIPTION - PAIN TYPE: TYPE: ACUTE PAIN

## 2020-11-16 ASSESSMENT — PAIN SCALES - GENERAL
PAINLEVEL_OUTOF10: 5
PAINLEVEL_OUTOF10: 5

## 2020-11-16 ASSESSMENT — PAIN DESCRIPTION - DESCRIPTORS: DESCRIPTORS: CONSTANT

## 2020-11-16 ASSESSMENT — PAIN DESCRIPTION - LOCATION: LOCATION: ABDOMEN

## 2020-11-17 ENCOUNTER — TELEPHONE (OUTPATIENT)
Dept: FAMILY MEDICINE CLINIC | Age: 30
End: 2020-11-17

## 2020-11-17 NOTE — TELEPHONE ENCOUNTER
Patient called yesterday and lm on MA vm-she c/o of abdominal pain and her stools and urine being mucousy-it's hard to have a BM or void at this time-I called patient back this AM and spoke with her and she went to the ER because the pain was severe-she has a large cyst like mass on her ovaries and it's the siz of a egg-it's making her have issues with voiding and BM's-she was instructed to f/u with her OB/GYN and she scheduled a appt for Friday-that's the sooniest appt-she was given pain medications in the ER-instructed the patient to call with any concerns

## 2020-11-17 NOTE — ED PROVIDER NOTES
Triage Chief Complaint:   Abdominal Pain (since tuesday RLQ )    [place]:  Chanel Ibrahim is a 35 y.o. female that presents with lower abdominal and right lower quadrant pain. States that over the last few days she has had worsening cramping/dull pain to that location that has been more constant and severe today, localizing to the right lower quadrant. She states that she has nausea, yellowish/mucus diarrhea that started today. Denies any vomiting but has had poor p.o. intake. States pain is worse after eating and after urinating. Denies any fevers, sick contacts. She has not had cough, shortness of breath or chest pain. Has not taken anything for the pain. ROS:  At least 10 systems reviewed and otherwise acutely negative except as in the [address] Past Medical History:   Diagnosis Date    Depression     Diabetes mellitus ([facility] [address]     type 2-diet controlled.  Group B streptococcal infection during pregnancy      (normal spontaneous vaginal delivery) 7-2-15    delivered in ambulance 12 minutes    Obesity, unspecified     Pancreatitis chronic     Has been on medication for diabetes, but changed to diet a few months ago.     Problems with hearing     dr Romeo Kim; only due to when she had a cold     Past Surgical History:   Procedure Laterality Date    HEMORRHOID SURGERY      x3    TUBAL LIGATION  2015    VAGINA SURGERY      reconstruction between vag/anal area from childbirth     Family History   Adopted: Yes   Problem Relation Age of Onset    Breast Cancer Mother     Ovarian Cancer Mother     No Known Problems Father     Other Sister         autistic (1 sister)    Cancer Brother         eye, testicular     Social History     Socioeconomic History    Marital status:      Spouse name: Not on file    Number of children: Not on file    Years of education: Not on file    Highest education level: Not on file   Occupational History    Not on file   Social Needs    Financial resource strain: Not on file    Food insecurity     Worry: Not on file     Inability: Not on file    Transportation needs     Medical: Not on file     Non-medical: Not on file   Tobacco Use    Smoking status: Current Every Day Smoker     Packs/day: 0.50     Years: 8.00     Pack years: 4.00     Types: Cigarettes    Smokeless tobacco: Never Used    Tobacco comment:  smoking less than a pack a week she stated she should stop   Substance and Sexual Activity    Alcohol use: Yes     Alcohol/week: 0.0 standard drinks     Comment: socially    Drug use: Never    Sexual activity: Yes     Partners: Male   Lifestyle    Physical activity     Days per week: Not on file     Minutes per session: Not on file    Stress: Not on file   Relationships    Social connections     Talks on phone: Not on file     Gets together: Not on file     Attends Spiritism service: Not on file     Active member of club or organization: Not on file     Attends meetings of clubs or organizations: Not on file     Relationship status: Not on file    Intimate partner violence     Fear of current or ex partner: Not on file     Emotionally abused: Not on file     Physically abused: Not on file     Forced sexual activity: Not on file   Other Topics Concern    Not on file   Social History Narrative    Not on file     No current facility-administered medications for this encounter.       Current Outpatient Medications   Medication Sig Dispense Refill    ondansetron (ZOFRAN ODT) 4 MG disintegrating tablet Take 1 tablet by mouth every 8 hours as needed for Nausea 15 tablet 0    dicyclomine (BENTYL) 10 MG capsule Take 1 capsule by mouth 3 times daily As needed for abdominal pain 15 capsule 3    sertraline (ZOLOFT) 25 MG tablet Take 1 tablet by mouth daily 30 tablet 3     Allergies   Allergen Reactions    Wool Alcohol [Lanolin] Hives     \"wool\" ; hives/ blisters       Nursing Notes Reviewed    Physical Exam:  ED Triage Vitals [11/16/20 1932]   [facility] Vitals [facility] BP (!) 173/110      Pulse 88      Resp 18      Temp 98.6 °F (37 °C)      Temp Source Oral      SpO2 100 %      Weight       Height       Head Circumference       Peak Flow       Pain Score       Pain Loc       Pain Edu? Excl. in [address]? GENERAL APPEARANCE: Awake and alert. Cooperative. No acute distress. HEAD: Normocephalic. Atraumatic. EYES: EOM's grossly intact. Sclera anicteric. ENT: Mucous membranes are moist. Tolerates saliva. No trismus. NECK: No meningismus. HEART:  Extremities pink  LUNGS: Respirations unlabored. Even chest rise bilaterally  ABDOMEN: Non distended. Mildly tender to palpation in suprapubic region. No rebound or guarding  EXTREMITIES: No acute deformities. SKIN: Dry  NEUROLOGICAL: No gross facial drooping. Moves all 4 extremities spontaneously. PSYCHIATRIC: Normal mood.     I have reviewed and interpreted all of the currently available lab results from this visit (if applicable):  Results for orders placed or performed during the hospital encounter of 11/16/20   HCG Serum, Quantitative   Result Value Ref Range    hCG Quant <0.5 UIU/ML   CBC Auto Differential   Result Value Ref Range    WBC 11.7 (H) 4.0 - 10.5 K/CU MM    RBC 4.72 4.2 - 5.4 M/CU MM    Hemoglobin 13.6 12.5 - 16.0 GM/DL    Hematocrit 42.5 37 - 47 %    MCV 90.0 78 - 100 FL    MCH 28.8 27 - 31 PG    MCHC 32.0 32.0 - 36.0 %    RDW 13.7 11.7 - 14.9 %    Platelets 651 102 - 495 K/CU MM    MPV 9.7 7.5 - 11.1 FL    Differential Type AUTOMATED DIFFERENTIAL     Segs Relative 56.1 36 - 66 %    Lymphocytes % 36.2 24 - 44 %    Monocytes % 5.6 (H) 0 - 4 %    Eosinophils % 1.3 0 - 3 %    Basophils % 0.3 0 - 1 %    Segs Absolute 6.6 K/CU MM    Lymphocytes Absolute 4.2 K/CU MM    Monocytes Absolute 0.7 K/CU MM    Eosinophils Absolute 0.2 K/CU MM    Basophils Absolute 0.0 K/CU MM    Immature Neutrophil % 0.5 (H) 0 - 0.43 %    Total Immature Neutrophil 0.06 K/CU MM   Comprehensive Metabolic Panel w/ Reflex to MG   Result Value Ref Range    Sodium 139 135 - 145 MMOL/L    Potassium 4.0 3.5 - 5.1 MMOL/L    Chloride 100 99 - 110 mMol/L    CO2 26 21 - 32 MMOL/L    BUN 10 6 - 23 MG/DL    CREATININE 0.8 0.6 - 1.1 MG/DL    Glucose 105 (H) 70 - 99 MG/DL    Calcium 9.9 8.3 - 10.6 MG/DL    Alb 4.4 3.4 - 5.0 GM/DL    Total Protein 7.5 6.4 - 8.2 GM/DL    Total Bilirubin 0.2 0.0 - 1.0 MG/DL    ALT 27 10 - 40 U/L    AST 23 15 - 37 IU/L    Alkaline Phosphatase 99 40 - 129 IU/L    GFR Non-African American >60 >60 mL/min/1.73m2    GFR African American >60 >60 mL/min/1.73m2    Anion Gap 13 4 - 16   Urinalysis with Microscopic   Result Value Ref Range    Color, UA YELLOW YELLOW    Clarity, UA CLEAR CLEAR    Glucose, Urine NEGATIVE NEGATIVE MG/DL    Bilirubin Urine NEGATIVE NEGATIVE MG/DL    Ketones, Urine NEGATIVE NEGATIVE MG/DL    Specific Gravity, UA 1.015 1.001 - 1.035    Blood, Urine NEGATIVE NEGATIVE    pH, Urine 7.0 5.0 - 8.0    Protein, UA TRACE (A) NEGATIVE MG/DL    Urobilinogen, Urine 0.2 0.2 - 1.0 MG/DL    Nitrite Urine, Quantitative NEGATIVE NEGATIVE    Leukocyte Esterase, Urine NEGATIVE NEGATIVE    RBC, UA NO CELLS SEEN 0 - 6 /HPF    WBC, UA 1 0 - 5 /HPF    Epithelial Cells, UA 1 /HPF    Cast Type NO CAST FORMS SEEN NO CAST FORMS SEEN /HPF    Bacteria, UA NEGATIVE NEGATIVE /HPF    Crystal Type NEGATIVE NEGATIVE /HPF      Radiographs (if obtained):  [] The following radiograph was interpreted by myself in the absence of a radiologist:  [x] Radiologist's Report Reviewed:    EKG (if obtained): (All EKG's are interpreted by myself in the absence of a cardiologist)    MDM:  Plan of care is discussed thoroughly with the patient and family if present. If performed, all imaging and lab work also discussed with patient. All relevant prior results and chart reviewed if available. Patient presents as above. She is in no acute distress. She is hypertensive on arrival which may be secondary to pain.   Vital signs otherwise normal.  She presents with right lower quadrant pain, nausea, diarrhea and some tenderness to palpation of the suprapubic region. Patient given Zofran, Toradol, Bentyl and CT scan ordered for further evaluation. Patient resting comfortably on reevaluation. Metabolic work-up is unremarkable. She does not have a significant leukocytosis. CT with no acute findings. She does have a left adnexal cyst.  Patient does not have any tenderness in this area and states that her pain is on the right. I do not feel that this is a contributing factor to her pain but will require outpatient follow-up with ultrasound. She is understanding of this. Urinalysis unremarkable. Overall, suspect enteritis/colitis as a cause of the patient's symptoms. She is prescribed Zofran and Bentyl, encouraged to return immediately if she has any worsening pain or other symptoms. Discharged in good condition. Clinical Impression:  1. Lower abdominal pain    2. Diarrhea, unspecified type    3.  Adnexal cyst      (Please note that portions of this note may have been completed with a voice recognition program. Efforts were made to edit the dictations but occasionally words are mis-transcribed.)    MD Ginger Hargrove MD  11/16/20 [id number]

## 2020-11-17 NOTE — TELEPHONE ENCOUNTER
Noted, ER if worse. However, CT was unremarkable other than the cyst, it is on her left ovary and ER doc did not feel this was cause of pain.  F/u office if not imporving

## 2020-11-17 NOTE — ED NOTES
Discharge instructions reviewed with patient. Reviewed prescriptions with patient. No additional questions asked. Voiced understanding. Encouraged patient to follow up as discussed by the ED physician.      Iqra Bowser RN  11/16/20 2821

## 2020-11-20 ENCOUNTER — HOSPITAL ENCOUNTER (OUTPATIENT)
Dept: ULTRASOUND IMAGING | Age: 30
Discharge: HOME | End: 2020-11-20
Payer: COMMERCIAL

## 2020-11-20 PROCEDURE — 76830 TRANSVAGINAL US NON-OB: CPT

## 2020-11-20 PROCEDURE — 93975 VASCULAR STUDY: CPT

## 2020-12-08 ENCOUNTER — TELEPHONE (OUTPATIENT)
Dept: FAMILY MEDICINE CLINIC | Age: 30
End: 2020-12-08

## 2020-12-08 RX ORDER — ONDANSETRON 4 MG/1
4 TABLET, ORALLY DISINTEGRATING ORAL EVERY 8 HOURS PRN
Qty: 30 TABLET | Refills: 5 | Status: SHIPPED | OUTPATIENT
Start: 2020-12-08 | End: 2021-03-05

## 2020-12-08 NOTE — TELEPHONE ENCOUNTER
Spoke to pt on phone, has large cyst left ovary, has seen OB/gyn but having increasing issues with nausea/hair growth on face/emotional. Pt has called OB last week but has not heard anything back. Has run out of zofran. Discussed possible pregnancy but pt denies possibility-has had tubes tied. Pt encouraged to wait until OB calls her back but pt should have f/u with ob asap. Call PCP back if worsening (or ER) .  Pt voices understanding

## 2020-12-08 NOTE — TELEPHONE ENCOUNTER
Doxy  not working. ... Pt had an appointment today for follow up from cysts on ovaries. Per pcp send note to have pcp call her.      [phone]

## 2020-12-11 ENCOUNTER — HOSPITAL ENCOUNTER (OUTPATIENT)
Dept: ULTRASOUND IMAGING | Age: 30
Discharge: HOME | End: 2020-12-11
Payer: COMMERCIAL

## 2020-12-11 PROCEDURE — 93975 VASCULAR STUDY: CPT

## 2020-12-11 PROCEDURE — 76830 TRANSVAGINAL US NON-OB: CPT

## 2021-02-09 ENCOUNTER — TELEPHONE (OUTPATIENT)
Dept: BARIATRICS/WEIGHT MGMT | Age: 31
End: 2021-02-09

## 2021-02-09 DIAGNOSIS — E66.01 CLASS 3 SEVERE OBESITY DUE TO EXCESS CALORIES WITHOUT SERIOUS COMORBIDITY WITH BODY MASS INDEX (BMI) OF 40.0 TO 44.9 IN ADULT (HCC): Primary | ICD-10-CM

## 2021-03-05 ENCOUNTER — OFFICE VISIT (OUTPATIENT)
Dept: BARIATRICS/WEIGHT MGMT | Age: 31
End: 2021-03-05
Payer: COMMERCIAL

## 2021-03-05 VITALS
WEIGHT: 252.7 LBS | TEMPERATURE: 98.7 F | DIASTOLIC BLOOD PRESSURE: 78 MMHG | HEIGHT: 65 IN | HEART RATE: 84 BPM | SYSTOLIC BLOOD PRESSURE: 110 MMHG | RESPIRATION RATE: 18 BRPM | BODY MASS INDEX: 42.1 KG/M2

## 2021-03-05 DIAGNOSIS — E66.01 MORBID OBESITY WITH BMI OF 40.0-44.9, ADULT (HCC): Primary | ICD-10-CM

## 2021-03-05 DIAGNOSIS — R10.11 RUQ PAIN: ICD-10-CM

## 2021-03-05 PROCEDURE — G8484 FLU IMMUNIZE NO ADMIN: HCPCS | Performed by: SURGERY

## 2021-03-05 PROCEDURE — G8417 CALC BMI ABV UP PARAM F/U: HCPCS | Performed by: SURGERY

## 2021-03-05 PROCEDURE — 99203 OFFICE O/P NEW LOW 30 MIN: CPT | Performed by: SURGERY

## 2021-03-05 PROCEDURE — G8427 DOCREV CUR MEDS BY ELIG CLIN: HCPCS | Performed by: SURGERY

## 2021-03-05 PROCEDURE — 1036F TOBACCO NON-USER: CPT | Performed by: SURGERY

## 2021-03-05 ASSESSMENT — ENCOUNTER SYMPTOMS
COLOR CHANGE: 0
VOMITING: 0
ABDOMINAL PAIN: 1
DIARRHEA: 0
TROUBLE SWALLOWING: 0
VOICE CHANGE: 0
WHEEZING: 0
SORE THROAT: 0
COUGH: 0
ABDOMINAL DISTENTION: 1
PHOTOPHOBIA: 0
BLOOD IN STOOL: 0
CONSTIPATION: 0
NAUSEA: 0
ANAL BLEEDING: 0

## 2021-03-05 NOTE — PROGRESS NOTES
Bariatric Surgery Consultation    Paco Bird, 1990, 35 y.o.,  female, CSN:   [id number]     Chief Complaint:    Chief Complaint   Patient presents with    Bariatric, Initial Visit     1st WM visit, diet, exercise, and pre-surgical weight loss. SUBJECTIVE:  Ifeoma Juarez is a 35 y.o. female being seen for morbid obesity, considering weight loss surgery; Judy's, Height: 5' 5\" (165.1 cm), Weight: 252 lb 11.2 oz (114.6 kg), Current Body mass index is 42.05 kg/m². The patient's PCP is the referring physician Estrada Sewell PA-C      HPI:  Ifeoma Juarez has suffered from overweight / severe obesity for (6) years, and was of gradual onset but progressive course - tried MANY different diets and on and off over the weeks, months and years depression post partum, accompanied by a divorce. 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 Quitting now 1PPD x 16 yrs    The patient first recognized that she had a weight problem about 6 years ago. The patient's lowest weight in the last five years was 220 lbs, and the highest weight in the last five years was 310 lbs. Weight Loss Program History:  The patient states she has tried regular diet and exercise. The most weight lost ever with diet and exercise was 60 Lbs, but unfortunately only kept them of for 1year. These attempts have been temporarily successful with weight loss, but have failed to sustain adequate weight loss. Mortality from the morbid obesity is very high:       Belles life is significantly affected by weight related to her co-morbidities. The patient has also tried but failed self directed diet and exercise. Comorbid Conditions:  Significant diseases affecting this patient are   Past Medical History:   Diagnosis Date    Depression     Diabetes mellitus ([facility] [address]     type 2-diet controlled.     Group B streptococcal infection during pregnancy      (normal spontaneous vaginal delivery) 7-2-15    delivered in ambulance 12 minutes    Obesity, unspecified     Pancreatitis chronic     Has been on medication for diabetes, but changed to diet a few months ago.  Problems with hearing     dr Astrid Zhao; only due to when she had a cold     And     Review of Systems - Review of Systems   Constitutional: Negative for activity change, chills, diaphoresis and fever. HENT: Negative for sore throat, trouble swallowing and voice change. Eyes: Negative for photophobia and visual disturbance. Respiratory: Negative for cough and wheezing. Cardiovascular: Negative for chest pain and palpitations. Gastrointestinal: Positive for abdominal distention and abdominal pain (RUQ, pain, and pancreatitis, x 2 hospitalized 2 for this. Vira Libman ). Negative for anal bleeding, blood in stool, constipation, diarrhea, nausea and vomiting. Endocrine: Positive for polyphagia. Negative for cold intolerance, heat intolerance, polydipsia and polyuria. Genitourinary: Positive for urgency (Often). Negative for dysuria, frequency and hematuria. Musculoskeletal: Negative for joint swelling, myalgias and neck stiffness. Skin: Negative for color change and rash. Neurological: Negative for seizures, speech difficulty, light-headedness and numbness. Hematological: Negative for adenopathy. Does not bruise/bleed easily. Psychiatric/Behavioral: Negative for sleep disturbance. The patient is nervous/anxious. All others reviewed and are negative. Allergies: Allergies   Allergen Reactions    Wool Alcohol [Lanolin] Hives     \"wool\" ; hives/ blisters       Medications:  No current outpatient medications on file. No current facility-administered medications for this visit.         Past Surgical History:  Past Surgical History:   Procedure Laterality Date    HEMORRHOID SURGERY      x3    TUBAL LIGATION  2015 August    VAGINA SURGERY      reconstruction between vag/anal area from childbirth       Family History: Family History   Adopted: Yes   Problem Relation Age of Onset    Breast Cancer Mother     Ovarian Cancer Mother     No Known Problems Father     Other Sister         autistic (1 sister)    Cancer Brother         eye, testicular       Social History:  Social History     Socioeconomic History    Marital status:      Spouse name: Not on file    Number of children: Not on file    Years of education: Not on file    Highest education level: Not on file   Occupational History    Not on file   Social Needs    Financial resource strain: Not on file    Food insecurity     Worry: Not on file     Inability: Not on file   [employer] needs     Medical: Not on file     Non-medical: Not on file   Tobacco Use    Smoking status: Former Smoker     Packs/day: 0.50     Years: 8.00     Pack years: 4.00     Types: Cigarettes     Quit date: 2021     Years since quittin.0    Smokeless tobacco: Never Used    Tobacco comment:  smoking less than a pack a week she stated she should stop   Substance and Sexual Activity    Alcohol use:  Yes     Alcohol/week: 0.0 standard drinks     Comment: socially    Drug use: Never    Sexual activity: Yes     Partners: Male   Lifestyle    Physical activity     Days per week: Not on file     Minutes per session: Not on file    Stress: Not on file   Relationships    Social connections     Talks on phone: Not on file     Gets together: Not on file     Attends Spiritism service: Not on file     Active member of club or organization: Not on file     Attends meetings of clubs or organizations: Not on file     Relationship status: Not on file    Intimate partner violence     Fear of current or ex partner: Not on file     Emotionally abused: Not on file     Physically abused: Not on file     Forced sexual activity: Not on file   Other Topics Concern    Not on file   Social History Narrative    Not on file         OBJECTIVE:     Physical Exam   /78   Pulse 84   Temp 98.7 °F (37.1 °C) (Infrared)   Resp 18   Ht 5' 5\" (1.651 m)   Wt 252 lb 11.2 oz (114.6 kg)   LMP 2021   BMI 42.05 kg/m²    Constitutional:  Vital signs are normal. The patient appears well-developed and well-nourished. Head: Normocephalic. Neck: No mass and no thyromegaly present. Cardiovascular: Normal rate, regular rhythm, S1 normal and S2 normal.  No murmurs. Edema Pulmonary/Chest: Effort normal and breath sounds normal.   Abdominal: Soft. Normal appearance. There is no splenomegaly, but fatty liver. No tenderness. There is no rigidity, no rebound, no guarding and no Barrow's sign. Musculoskeletal:        Right lower leg: Normal. No tenderness and no edema. Left lower leg: Normal. No tenderness and no edema. Lymphadenopathy:     No cervical adenopathy. No axillary adenopathy. Skin: Skin is warm, dry and intact. No rash. Psychiatric: The patient is alert and oriented. The patient has a normal mood and affect. Speech is normal and behavior is normal. Judgment and thought content normal. Cognition and memory are normal.     ASSESSMENT & PLAN:    Patient Active Problem List   Diagnosis    Weight gain    Pre-diabetes    Lesion of buccal mucosa    Tobacco use    Obesity    Moderate episode of recurrent major depressive disorder (HCC)    Anxiety    Positive depression screening    Generalized body aches    Viral URI    Morbid obesity with BMI of 40.0-44.9, adult (HCC)       Needs Sleeve gastrectomy, will follow. The patient is good candidate for surgery. The patient is interested in the RoboticSleeve Gastrectomy. Will continue work up toward surgery. Counseled extensively regardin) DIET and MONITOR the Weight:  - 1500 Kcal Diet/day.   - Write down everything you eat and drink for 1 wk  - No calories from drinks  - Slim fast diet: 4 cans per day 1-2 days a week with only NO caloriesdrinks those days    2) EXERCISE: 1 hr/day 5 days a week    3) DIETITIAN / NUTRITIONAL CONSULT, evaluation and counseling to porter healthy diet ~1500 Kcal /day    4) EXERCISE PHYSIOLOGIST CONSULT    5)PSYCHOLOGICAL EVALUATION    6) MONTHLY FOLLOW UP,  to follow and document diet and exercise trial    7) Planning a Sleeve Gastrectomy in few months    8) 2 Pictures were taken today to concretely monitorweight loss over time. 9) CARDIOLOGY OPTIMIZATION AND CLEARANCE BEFORE SURGERY    10) PULMONOLOGY OPTIMIZATION AND CLEARANCE BEFORE SURGERY    WILL CHECK BASELINE BARIATRIC COMPREHENSIVE LABS. Orders Placed This Encounter   Procedures    Hemoglobin A1C    TSH with Reflex    Amb Referral to Nutrition Services    Ambulatory referral to Physical Therapy        Pt was handed a food diary notebook to help monitor Salvador intake, and patientinformation pamphlet on Sleeve Gastrectomy - IF her symptoms of GERD, are not from a major GERD, HH, or Martinez's. I counseled the patient regarding weight loss, appropriate diet and exercise, and healthy eating habits.    - Eat slow, and chew 50-60 times before swallowing  - Eat smaller portions in smaller plates  - Weigh yourself at least 2-3 times a week    The patient will be scheduled for a follow up visit in [place] in about 4 weeks (around 4/2/2021) for Bariatric follow up: diet, exercise & weight loss, For imaging and tests results review. .    Bariatric 1200 calorie diet, ANANYA, heart healthy, 60-80 gram protein.    ____________________________________________    Carolyn Hawthorne MD, FACS, FICS  Member of the American Society of Metabolic and Bariatric Surgeons    3/5/2021  3:55 PM

## 2021-03-08 ENCOUNTER — HOSPITAL ENCOUNTER (OUTPATIENT)
Age: 31
Discharge: HOME | End: 2021-03-08
Payer: COMMERCIAL

## 2021-03-08 LAB
ESTIMATED AVERAGE GLUCOSE: 134 MG/DL
HBA1C MFR BLD: 6.3 % (ref 4.2–6.3)
TSH HIGH SENSITIVITY: 3.4 UIU/ML (ref 0.27–4.2)

## 2021-03-08 PROCEDURE — 83036 HEMOGLOBIN GLYCOSYLATED A1C: CPT

## 2021-03-08 PROCEDURE — 84443 ASSAY THYROID STIM HORMONE: CPT

## 2021-03-08 PROCEDURE — 36415 COLL VENOUS BLD VENIPUNCTURE: CPT

## 2021-03-15 ENCOUNTER — HOSPITAL ENCOUNTER (OUTPATIENT)
Dept: PHYSICAL THERAPY | Age: 31
Setting detail: THERAPIES SERIES
Discharge: HOME | End: 2021-03-15
Payer: COMMERCIAL

## 2021-03-15 PROCEDURE — 97161 PT EVAL LOW COMPLEX 20 MIN: CPT

## 2021-03-15 NOTE — PROGRESS NOTES
Physical Therapy  Initial Assessment  Date: 3/15/2021  Patient Name: Lanny Najjar  MRN: [id number]  : 1990     Treatment Diagnosis: obesity    Restrictions   none    Subjective   General  Chart Reviewed: Yes  Patient assessed for rehabilitation services?: Yes  Family / Caregiver Present: No  Referring Practitioner: Malika Roach  Diagnosis: Obesity  Follows Commands: Within Functional Limits  Subjective  Subjective: patient seen for pre bariatric surgery PT eval  Pain Screening  Patient Currently in Pain: No  Vital Signs  Patient Currently in Pain: No    Vision/Hearing  Vision  Vision: Within Functional Limits  Hearing  Hearing: Within functional limits    Orientation  Orientation  Overall Orientation Status: Within Normal Limits    Social/Functional History  Social/Functional History  Lives With: Family    Objective          AROM RLE (degrees)  RLE AROM: WFL  AROM LLE (degrees)  LLE AROM : WFL    Strength RLE  Strength RLE: WFL  Strength LLE  Strength LLE: WFL          Current Physical activity: walking 5-6 hours -avg 10,000- 12 000 steps/day ; [employer] membership. roller skating weekly  Resources/Support;  [employer] membership/ [employer]; online groups  Barriers: time  6 MWT 1657 ft     117 bpm  RHR 79           Utilizing group and individual instruction, patient will be educated in physical activity/ exercise concepts including use of basic fitness equipment and developing a personal exercise program       Include in note:     Group intervention included in plan    Discharge after group session                                               Low complexity eval            Therapy Time   Individual Concurrent Group Co-treatment   Time In 1100         Time Out 1133         Minutes [address], PT

## 2021-03-15 NOTE — PLAN OF CARE
Outpatient Physical Therapy           [place]           [] Phone: [phone]   Fax: [phone]  [place]           [x] Phone: [phone]   Fax: [phone]     To: Referring Practitioner: Damon Pearson    From: Rosy Leon PT     Patient: Sharon Carreon       : 1990  Diagnosis: Diagnosis: Obesity   Treatment Diagnosis: Treatment Diagnosis: obesity   Date: 3/15/2021    Physical Therapy Certification/Re-Certification Form  . The following patient has been evaluated for physical therapy services and for therapy to continue, insurance requires physician review of the treatment plan initially and every 90 days. Please review the attached evaluation and/or summary of the patient's plan of care, and verify that you agree therapy should continue by signing the attached document and sending it back to our office. Assessment:    Assessment:  6 MWT    Plan of Care/Treatment to date:  [x] Therapeutic Exercise including group  [] Modalities:  [] Therapeutic Activity     [] Ultrasound  [] Electrical Stimulation  [] Gait Training      [] Cervical Traction [] Lumbar Traction  [] Neuromuscular Re-education    [] Cold/hotpack [] Iontophoresis   [x] Instruction in HEP Including group     [] Vasopneumatic    [] Dry Needling  [] Manual Therapy               [] Aquatic Therapy       Other:          Frequency/Duration:-discharge after group session  # Days per week: [x] 1 day # Weeks: [x] 2 sessions                  Rehab Potential/Progress: [] Excellent [x] Good [] Fair  [] Poor     Goals:    Utilizing group and individual instruction, patient will be educated in physical activity/ exercise concepts including use of basic fitness equipment and developing a personal exercise program  Patient's goal lose weight        Electronically signed by:  Rosy Leon PT, 3/15/2021, 11:45 AM  If you have any questions or concerns, please don't hesitate to call.   Thank you for your referral.      Physician

## 2021-03-16 ENCOUNTER — HOSPITAL ENCOUNTER (OUTPATIENT)
Dept: ULTRASOUND IMAGING | Age: 31
Discharge: HOME | End: 2021-03-16
Payer: COMMERCIAL

## 2021-03-16 DIAGNOSIS — E66.01 MORBID OBESITY WITH BMI OF 40.0-44.9, ADULT (HCC): ICD-10-CM

## 2021-03-16 DIAGNOSIS — R10.11 RUQ PAIN: ICD-10-CM

## 2021-03-16 PROCEDURE — 76705 ECHO EXAM OF ABDOMEN: CPT

## 2021-03-29 ENCOUNTER — TELEMEDICINE (OUTPATIENT)
Dept: BARIATRICS/WEIGHT MGMT | Age: 31
End: 2021-03-29

## 2021-03-29 DIAGNOSIS — E66.01 MORBID OBESITY WITH BMI OF 40.0-44.9, ADULT (HCC): Primary | ICD-10-CM

## 2021-03-29 PROCEDURE — 99999 PR OFFICE/OUTPT VISIT,PROCEDURE ONLY: CPT

## 2021-03-29 NOTE — PROGRESS NOTES
Outpatient Nutrition Counseling    REASON FOR VISIT: Initial Nutrition Visit    Chief Complaint:    Chief Complaint   Patient presents with    Weight Management       SUBJECTIVE:  Pt was seen via video for initial nutrition visit. Emailed all handouts and voiced receipt. Pt instructed on mindful eating, label reading, calorie counting, healthy food choices, and goal setting. Pt verbalized understanding of all info provided. The patient is a 35 y.o. female being seen for morbid obesity, considering weight loss surgery; Judy's,  ,  , Current There is no height or weight on file to calculate BMI. The patient's PCP is Kaden Florentino PA-C   The patient first recognized that she had a weight problem about 10 years ago. The patient's lowest weight in the last five years was 200 lbs, and the highest weight in the last five years was 252 lbs. Weight Loss Program History:  The patient states she has tried keto, slim fast, calorie counting. The most weight lost ever with diet and exercise was 20 Lbs, but unfortunately only kept them of for a short time. Judy's life is significantlyaffected by weight related to her co-morbidities. Comorbid Conditions:  Significant diseases affecting this patient are   Past Medical History:   Diagnosis Date    Depression     Diabetes mellitus ([facility] [address]     type 2-diet controlled.  Group B streptococcal infection during pregnancy      (normal spontaneous vaginal delivery) 7-2-15    delivered in ambulance 12 minutes    Obesity, unspecified     Pancreatitis chronic     Has been on medication for diabetes, but changed to diet a few months ago.  Problems with hearing     dr Karen Sánchez; only due to when she had a cold   . Review of Systems - Review of Systems  Otherwise per HPI. Allergies:   Allergies   Allergen Reactions    Wool Alcohol [Lanolin] Hives     \"wool\" ; hives/ blisters       Past Surgical History:  Past Surgical History:   Procedure Laterality Date    HEMORRHOID SURGERY      x3    TUBAL LIGATION  2015    VAGINA SURGERY      reconstruction between vag/anal area from childbirth       Family History:  Family History   Adopted: Yes   Problem Relation Age of Onset    Breast Cancer Mother     Ovarian Cancer Mother     No Known Problems Father     Other Sister         autistic (1 sister)    Cancer Brother         eye, testicular       Social History:  Social History     Socioeconomic History    Marital status:      Spouse name: Not on file    Number of children: Not on file    Years of education: Not on file    Highest education level: Not on file   Occupational History    Not on file   Social Needs    Financial resource strain: Not on file    Food insecurity     Worry: Not on file     Inability: Not on file   [employer] needs     Medical: Not on file     Non-medical: Not on file   Tobacco Use    Smoking status: Former Smoker     Packs/day: 0.50     Years: 8.00     Pack years: 4.00     Types: Cigarettes     Quit date: 2021     Years since quittin.0    Smokeless tobacco: Never Used    Tobacco comment:  smoking less than a pack a week she stated she should stop   Substance and Sexual Activity    Alcohol use:  Yes     Alcohol/week: 0.0 standard drinks     Comment: socially    Drug use: Never    Sexual activity: Yes     Partners: Male   Lifestyle    Physical activity     Days per week: Not on file     Minutes per session: Not on file    Stress: Not on file   Relationships    Social connections     Talks on phone: Not on file     Gets together: Not on file     Attends Hinduism service: Not on file     Active member of club or organization: Not on file     Attends meetings of clubs or organizations: Not on file     Relationship status: Not on file    Intimate partner violence     Fear of current or ex partner: Not on file     Emotionally abused: Not on file     Physically abused: Not on file     Forced sexual activity: Not on file   Other Topics Concern    Not on file   Social History Narrative    Not on file         OBJECTIVE:  Physical Exam   There were no vitals taken for this visit.        NUTRITION DIAGNOSIS: Overweight / Obesity   Problem: Increased adiposity compared to reference standard or established norms   Etiology: Excess intake compared to output over time   S/S: Ht: 65\" Wt: 245 lbs BMI: 41    NUTRITION INTERVENTIONS:    Individualized treatment goals to address nutritiondiagnosis:   Instructed on 1200 kcal diet for weight loss   Provided pt handbook, food lists, and goal card   Encouraged Physical activity as approved by physician    MONITORING/ EVALUATION/ PLAN:   Pt verbalized understanding of allmaterials covered   Pt asked pertinent questions throughout the session - expect compliance with nutrition guidelines presented   Provided pt with contact information should questions arise prior to next visit   Will f/u with pt in 2-3 months for further education   Bárbara Stringer MS, RDN, LD  3/29/2021

## 2021-04-28 ENCOUNTER — HOSPITAL ENCOUNTER (OUTPATIENT)
Dept: PHYSICAL THERAPY | Age: 31
Setting detail: THERAPIES SERIES
Discharge: HOME | End: 2021-04-28
Payer: COMMERCIAL

## 2021-04-28 ENCOUNTER — TELEPHONE (OUTPATIENT)
Dept: CARDIOLOGY CLINIC | Age: 31
End: 2021-04-28

## 2021-04-28 ENCOUNTER — OFFICE VISIT (OUTPATIENT)
Dept: BARIATRICS/WEIGHT MGMT | Age: 31
End: 2021-04-28
Payer: COMMERCIAL

## 2021-04-28 VITALS
HEART RATE: 80 BPM | SYSTOLIC BLOOD PRESSURE: 110 MMHG | HEIGHT: 65 IN | BODY MASS INDEX: 42.05 KG/M2 | OXYGEN SATURATION: 98 % | DIASTOLIC BLOOD PRESSURE: 76 MMHG | WEIGHT: 252.4 LBS

## 2021-04-28 DIAGNOSIS — E66.01 MORBID OBESITY WITH BMI OF 40.0-44.9, ADULT (HCC): Primary | ICD-10-CM

## 2021-04-28 PROCEDURE — G8417 CALC BMI ABV UP PARAM F/U: HCPCS | Performed by: SURGERY

## 2021-04-28 PROCEDURE — G8427 DOCREV CUR MEDS BY ELIG CLIN: HCPCS | Performed by: SURGERY

## 2021-04-28 PROCEDURE — 1036F TOBACCO NON-USER: CPT | Performed by: SURGERY

## 2021-04-28 PROCEDURE — 99213 OFFICE O/P EST LOW 20 MIN: CPT | Performed by: SURGERY

## 2021-04-28 PROCEDURE — 97150 GROUP THERAPEUTIC PROCEDURES: CPT

## 2021-04-28 ASSESSMENT — ENCOUNTER SYMPTOMS
ABDOMINAL PAIN: 1
ABDOMINAL DISTENTION: 1
SORE THROAT: 0
ANAL BLEEDING: 0
COLOR CHANGE: 0
WHEEZING: 0
CONSTIPATION: 0
NAUSEA: 0
VOMITING: 0
BACK PAIN: 1
DIARRHEA: 0
PHOTOPHOBIA: 0
BLOOD IN STOOL: 0
TROUBLE SWALLOWING: 0
SHORTNESS OF BREATH: 1
VOICE CHANGE: 0
COUGH: 0

## 2021-04-28 NOTE — DISCHARGE SUMMARY
guided through an exercises routine with a 5-minute walking warm up with light stretches for the calf muscles, upper back, shoulder. Patient was guided through a simple circuit routine involving, dumbbell curls, marches, sit to stand, lateral dumbbell raises, step ups, lateral side steps, sled pull and wall taps. Patient received an explanation on how to conduct such routine at home and modifications to exercises. Patient did not actually go through such circuit secondary to Covid and class size. Patient did not have any adverse reactions after the group therapy and reported they felt fine no issues. Patient left with a exercises information packet and explained to call if there are any additional questions. Patient will be discharged from therapy services after today.      Billed 1 group charge     Time: 0821-8071    Veda Fu PT, DPT, OCS    4/28/2021 8:42 AM

## 2021-04-28 NOTE — PROGRESS NOTES
BARIATRIC SURGERY OFFICE NOTE    SUBJECTIVE:    Patient presenting today originally referred from Shraddha Cuadra PA-C, for   Chief Complaint   Patient presents with    Weight Management     2nd surg wm program    .    HPI: Pancho Cortes is a 35 y.o. female being seen for follow up for bariatric weight loss surgery.  month weight gain/loss was -0.3 Lbs. Labs and US reviewed and d/w her;  Needs sleeve gastrectomy, and will do. Thoroughly reviewed the patient's medical history, family history, social history and review of systems with the patient today in theoffice. Please see medical record for pertinent positives. Past Medical History:   Diagnosis Date    Depression     Diabetes mellitus ([facility] [address]     type 2-diet controlled.  Group B streptococcal infection during pregnancy      (normal spontaneous vaginal delivery) 7-2-15    delivered in ambulance 12 minutes    Obesity, unspecified     Pancreatitis chronic     Has been on medication for diabetes, but changed to diet a few months ago.  Problems with hearing     dr Tyler Munguia; only due to when she had a cold      Past Surgical History:   Procedure Laterality Date    HEMORRHOID SURGERY      x3    TUBAL LIGATION  2015    VAGINA SURGERY      reconstruction between vag/anal area from childbirth     No current outpatient medications on file. No current facility-administered medications for this visit. Allergies   Allergen Reactions    Wool Alcohol [Lanolin] Hives     \"wool\" ; hives/ blisters           Review of Systems   Constitutional: Positive for fatigue. Negative for activity change, chills, diaphoresis and fever. HENT: Negative for sore throat, trouble swallowing and voice change. Eyes: Negative for photophobia and visual disturbance. Respiratory: Positive for shortness of breath. Negative for cough and wheezing. Cardiovascular: Positive for leg swelling. Negative for chest pain and palpitations. Gastrointestinal: Positive for abdominal distention and abdominal pain. Negative for anal bleeding, blood in stool, constipation, diarrhea, nausea and vomiting. Endocrine: Positive for polyphagia. Negative for cold intolerance, heat intolerance, polydipsia and polyuria. Genitourinary: Positive for urgency. Negative for dysuria, frequency and hematuria. Musculoskeletal: Positive for arthralgias, back pain and gait problem. Negative for joint swelling, myalgias and neck stiffness. Skin: Negative for color change and rash. Neurological: Negative for seizures, speech difficulty, light-headedness and numbness. Hematological: Negative for adenopathy. Does not bruise/bleed easily. Psychiatric/Behavioral: Positive for sleep disturbance. The patient is nervous/anxious. OBJECTIVE:    Vitals:    04/28/21 1210   BP: 110/76   Pulse: 80   SpO2: 98%     Body mass index is 42 kg/m². Physical Exam  Vitals signs reviewed. Constitutional:       General: She is not in acute distress. Appearance: She is well-developed. She is not diaphoretic. HENT:      Head: Normocephalic and atraumatic. Eyes:      General: No scleral icterus. Conjunctiva/sclera: Conjunctivae normal.      Pupils: Pupils are equal, round, and reactive to light. Neck:      Musculoskeletal: Normal range of motion. Thyroid: No thyromegaly. Vascular: No JVD. Trachea: No tracheal deviation. Cardiovascular:      Rate and Rhythm: Normal rate and regular rhythm. Heart sounds: Normal heart sounds. No murmur. No friction rub. No gallop. Pulmonary:      Effort: Pulmonary effort is normal. No respiratory distress. Breath sounds: No stridor. No wheezing or rales. Chest:      Chest wall: No tenderness. Abdominal:      General: Bowel sounds are normal. There is no distension. Palpations: Abdomen is soft. There is no mass. Tenderness: There is no abdominal tenderness. There is no guarding or rebound. Musculoskeletal: Normal range of motion. General: No tenderness. Lymphadenopathy:      Cervical: No cervical adenopathy. Skin:     General: Skin is warm and dry. Coloration: Skin is not pale. Findings: No erythema or rash. Neurological:      Mental Status: She is alert and oriented to person, place, and time. Cranial Nerves: No cranial nerve deficit. Coordination: Coordination normal.   Psychiatric:         Behavior: Behavior normal.         Thought Content: Thought content normal.         Judgment: Judgment normal.                 ASSESSMENT & PLAN:    1. Morbid obesity with BMI of 40.0-44.9, adult ([facility] [address]         1200 / day. Patient was encouraged to journal all food intake. Keep calorie level atapproximately 3181-1109. Protein intake is to be a minimum of 60-80 grams per day. Water drinking was encouraged with a goal of 64oz-128oz daily. Beverages to be calorie free except for milk. Every other beverage should bewater. They are to avoid soda. Continue to increase level of physical activity. I counseled the patient regarding diet and exercise, in preparation for her planned Robotic Sleeve Gastrectomy. Counting calories, complying with the dietitian's recommendations, and complying with the preoperative workup including the dietitian counseling, exercise physiologist counseling,cardiologist evaluation and pre-operative optimization, pulmonologist evaluation and pre operative optimization, pre operative EGD evaluation. The patient expressed understanding and willingness to comply nicely; allquestions and concerns addressed in details. Patient counseled on the risks, benefits, and alternatives oftreatment plan at length while in the office today. Patient states an understanding and willingness to proceed with the plan.       Orders Placed This Encounter   Procedures    Ambulatory referral to Cardiology    Ambulatory referral to Pulmonology        Follow Up:  Return in about 4 weeks (around 5/26/2021) for Bariatric follow up: diet, exercise & weight loss, Follow up Symptoms.       Chino Hubbard MD, FACS, FICS  Member of the [employer] of Metabolic and Bariatric Surgeons    [phone]    4/28/21

## 2021-05-04 ENCOUNTER — TELEPHONE (OUTPATIENT)
Dept: CARDIOLOGY CLINIC | Age: 31
End: 2021-05-04

## 2021-05-26 ENCOUNTER — OFFICE VISIT (OUTPATIENT)
Dept: BARIATRICS/WEIGHT MGMT | Age: 31
End: 2021-05-26
Payer: COMMERCIAL

## 2021-05-26 VITALS
OXYGEN SATURATION: 100 % | SYSTOLIC BLOOD PRESSURE: 120 MMHG | DIASTOLIC BLOOD PRESSURE: 80 MMHG | BODY MASS INDEX: 41.4 KG/M2 | HEART RATE: 96 BPM | HEIGHT: 65 IN | WEIGHT: 248.5 LBS

## 2021-05-26 DIAGNOSIS — E66.01 MORBID OBESITY WITH BMI OF 40.0-44.9, ADULT (HCC): Primary | ICD-10-CM

## 2021-05-26 PROCEDURE — G8417 CALC BMI ABV UP PARAM F/U: HCPCS | Performed by: SURGERY

## 2021-05-26 PROCEDURE — G8427 DOCREV CUR MEDS BY ELIG CLIN: HCPCS | Performed by: SURGERY

## 2021-05-26 PROCEDURE — 1036F TOBACCO NON-USER: CPT | Performed by: SURGERY

## 2021-05-26 PROCEDURE — 99213 OFFICE O/P EST LOW 20 MIN: CPT | Performed by: SURGERY

## 2021-05-26 ASSESSMENT — ENCOUNTER SYMPTOMS
SORE THROAT: 0
DIARRHEA: 0
ABDOMINAL PAIN: 1
ABDOMINAL DISTENTION: 1
NAUSEA: 0
VOMITING: 0
PHOTOPHOBIA: 0
COLOR CHANGE: 0
TROUBLE SWALLOWING: 0
SHORTNESS OF BREATH: 1
VOICE CHANGE: 0
BLOOD IN STOOL: 0
WHEEZING: 0
COUGH: 0
BACK PAIN: 1
CONSTIPATION: 0
ANAL BLEEDING: 0

## 2021-05-26 NOTE — PROGRESS NOTES
BARIATRIC SURGERY OFFICE NOTE    SUBJECTIVE:    Patient presenting today originally referred from Jen Slater PA-C, for   Chief Complaint   Patient presents with   [facility] Weight Management     labs need ordered 3surg wm    . HPI: Kat Heller is a 35 y.o. female being seen for follow up for bariatric weight loss surgery. Judy month weight gain/loss was -3.8 Lbs. Addressed the status of the following co-morbidities:   1. Anxiety  worsening. 2. Tobaco use  Quit 3 months ago. And tested negative for 15 $ charge per week if she smokes. .  3. Depressive improving. Drinks alcohol, counseled. Thoroughly reviewed the patient's medical history, family history, social history and review of systems with the patient today in theoffice. Please see medical record for pertinent positives. Past Medical History:   Diagnosis Date    Depression     Diabetes mellitus ([address]     type 2-diet controlled.  Group B streptococcal infection during pregnancy      (normal spontaneous vaginal delivery) 7-2-15    delivered in ambulance 12 minutes    Obesity, unspecified     Pancreatitis chronic     Has been on medication for diabetes, but changed to diet a few months ago.  Problems with hearing     dr Hamida Bermudez; only due to when she had a cold      Past Surgical History:   Procedure Laterality Date    HEMORRHOID SURGERY      x3    TUBAL LIGATION  2015    VAGINA SURGERY      reconstruction between vag/anal area from childbirth     No current outpatient medications on file. No current facility-administered medications for this visit. Allergies   Allergen Reactions    Wool Alcohol [Lanolin] Hives     \"wool\" ; hives/ blisters           Review of Systems   Constitutional: Positive for fatigue. Negative for activity change, chills, diaphoresis and fever. HENT: Negative for sore throat, trouble swallowing and voice change. Eyes: Negative for photophobia and visual disturbance. Respiratory: Positive for shortness of breath. Negative for cough and wheezing. Cardiovascular: Positive for leg swelling. Negative for chest pain and palpitations. Gastrointestinal: Positive for abdominal distention and abdominal pain. Negative for anal bleeding, blood in stool, constipation, diarrhea, nausea and vomiting. Endocrine: Positive for polyphagia. Negative for cold intolerance, heat intolerance, polydipsia and polyuria. Genitourinary: Positive for urgency. Negative for dysuria, frequency and hematuria. Musculoskeletal: Positive for arthralgias, back pain and gait problem. Negative for joint swelling, myalgias and neck stiffness. Skin: Negative for color change and rash. Neurological: Negative for seizures, speech difficulty, light-headedness and numbness. Hematological: Negative for adenopathy. Does not bruise/bleed easily. Psychiatric/Behavioral: Positive for sleep disturbance. The patient is nervous/anxious. OBJECTIVE:    Vitals:    05/26/21 1625   BP: 120/80   Pulse: 96   SpO2: 100%     Body mass index is 41.35 kg/m². Physical Exam  Vitals reviewed. Constitutional:       General: She is not in acute distress. Appearance: She is well-developed. She is not diaphoretic. HENT:      Head: Normocephalic and atraumatic. Eyes:      General: No scleral icterus. Conjunctiva/sclera: Conjunctivae normal.      Pupils: Pupils are equal, round, and reactive to light. Neck:      Thyroid: No thyromegaly. Vascular: No JVD. Trachea: No tracheal deviation. Cardiovascular:      Rate and Rhythm: Normal rate and regular rhythm. Heart sounds: Normal heart sounds. No murmur heard. No friction rub. No gallop. Pulmonary:      Effort: Pulmonary effort is normal. No respiratory distress. Breath sounds: No stridor. No wheezing or rales. Chest:      Chest wall: No tenderness. Abdominal:      General: Bowel sounds are normal. There is no distension. Palpations: Abdomen is soft. There is no mass. Tenderness: There is no abdominal tenderness. There is no guarding or rebound. Musculoskeletal:         General: No tenderness. Normal range of motion. Cervical back: Normal range of motion. Lymphadenopathy:      Cervical: No cervical adenopathy. Skin:     General: Skin is warm and dry. Coloration: Skin is not pale. Findings: No erythema or rash. Neurological:      Mental Status: She is alert and oriented to person, place, and time. Cranial Nerves: No cranial nerve deficit. Coordination: Coordination normal.   Psychiatric:         Behavior: Behavior normal.         Thought Content: Thought content normal.         Judgment: Judgment normal.                 ASSESSMENT & PLAN:    1. Morbid obesity with BMI of 40.0-44.9, adult [facility])         Needs Car July 19, and Pulm June 28, and Psych, and WILL Do the EGD soon. Patient was encouraged to journal all food intake. Keep calorie level atapproximately 7819-8097. Protein intake is to be a minimum of 60-80 grams per day. Water drinking was encouraged with a goal of 64oz-128oz daily. Beverages to be calorie free except for milk. Every other beverage should bewater. They are to avoid soda. Continue to increase level of physical activity. I counseled the patient regarding diet and exercise, in preparation for her planned Robotic Sleeve Gastrectomy. Counting calories, complying with the dietitian's recommendations, and complying with the preoperative workup including the dietitian counseling, exercise physiologist counseling,cardiologist evaluation and pre-operative optimization, pulmonologist evaluation and pre operative optimization, pre operative EGD evaluation. The patient expressed understanding and willingness to comply nicely; allquestions and concerns addressed in details.     Patient counseled on the risks, benefits, and alternatives oftreatment plan at length while in the office today. Patient states an understanding and willingness to proceed with the plan. Follow Up:  Return in about 2 weeks (around 6/9/2021), or EGD.       Quiana Mena MD, FACS, FICS  Member of the [address] of Metabolic and Bariatric Surgeons    [phone]    5/26/21

## 2021-06-07 ENCOUNTER — TELEPHONE (OUTPATIENT)
Dept: SURGERY | Age: 31
End: 2021-06-07

## 2021-06-07 NOTE — TELEPHONE ENCOUNTER
LEFT MESSAGE FOR Bret Daily REGARDING SURGERY (egd ) SCHEDULED @ [facility].  NOTIFIED OF DATES, TIMES AND LOCATION    PHONE ASSESSMENT /PAT -  COVID - 6/29/21 9:30  SURGERY -7/6/21 845  P/O -7/21/2 9am    NPO AFTER MIDNIGHT

## 2021-06-14 ENCOUNTER — TELEPHONE (OUTPATIENT)
Dept: BARIATRICS/WEIGHT MGMT | Age: 31
End: 2021-06-14

## 2021-06-14 NOTE — TELEPHONE ENCOUNTER
[facility]#[id number]    CPT 37762    ICD k21.9 e66.01    [facility] outpatient 7/6/21

## 2021-06-25 NOTE — TELEPHONE ENCOUNTER
Pt called stating she was never told her EGD would be on 7/6. Documentation stating pt was given all information regarding procedure information. Pt is unable to do the 6th and will need rescheduled.  notified and given dates that do not work for pt, 7/6, 7/7 and 7/19. Also went over all dates for future appts.  Rescheduled appt time on 6/30 per pt request.

## 2021-06-28 ENCOUNTER — TELEPHONE (OUTPATIENT)
Dept: SURGERY | Age: 31
End: 2021-06-28

## 2021-06-28 NOTE — TELEPHONE ENCOUNTER
SPOKE TO / LEFT MESSAGE FOR Judy Frost REGARDING SURGERY (egd) SCHEDULED @ [facility].  NOTIFIED OF DATES, TIMES AND LOCATION    PHONE ASSESSMENT /PAT -  COVID - 7/13/21 10  SURGERY -7/20/21 930  P/O -7/21/21 9am    NPO AFTER MIDNIGHT

## 2021-06-30 ENCOUNTER — OFFICE VISIT (OUTPATIENT)
Dept: BARIATRICS/WEIGHT MGMT | Age: 31
End: 2021-06-30
Payer: COMMERCIAL

## 2021-06-30 VITALS
SYSTOLIC BLOOD PRESSURE: 116 MMHG | HEIGHT: 65 IN | WEIGHT: 250.5 LBS | BODY MASS INDEX: 41.73 KG/M2 | HEART RATE: 150 BPM | TEMPERATURE: 97.8 F | OXYGEN SATURATION: 96 % | DIASTOLIC BLOOD PRESSURE: 68 MMHG

## 2021-06-30 DIAGNOSIS — E66.01 MORBID OBESITY WITH BMI OF 40.0-44.9, ADULT (HCC): Primary | ICD-10-CM

## 2021-06-30 DIAGNOSIS — K64.2 COMPLEX THIRD DEGREE HEMORRHOID: ICD-10-CM

## 2021-06-30 PROCEDURE — G8417 CALC BMI ABV UP PARAM F/U: HCPCS | Performed by: SURGERY

## 2021-06-30 PROCEDURE — G8427 DOCREV CUR MEDS BY ELIG CLIN: HCPCS | Performed by: SURGERY

## 2021-06-30 PROCEDURE — 99214 OFFICE O/P EST MOD 30 MIN: CPT | Performed by: SURGERY

## 2021-06-30 PROCEDURE — 1036F TOBACCO NON-USER: CPT | Performed by: SURGERY

## 2021-06-30 RX ORDER — AMOXICILLIN 250 MG
2 CAPSULE ORAL DAILY
Qty: 120 TABLET | Refills: 5 | Status: SHIPPED | OUTPATIENT
Start: 2021-06-30 | End: 2021-07-10

## 2021-06-30 ASSESSMENT — ENCOUNTER SYMPTOMS
WHEEZING: 0
TROUBLE SWALLOWING: 0
NAUSEA: 0
ABDOMINAL DISTENTION: 1
ANAL BLEEDING: 0
ABDOMINAL PAIN: 1
DIARRHEA: 0
BACK PAIN: 1
PHOTOPHOBIA: 0
COUGH: 0
SORE THROAT: 0
VOMITING: 0
SHORTNESS OF BREATH: 1
BLOOD IN STOOL: 0
VOICE CHANGE: 0
COLOR CHANGE: 0
CONSTIPATION: 1

## 2021-06-30 NOTE — PROGRESS NOTES
BARIATRIC SURGERY OFFICE NOTE    SUBJECTIVE:    Patient presenting today originally referred from Jada Negrete PA-C, for   Chief Complaint   Patient presents with    Follow-up     4th wm surg visit    . HPI: Katrina Sena is a 28 y.o. female being seen for follow up for bariatric weight loss surgery. Judy month weight gain/loss was + 2 Lbs. Addressed the status of the following co-morbidities:   1. Depression  improving. 2. DM  improving. 3. Arthritis  worsening. Friends are inviting us. . home closing this week, and not exercising. . constipation. Kmi Grumbles and hemorrhoids. Thoroughly reviewed the patient's medical history, family history, social history and review of systems with the patient today in theoffice. Please see medical record for pertinent positives. Past Medical History:   Diagnosis Date    Depression     Diabetes mellitus ([facility] [address]     type 2-diet controlled.  Group B streptococcal infection during pregnancy      (normal spontaneous vaginal delivery) 7-2-15    delivered in ambulance 12 minutes    Obesity, unspecified     Pancreatitis chronic     Has been on medication for diabetes, but changed to diet a few months ago.  Problems with hearing     dr Michelle Cardenas; only due to when she had a cold      Past Surgical History:   Procedure Laterality Date    HEMORRHOID SURGERY      x3    TUBAL LIGATION  2015    VAGINA SURGERY      reconstruction between vag/anal area from childbirth     Current Outpatient Medications   Medication Sig Dispense Refill    senna-docusate (SENOKOT S) 8.6-50 MG per tablet Take 2 tablets by mouth daily for 10 days Take 2 tabs nightly to avoid constipation. Get well soon. 120 tablet 5     No current facility-administered medications for this visit. Allergies   Allergen Reactions    Wool Alcohol [Lanolin] Hives     \"wool\" ; hives/ blisters           Review of Systems   Constitutional: Positive for fatigue.  Negative for activity change, chills, diaphoresis and fever. HENT: Negative for sore throat, trouble swallowing and voice change. Eyes: Negative for photophobia and visual disturbance. Respiratory: Positive for shortness of breath. Negative for cough and wheezing. Cardiovascular: Positive for leg swelling. Negative for chest pain and palpitations. Gastrointestinal: Positive for abdominal distention, abdominal pain and constipation. Negative for anal bleeding, blood in stool, diarrhea, nausea and vomiting. Endocrine: Positive for polyphagia. Negative for cold intolerance, heat intolerance, polydipsia and polyuria. Genitourinary: Positive for urgency. Negative for dysuria, frequency and hematuria. Musculoskeletal: Positive for arthralgias, back pain and gait problem. Negative for joint swelling, myalgias and neck stiffness. Skin: Negative for color change and rash. Neurological: Negative for seizures, speech difficulty, light-headedness and numbness. Hematological: Negative for adenopathy. Does not bruise/bleed easily. Psychiatric/Behavioral: Positive for sleep disturbance. The patient is nervous/anxious. OBJECTIVE:    Vitals:    06/30/21 1542   BP: 116/68   Pulse: 150   Temp: 97.8 °F (36.6 °C)   SpO2: 96%     Body mass index is 41.69 kg/m². Physical Exam  Vitals reviewed. Constitutional:       General: She is not in acute distress. Appearance: She is well-developed. She is not diaphoretic. HENT:      Head: Normocephalic and atraumatic. Eyes:      General: No scleral icterus. Conjunctiva/sclera: Conjunctivae normal.      Pupils: Pupils are equal, round, and reactive to light. Neck:      Thyroid: No thyromegaly. Vascular: No JVD. Trachea: No tracheal deviation. Cardiovascular:      Rate and Rhythm: Normal rate and regular rhythm. Heart sounds: Normal heart sounds. No murmur heard. No friction rub. No gallop.     Pulmonary:      Effort: Pulmonary effort is normal. No respiratory distress. Breath sounds: No stridor. No wheezing or rales. Chest:      Chest wall: No tenderness. Abdominal:      General: Bowel sounds are normal. There is no distension. Palpations: Abdomen is soft. There is no mass. Tenderness: There is abdominal tenderness (anal tenderness at stage III complex hemorrhoids). There is no guarding or rebound. Musculoskeletal:         General: No tenderness. Normal range of motion. Cervical back: Normal range of motion. Lymphadenopathy:      Cervical: No cervical adenopathy. Skin:     General: Skin is warm and dry. Coloration: Skin is not pale. Findings: No erythema or rash. Neurological:      Mental Status: She is alert and oriented to person, place, and time. Cranial Nerves: No cranial nerve deficit. Coordination: Coordination normal.   Psychiatric:         Behavior: Behavior normal.         Thought Content: Thought content normal.         Judgment: Judgment normal.                 ASSESSMENT & PLAN:    1. Morbid obesity with BMI of 40.0-44.9, adult ([address]    2. Complex third degree hemorrhoid         Needs to exercise 30 min NOT too much 3 hrs, cardio and others. .  Needs to limit the Kcal 1200 /d. Cardiac on the 19th / lung (consultation done) EGD next 2 wks. Constipated and hemorrhoids recurrent stage III now. .    Patient was encouraged to journal all food intake. Keep calorie level atapproximately 2686-0362. Protein intake is to be a minimum of 60-80 grams per day. Water drinking was encouraged with a goal of 64oz-128oz daily. Beverages to be calorie free except for milk. Every other beverage should bewater. They are to avoid soda. Continue to increase level of physical activity. I counseled the patient regarding diet and exercise, in preparation for her planned Robotic Sleeve Gastrectomy.     Counting calories, complying with the dietitian's recommendations, and complying with the preoperative workup including

## 2021-07-13 ENCOUNTER — NURSE ONLY (OUTPATIENT)
Dept: SURGERY | Age: 31
End: 2021-07-13
Payer: COMMERCIAL

## 2021-07-13 ENCOUNTER — HOSPITAL ENCOUNTER (OUTPATIENT)
Age: 31
Setting detail: SPECIMEN
Discharge: HOME | End: 2021-07-13
Payer: COMMERCIAL

## 2021-07-13 VITALS — HEART RATE: 82 BPM | DIASTOLIC BLOOD PRESSURE: 86 MMHG | TEMPERATURE: 98.2 F | SYSTOLIC BLOOD PRESSURE: 124 MMHG

## 2021-07-13 DIAGNOSIS — Z01.818 PRE-OP TESTING: Primary | ICD-10-CM

## 2021-07-13 LAB
SARS-COV-2: NOT DETECTED
SOURCE: NORMAL

## 2021-07-13 PROCEDURE — U0003 INFECTIOUS AGENT DETECTION BY NUCLEIC ACID (DNA OR RNA); SEVERE ACUTE RESPIRATORY SYNDROME CORONAVIRUS 2 (SARS-COV-2) (CORONAVIRUS DISEASE [COVID-19]), AMPLIFIED PROBE TECHNIQUE, MAKING USE OF HIGH THROUGHPUT TECHNOLOGIES AS DESCRIBED BY CMS-2020-01-R: HCPCS

## 2021-07-13 PROCEDURE — 99211 OFF/OP EST MAY X REQ PHY/QHP: CPT | Performed by: SURGERY

## 2021-07-13 PROCEDURE — U0005 INFEC AGEN DETEC AMPLI PROBE: HCPCS

## 2021-07-13 NOTE — PATIENT INSTRUCTIONS
Pre-Procedure COVID-19 Self Testing  Quarantine Instructions  Day of Surgery Instructions         What to do before my surgery:    All patients scheduled for elective surgery must test for COVID19 72-96 hours prior to the surgery date.  Pre-Procedure COVID-19 Self-Test will be scheduled for you by your provider.  You can receive your Pre-Procedure COVID-19 Self-Test at:  [facility] Robotic Surgery Weight [facility]. [address] [facility], [facility], [facility], [address]   If you do not have the COVID-19 test we will cancel or reschedule your procedure   Once you test you must quarantine at home until after your procedure with only your immediate family members or whoever lives with you.  If you must work during your quarantine period, we ask that you continue to practice social distancing, wear a mask that covers your mouth and nose and perform all hand hygiene as recommended by the CDC.  If you must go to the grocery, etc. and cannot get someone to do this for you please wear a mask that covers your mouth and nose and perform all hand hygiene as recommended by the CDC.  Your surgeon's office will notify you with any concerns about your test result. What can I expect on the day of surgery?  Arrive at the time the office or hospital staff tell you on the day of your procedure.  Wear a mask when entering the hospital.     A member of the hospital staff will take your temperature and ask you a few questions as you enter the building.  In abundance of caution for the safety of all our patients and staff, please follow all hospital visitor guidelines in place at the time of your procedure. The staff caring for you will stay in close communication with your loved one and keep them updated on progress.  Please provide a phone number for us to use when communicating with your family or ride home.    When you are ready to discharge, we will notify your family/person with you to bring the car to the front entrance. We will take you to them after you receive all of your discharge instructions.

## 2021-07-16 NOTE — PROGRESS NOTES
7/16/21 - . LM concerning  surgery on 7/20/21  - have your covid-19 test performed within 10  days of your procedure, then quarantine yourself until after your procedure. Please call the PAT Nurse.

## 2021-07-18 ENCOUNTER — HOSPITAL ENCOUNTER (OUTPATIENT)
Dept: GENERAL RADIOLOGY | Age: 31
Discharge: HOME | End: 2021-07-18
Payer: COMMERCIAL

## 2021-07-18 ENCOUNTER — ANESTHESIA EVENT (OUTPATIENT)
Dept: ENDOSCOPY | Age: 31
End: 2021-07-18
Payer: COMMERCIAL

## 2021-07-18 ENCOUNTER — HOSPITAL ENCOUNTER (OUTPATIENT)
Age: 31
Discharge: HOME | End: 2021-07-18
Payer: COMMERCIAL

## 2021-07-18 DIAGNOSIS — R06.83 SNORING: ICD-10-CM

## 2021-07-18 DIAGNOSIS — E66.01 MORBID OBESITY WITH BMI OF 40.0-44.9, ADULT (HCC): ICD-10-CM

## 2021-07-18 DIAGNOSIS — G47.10 HYPERSOMNOLENCE: ICD-10-CM

## 2021-07-18 PROCEDURE — 71046 X-RAY EXAM CHEST 2 VIEWS: CPT

## 2021-07-19 ENCOUNTER — INITIAL CONSULT (OUTPATIENT)
Dept: CARDIOLOGY CLINIC | Age: 31
End: 2021-07-19
Payer: COMMERCIAL

## 2021-07-19 VITALS
DIASTOLIC BLOOD PRESSURE: 70 MMHG | HEIGHT: 65 IN | HEART RATE: 80 BPM | SYSTOLIC BLOOD PRESSURE: 122 MMHG | WEIGHT: 250 LBS | BODY MASS INDEX: 41.65 KG/M2

## 2021-07-19 DIAGNOSIS — Z01.818 PRE-OP TESTING: Primary | ICD-10-CM

## 2021-07-19 DIAGNOSIS — E66.01 MORBID OBESITY WITH BMI OF 40.0-44.9, ADULT (HCC): ICD-10-CM

## 2021-07-19 PROCEDURE — G8417 CALC BMI ABV UP PARAM F/U: HCPCS | Performed by: INTERNAL MEDICINE

## 2021-07-19 PROCEDURE — 93000 ELECTROCARDIOGRAM COMPLETE: CPT | Performed by: INTERNAL MEDICINE

## 2021-07-19 PROCEDURE — 99203 OFFICE O/P NEW LOW 30 MIN: CPT | Performed by: INTERNAL MEDICINE

## 2021-07-19 PROCEDURE — G8427 DOCREV CUR MEDS BY ELIG CLIN: HCPCS | Performed by: INTERNAL MEDICINE

## 2021-07-19 NOTE — PROGRESS NOTES
..Surgery 7/20/2021@ 0930 with arrival at 0730               1. Do not eat or drink anything after midnight - unless instructed by your doctor prior to surgery. This includes                   no water, chewing gum or mints. 2. Follow your directions as prescribed by the doctor for your procedure and medications. 3. Check with your Doctor regarding stopping Plavix, Coumadin, Lovenox,Effient,Pradaxa,Xarelto, Fragmin or other blood thinners and                   follow their instructions. 4. Do not smoke, and do not drink any alcoholic beverages 24 hours prior to surgery. This includes NA Beer. 5. You may brush your teeth and gargle the morning of surgery. DO NOT SWALLOW WATER   6. You MUST make arrangements for a responsible adult to take you home after your surgery and be able to check on you every couple                   hours for the day. You will not be allowed to leave alone or drive yourself home. It is strongly suggested someone stay with you the first 24                   hrs. Your surgery will be cancelled if you do not have a ride home. 7. Please wear simple, loose fitting clothing to the hospital.  Sharron Michaels not bring valuables (money, credit cards, checkbooks, etc.) Do not wear any                   makeup (including no eye makeup) or nail polish on your fingers or toes. 8. DO NOT wear any jewelry or piercings on day of surgery. All body piercing jewelry must be removed. 9. If you have dentures, they will be removed before going to the OR; we will provide you a container. If you wear contact lenses or glasses,                  they will be removed; please bring a case for them. 10. If you  have a Living Will and Durable Power of  for Healthcare, please bring in a copy. 11. Please bring picture ID,  insurance card, paperwork from the doctors office    (H & P, Consent, & card for implantable devices).            12. Take a shower the night before or morning of your procedure, do not apply any lotion, oil or powder. 13. Wear a mask covering your nose & mouth when entering the hospital. Have your covid-19 test performed within 10 days of your                  surgery. Quarantine yourself after the test until after your surgery. pt had covid test done 7/13/2021- negative

## 2021-07-19 NOTE — PROGRESS NOTES
CARDIOLOGY CONSULTATION    Nicol Saxena  1990    Dear Dr. Caro Rosario, [facility]    Thank you for asking me to participate in care of Nicol Saxena    Chief Complaint   Patient presents with   [address] for gastric bypass with Dr Matias Felix. Pt denies any chest pain,SOB,dizziness,swelling to ankles, or palpitations. History of present illness:  Nicol Saxena is a 28 y.o. female is seeing me for the first time for cardiac clearance as she is anticipating gastric sleeve surgery for weight loss. She has no known cardiac history. He has chronic obesity has been as heavy as more than 300 pounds and as low as 223 pounds in the last 3 years. She was on keto diet in 2018 when she lost weight and she went through divorce and gained most of it is back. She quit smoking in March of this year. She joined [employer] and exercises regularly goes for 30 minutes on treadmill 4 miles an hour denies any unusual shortness of breath or chest pains. She does do cardio fitness exercises. She is not on any prescription medications. She has opted not to take vaccination for COVID-19. REVIEW OF SYSTEMS:   Constitutional:  Denies generalized weakness  Eyes:  Denies change in visual acuity  HENT:  Denies nasal congestion or sore throat  Respiratory:  Denies cough or shortness of breath  Cardiovascular: as in HPI  GI:  Denies abdominal pain, complains of nausea and vomiting  :  Denies dysuria  Musculoskeletal:  Denies back pain or joint pain  Integument:  Denies rash  Neurologic:  Denies headache, focal weakness or sensory changes  \"Remaining review of systems reviewed and negative. Past medical history:  has a past medical history of Depression, Diabetes mellitus ([address] Group B streptococcal infection during pregnancy,  (normal spontaneous vaginal delivery), Obesity, unspecified, Pancreatitis chronic, and Problems with hearing.   Past surgical history:  has a past surgical history that includes Tubal ligation (2015); Hemorrhoid surgery; and Vagina surgery (). Social History:   Social History     Tobacco Use    Smoking status: Former Smoker     Packs/day: 0.50     Years: 8.00     Pack years: 4.00     Types: Cigarettes     Quit date: 2021     Years since quittin.3    Smokeless tobacco: Never Used    Tobacco comment:  smoking less than a pack a week she stated she should stop   Substance Use Topics    Alcohol use: Yes     Alcohol/week: 0.0 standard drinks     Comment: socially- average \"one time per week lbut none since started getting ready for surgeyr'     Family history: family history includes Breast Cancer in her mother; Cancer in her brother; No Known Problems in her father; Other in her sister; Ovarian Cancer in her mother. She was adopted. Allergies   Allergen Reactions    Wool Alcohol [Lanolin] Hives     \"wool\" ; hives/ blisters     Prior to Admission medications    Not on File     Physical Examination:    Vitals:    21 1358   BP: 122/70   Pulse: 80   Weight: 250 lb (113.4 kg)   Height: 5' 5\" (1.651 m)      Body mass index is 41.6 kg/m². Wt Readings from Last 3 Encounters:   21 250 lb (113.4 kg)   21 250 lb 8 oz (113.6 kg)   21 240 lb (108.9 kg)     Constitutional: This pleasant female in no apparent distress  Eyes: Pupils are equal no conjunctival pallor noted  ENT: She is wearing a facemask  NECK: No JVP or thyromegaly  Cardiovascular: Auscultation: Normal S1 and S2. No significant murmurs or gallops noted. Carotids are negative for bruits. Abdominal aorta is nonpalpable. No epigastric bruit noted. Peripheral pulses: Pedal pulses are 2+ equal in both feet  Respiratory:  Respiratory effort is normal.  Breath sounds are clear to auscultation  Extremities:  No edema, clubbing,  Cyanosis, petechiae. SKIN: Warm and well perfused, no pallor or cyanosis. she has multiple tattoos  Abdomen:  No masses or tenderness.  No organomegaly noted.  Neurologic:  Oriented to time, place, and person and non-anxious. No focal neurological deficit noted. Psychiatric: Normal mood and effect. LAB REVIEW:  CBC:   Lab Results   Component Value Date    WBC 11.7 11/16/2020    HGB 13.6 11/16/2020    HCT 42.5 11/16/2020     11/16/2020     Lipids:   Lab Results   Component Value Date    CHOL 177 04/22/2013    TRIG 210 (H) 04/22/2013    HDL 30 (L) 04/22/2013     Renal:   Lab Results   Component Value Date    BUN 10 11/16/2020    CREATININE 0.8 11/16/2020     11/16/2020    K 4.0 11/16/2020     PT/INR:   Lab Results   Component Value Date    INR 0.97 11/08/2017     Lab Results   Component Value Date    LABA1C 6.3 03/08/2021     EKG:  Sinus  Rhythm beats per minute with abnormal precordial QRS contours -nondiagnostic for this age   possible septal Q-waves    Assessment / Recommendations:    Pre-op testing  Patient is functional class I very active and has no cardiac symptoms. He is considered low cardiac risk from bariatric procedure. No further cardiac work-up is recommended at this point. Morbid obesity with BMI of 40.0-44.9, adult ([address]  Continue follow-up with weight management program.     Multiple questions answered. Patient verbalizes understanding and asks relevant questions. Follow up with PCP and see me as needed. Quiana Almazan MD, 7/19/2021 2:47 PM     Please note this report has been produced using speech recognition software and may contain errors related to that system including errors in grammar, punctuation, and spelling, as well as words and phrases that may be inappropriate.  If there are any questions or concerns please feel free to contact the dictating provider for clarification

## 2021-07-19 NOTE — ASSESSMENT & PLAN NOTE
Patient is functional class I very active and has no cardiac symptoms. He is considered low cardiac risk from bariatric procedure. No further cardiac work-up is recommended at this point.

## 2021-07-19 NOTE — PROGRESS NOTES
Called to notify the patient procedure 7/20/2021@ 0930 with arrival at 0730- verbalized  understanding

## 2021-07-19 NOTE — ANESTHESIA PRE PROCEDURE
Department of Anesthesiology  Preprocedure Note       Name:  Coral Arias   Age:  28 y.o.  :  1990                                          MRN:  [id number]         Date:  2021      Surgeon: Mitali Payor):  Dottie Carrasco MD    Procedure: Procedure(s):  EGD BIOPSY    Medications prior to admission:   Prior to Admission medications    Not on File       Current medications:    No current facility-administered medications for this encounter. No current outpatient medications on file. Allergies: Allergies   Allergen Reactions    Wool Alcohol [Lanolin] Hives     \"wool\" ; hives/ blisters       Problem List:    Patient Active Problem List   Diagnosis Code    Weight gain R63.5    Pre-diabetes R73.03    Lesion of buccal mucosa K13.70    Tobacco use Z72.0    Obesity E66.9    Moderate episode of recurrent major depressive disorder (HCC) F33.1    Anxiety F41.9    Positive depression screening Z13.31    Generalized body aches R52    Viral URI J06.9    Morbid obesity with BMI of 40.0-44.9, adult (HCC) E66.01, Z68.41    Complex third degree hemorrhoid K64.2       Past Medical History:        Diagnosis Date    Depression     Diabetes mellitus ([facility] [address]     type 2-diet controlled.  Group B streptococcal infection during pregnancy      (normal spontaneous vaginal delivery) 7-2-15    delivered in ambulance 12 minutes    Obesity, unspecified     Pancreatitis chronic     Has been on medication for diabetes, but changed to diet a few months ago.     Problems with hearing     dr Pa Amor; only due to when she had a cold       Past Surgical History:        Procedure Laterality Date    HEMORRHOID SURGERY      x3    TUBAL LIGATION  2015    VAGINA SURGERY      reconstruction between vag/anal area from childbirth       Social History:    Social History     Tobacco Use    Smoking status: Former Smoker     Packs/day: 0.50     Years: 8.00     Pack years: 4.00     Types: Cigarettes     Quit Type & Screen (If Applicable):  No results found for: LABABO, LABRH    Drug/Infectious Status (If Applicable):  No results found for: HIV, HEPCAB    COVID-19 Screening (If Applicable):   Lab Results   Component Value Date    COVID19 NOT DETECTED 2021           Anesthesia Evaluation  Patient summary reviewed no history of anesthetic complications:   Airway: Mallampati: I  TM distance: >3 FB   Neck ROM: full  Mouth opening: > = 3 FB Dental: normal exam         Pulmonary:normal exam                              ROS comment: Former Smoker - 4 pack years  Quit Smokin21       Cardiovascular:Negative CV ROS  Exercise tolerance: good (>4 METS),            Beta Blocker:  Not on Beta Blocker      ROS comment: Cardiac clearance 2021:  Pre-op testing  Patient is functional class I very active and has no cardiac symptoms. He is considered low cardiac risk from bariatric procedure. No further cardiac work-up is recommended at this point. EKG:  Sinus  Rhythm beats per minute with abnormal precordial QRS contours -nondiagnostic for this age   possible septal Q-waves     Neuro/Psych:   (+) psychiatric history:depression/anxiety             GI/Hepatic/Renal:   (+) morbid obesity          Endo/Other:    (+) DiabetesType II DM, , .                 Abdominal:   (+) obese,           Vascular: negative vascular ROS. Other Findings:           Anesthesia Plan      MAC     ASA 2       Induction: intravenous. Anesthetic plan and risks discussed with patient. CARLEE Roberson CRNA   2021        Pre Anesthesia Evaluation complete. Anesthesia plan, risks, benefits, alternatives, and personnel discussed with patient and/or legal guardian. Patient and/or legal guardian verbalized an understanding and agreed to proceed. Anesthesia plan discussed with care team members and agreed upon.   CARLEE Roberson CRNA  2021

## 2021-07-20 ENCOUNTER — HOSPITAL ENCOUNTER (OUTPATIENT)
Age: 31
Setting detail: OUTPATIENT SURGERY
Discharge: HOME | End: 2021-07-20
Attending: SURGERY | Admitting: SURGERY
Payer: COMMERCIAL

## 2021-07-20 ENCOUNTER — ANESTHESIA (OUTPATIENT)
Dept: ENDOSCOPY | Age: 31
End: 2021-07-20
Payer: COMMERCIAL

## 2021-07-20 VITALS
RESPIRATION RATE: 16 BRPM | TEMPERATURE: 97.5 F | OXYGEN SATURATION: 100 % | WEIGHT: 250 LBS | BODY MASS INDEX: 41.65 KG/M2 | HEIGHT: 65 IN | HEART RATE: 53 BPM | DIASTOLIC BLOOD PRESSURE: 76 MMHG | SYSTOLIC BLOOD PRESSURE: 111 MMHG

## 2021-07-20 VITALS — SYSTOLIC BLOOD PRESSURE: 104 MMHG | OXYGEN SATURATION: 100 % | DIASTOLIC BLOOD PRESSURE: 60 MMHG

## 2021-07-20 LAB — PREGNANCY TEST URINE, POC: NEGATIVE

## 2021-07-20 PROCEDURE — 2580000003 HC RX 258: Performed by: SURGERY

## 2021-07-20 PROCEDURE — 88305 TISSUE EXAM BY PATHOLOGIST: CPT | Performed by: PATHOLOGY

## 2021-07-20 PROCEDURE — 87077 CULTURE AEROBIC IDENTIFY: CPT

## 2021-07-20 PROCEDURE — 3609012400 HC EGD TRANSORAL BIOPSY SINGLE/MULTIPLE: Performed by: SURGERY

## 2021-07-20 PROCEDURE — 7100000011 HC PHASE II RECOVERY - ADDTL 15 MIN: Performed by: SURGERY

## 2021-07-20 PROCEDURE — 81025 URINE PREGNANCY TEST: CPT

## 2021-07-20 PROCEDURE — 7100000010 HC PHASE II RECOVERY - FIRST 15 MIN: Performed by: SURGERY

## 2021-07-20 PROCEDURE — 43239 EGD BIOPSY SINGLE/MULTIPLE: CPT | Performed by: SURGERY

## 2021-07-20 PROCEDURE — 2500000003 HC RX 250 WO HCPCS: Performed by: NURSE ANESTHETIST, CERTIFIED REGISTERED

## 2021-07-20 PROCEDURE — 6360000002 HC RX W HCPCS: Performed by: NURSE ANESTHETIST, CERTIFIED REGISTERED

## 2021-07-20 PROCEDURE — 2709999900 HC NON-CHARGEABLE SUPPLY: Performed by: SURGERY

## 2021-07-20 PROCEDURE — 3700000001 HC ADD 15 MINUTES (ANESTHESIA): Performed by: SURGERY

## 2021-07-20 PROCEDURE — 3700000000 HC ANESTHESIA ATTENDED CARE: Performed by: SURGERY

## 2021-07-20 PROCEDURE — 88342 IMHCHEM/IMCYTCHM 1ST ANTB: CPT | Performed by: PATHOLOGY

## 2021-07-20 RX ORDER — PROPOFOL 10 MG/ML
INJECTION, EMULSION INTRAVENOUS PRN
Status: DISCONTINUED | OUTPATIENT
Start: 2021-07-20 | End: 2021-07-20 | Stop reason: SDUPTHER

## 2021-07-20 RX ORDER — PANTOPRAZOLE SODIUM 40 MG/1
40 TABLET, DELAYED RELEASE ORAL 2 TIMES DAILY
Qty: 60 TABLET | Refills: 3 | Status: SHIPPED | OUTPATIENT
Start: 2021-07-20 | End: 2021-10-25

## 2021-07-20 RX ORDER — LIDOCAINE HYDROCHLORIDE 20 MG/ML
INJECTION, SOLUTION EPIDURAL; INFILTRATION; INTRACAUDAL; PERINEURAL PRN
Status: DISCONTINUED | OUTPATIENT
Start: 2021-07-20 | End: 2021-07-20 | Stop reason: SDUPTHER

## 2021-07-20 RX ORDER — SODIUM CHLORIDE, SODIUM LACTATE, POTASSIUM CHLORIDE, CALCIUM CHLORIDE 600; 310; 30; 20 MG/100ML; MG/100ML; MG/100ML; MG/100ML
INJECTION, SOLUTION INTRAVENOUS CONTINUOUS
Status: DISCONTINUED | OUTPATIENT
Start: 2021-07-20 | End: 2021-07-20 | Stop reason: HOSPADM

## 2021-07-20 RX ADMIN — SODIUM CHLORIDE, POTASSIUM CHLORIDE, SODIUM LACTATE AND CALCIUM CHLORIDE: 600; 310; 30; 20 INJECTION, SOLUTION INTRAVENOUS at 08:26

## 2021-07-20 RX ADMIN — LIDOCAINE HYDROCHLORIDE 100 MG: 20 INJECTION, SOLUTION EPIDURAL; INFILTRATION; INTRACAUDAL; PERINEURAL at 10:07

## 2021-07-20 RX ADMIN — PROPOFOL 450 MG: 10 INJECTION, EMULSION INTRAVENOUS at 10:07

## 2021-07-20 ASSESSMENT — PAIN SCALES - GENERAL
PAINLEVEL_OUTOF10: 0
PAINLEVEL_OUTOF10: 0

## 2021-07-20 NOTE — H&P
trouble swallowing and voice change. Respiratory: Negative for cough, positive for shortness of breath no wheezing. Cardiovascular: Negative for chest pain positive for SOB with one flight of stairs exertion, no pitting LE edema. Gastrointestinal: Negative for nausea, vomiting, diarrhea, constipation, blood in stool, abdominal distention, anal bleeding or rectal pain. Musculoskeletal: Negative for joint swelling and arthralgias. Skin: Warm and dry, well perfused. Neurological: Negative for seizures, syncope, speech difficulty and weakness. Hematological/Lymphatic: Negative for adenopathy. No history of DVT/PE. Does not bruise/bleed easily. Psychiatric/Behavioral: Negative for agitation. Physical Exam:  Vital Signs:   Vitals:    07/20/21 0757   BP: 120/70   Pulse: 55   Resp: 22   Temp: 96.1 °F (35.6 °C)   SpO2: 96%     General appearance: Pt Alert and oriented, in no apparent acute distress. HEENT:  NENA, EOMI, No JVDs, Bruits, Megalies. Lungs: Clear to auscultation bilaterally. Heart: Regular rate and rhythm, S1, S2 normal, no murmur, rub or gallop. Abdomen: Non tender. Non distended. Positive bowel sounds. No hernias noted, no masses palpable. Extremities: Warm, well perfused, no cyanosis or edema. Skin: Skin color, texture, turgor normal.  Neurologic: Grossly normal, Cranial nerves from II to XII intact. Radiologic / Imaging / TESTING  XR CHEST (2 VW)    Result Date: 7/18/2021  EXAMINATION: TWO XRAY VIEWS OF THE CHEST 7/18/2021 10:50 am COMPARISON: 05/02/2018 HISTORY: ORDERING SYSTEM PROVIDED HISTORY: Morbid obesity with BMI of 40.0-44.9, adult [facility]) TECHNOLOGIST PROVIDED HISTORY: Reason for Exam: Morbid obesity with BMI of 40.0-44.9, adult Acuity: Acute Type of Exam: Initial FINDINGS: Cardiomediastinal silhouette is normal in size. There is no pulmonary consolidation, pleural effusion, or pneumothorax. There is no acute osseous abnormality.      No acute cardiopulmonary abnormality. Labs:    Recent Results (from the past 24 hour(s))   POC Pregnancy Urine Qual    Collection Time: 07/20/21  7:57 AM   Result Value Ref Range    Preg Test, Ur NEGATIVE NEGATIVE         Diagnosis:  Patient Active Problem List   Diagnosis    Weight gain    Pre-diabetes    Lesion of buccal mucosa    Tobacco use    Obesity    Moderate episode of recurrent major depressive disorder (HCC)    Anxiety    Positive depression screening    Generalized body aches    Viral URI    Morbid obesity with BMI of 40.0-44.9, adult (HCC)    Complex third degree hemorrhoid    Pre-op testing           Assessment & Plan:    Savanna Gunn is a very pleasant 28 y.o. female who presents today for her preop EGD eval before her bariatric procedure. As noted her Body mass index is 41.6 kg/m². with significant co-morbidities as below. The patient has been complying with the pre-operative workup, lifestyle modifications and changes with the bariatric clinic, including:  Dietitian evaluation and counseling, psychologist evaluation and counseling, Exercise physiologist evaluation and counseling, cardiac preoperative clearance and optimization, pulmonology preoperative clearance and optimization, today will proceed with preoperative EGD for GERD, morbid obesity: Body mass index is 41.6 kg/m². , in preparation for a bariatric procedure; to r/o GERD, Hiatal Hernia, Peptic Ulcer Disease, Gastroparesis, Esophageal dysmotility; etc.    All risks and benefits, potential complications and possible alternatives discussed, and agreeable to proceed.     ____________________________________________    Mellisa Jimenes MD, FACS, FICS    7/20/2021  10:01 AM

## 2021-07-20 NOTE — PROGRESS NOTES
1031  Pt back to Same Day from Endo per cart. Pt is awake and alert--skin W&D. Report received from [facility] VS rechecked and stable--set to cycle q15 min. Pt denies any pain or nausea--given water to drink.  to bedside. 1050  Drinking water w/o nausea. VS stable. 1110 Pt ready to go home. Pt and  instructed for discharge care and follow-up and use of Rx with understanding voiced. 1117 IV dc'd and pt up in room to get dressed. 1125 Pt out to car at exit per wheelchair by volunteer.

## 2021-07-20 NOTE — ANESTHESIA POSTPROCEDURE EVALUATION
Department of Anesthesiology  Postprocedure Note    Patient: Rafa Barnett  MRN: [id number]  YOB: 1990  Date of evaluation: 7/20/2021  Time:  10:26 AM     Procedure Summary     Date: 07/20/21 Room / Location: [address]    Anesthesia Start: 1003 Anesthesia Stop: 1026    Procedure: EGD BIOPSY (N/A ) Diagnosis: (MORBID OBESITY)    Surgeons: Aaliyah Robledo MD Responsible Provider: Bernarda Seo MD    Anesthesia Type: MAC ASA Status: 2          Anesthesia Type: MAC    Dejah Phase I:  10    Dejah Phase II:  10    Last vitals: Reviewed and per EMR flowsheets.        Anesthesia Post Evaluation    Patient location during evaluation: bedside  Patient participation: complete - patient participated  Level of consciousness: awake and alert  Pain score: 0  Airway patency: patent  Nausea & Vomiting: no nausea and no vomiting  Complications: no  Cardiovascular status: hemodynamically stable  Respiratory status: acceptable, room air, spontaneous ventilation and nonlabored ventilation  Hydration status: euvolemic

## 2021-07-21 ENCOUNTER — OFFICE VISIT (OUTPATIENT)
Dept: BARIATRICS/WEIGHT MGMT | Age: 31
End: 2021-07-21

## 2021-07-21 VITALS
DIASTOLIC BLOOD PRESSURE: 70 MMHG | WEIGHT: 255.1 LBS | OXYGEN SATURATION: 96 % | SYSTOLIC BLOOD PRESSURE: 112 MMHG | BODY MASS INDEX: 42.5 KG/M2 | TEMPERATURE: 97.4 F | HEIGHT: 65 IN | HEART RATE: 76 BPM

## 2021-07-21 DIAGNOSIS — E66.01 MORBID OBESITY WITH BMI OF 40.0-44.9, ADULT (HCC): Primary | ICD-10-CM

## 2021-07-21 LAB — CLOTEST: NEGATIVE

## 2021-07-21 PROCEDURE — 1036F TOBACCO NON-USER: CPT | Performed by: SURGERY

## 2021-07-21 PROCEDURE — 99213 OFFICE O/P EST LOW 20 MIN: CPT | Performed by: SURGERY

## 2021-07-21 PROCEDURE — G8417 CALC BMI ABV UP PARAM F/U: HCPCS | Performed by: SURGERY

## 2021-07-21 PROCEDURE — G8427 DOCREV CUR MEDS BY ELIG CLIN: HCPCS | Performed by: SURGERY

## 2021-07-21 RX ORDER — OMEPRAZOLE 40 MG/1
40 CAPSULE, DELAYED RELEASE ORAL
Qty: 30 CAPSULE | Refills: 2 | Status: SHIPPED | OUTPATIENT
Start: 2021-07-21

## 2021-07-21 ASSESSMENT — ENCOUNTER SYMPTOMS
VOICE CHANGE: 0
SHORTNESS OF BREATH: 1
BACK PAIN: 1
VOMITING: 0
COUGH: 0
ANAL BLEEDING: 0
SORE THROAT: 0
DIARRHEA: 0
NAUSEA: 0
WHEEZING: 0
BLOOD IN STOOL: 0
PHOTOPHOBIA: 0
TROUBLE SWALLOWING: 0
COLOR CHANGE: 0
ABDOMINAL PAIN: 1
ABDOMINAL DISTENTION: 1
CONSTIPATION: 0

## 2021-07-21 NOTE — PROGRESS NOTES
Systems   Constitutional: Positive for fatigue. Negative for activity change, chills, diaphoresis and fever. HENT: Negative for sore throat, trouble swallowing and voice change. Eyes: Negative for photophobia and visual disturbance. Respiratory: Positive for shortness of breath. Negative for cough and wheezing. Cardiovascular: Positive for leg swelling. Negative for chest pain and palpitations. Gastrointestinal: Positive for abdominal distention and abdominal pain. Negative for anal bleeding, blood in stool, constipation, diarrhea, nausea and vomiting. Endocrine: Positive for polyphagia. Negative for cold intolerance, heat intolerance, polydipsia and polyuria. Genitourinary: Positive for urgency. Negative for dysuria, frequency and hematuria. Musculoskeletal: Positive for arthralgias, back pain and gait problem. Negative for joint swelling, myalgias and neck stiffness. Skin: Negative for color change and rash. Neurological: Negative for seizures, speech difficulty, light-headedness and numbness. Hematological: Negative for adenopathy. Does not bruise/bleed easily. Psychiatric/Behavioral: Positive for sleep disturbance. The patient is nervous/anxious. OBJECTIVE:    Vitals:    07/21/21 0859   BP: 112/70   Pulse: 76   Temp: 97.4 °F (36.3 °C)   SpO2: 96%     Body mass index is 42.45 kg/m². Physical Exam  Vitals reviewed. Constitutional:       General: She is not in acute distress. Appearance: She is well-developed. She is not diaphoretic. HENT:      Head: Normocephalic and atraumatic. Eyes:      General: No scleral icterus. Conjunctiva/sclera: Conjunctivae normal.      Pupils: Pupils are equal, round, and reactive to light. Neck:      Thyroid: No thyromegaly. Vascular: No JVD. Trachea: No tracheal deviation. Cardiovascular:      Rate and Rhythm: Normal rate and regular rhythm. Heart sounds: Normal heart sounds. No murmur heard. No friction rub.  No

## 2021-08-02 ENCOUNTER — HOSPITAL ENCOUNTER (EMERGENCY)
Age: 31
Discharge: HOME | End: 2021-08-02
Payer: COMMERCIAL

## 2021-08-02 VITALS
HEIGHT: 65 IN | SYSTOLIC BLOOD PRESSURE: 124 MMHG | DIASTOLIC BLOOD PRESSURE: 95 MMHG | HEART RATE: 78 BPM | RESPIRATION RATE: 18 BRPM | BODY MASS INDEX: 41.65 KG/M2 | OXYGEN SATURATION: 100 % | TEMPERATURE: 98.9 F | WEIGHT: 250 LBS

## 2021-08-02 DIAGNOSIS — S81.812A LACERATION OF LEFT LOWER EXTREMITY, INITIAL ENCOUNTER: Primary | ICD-10-CM

## 2021-08-02 PROCEDURE — 90471 IMMUNIZATION ADMIN: CPT | Performed by: PHYSICIAN ASSISTANT

## 2021-08-02 PROCEDURE — 99282 EMERGENCY DEPT VISIT SF MDM: CPT

## 2021-08-02 PROCEDURE — 90715 TDAP VACCINE 7 YRS/> IM: CPT | Performed by: PHYSICIAN ASSISTANT

## 2021-08-02 PROCEDURE — 6360000002 HC RX W HCPCS: Performed by: PHYSICIAN ASSISTANT

## 2021-08-02 PROCEDURE — 12001 RPR S/N/AX/GEN/TRNK 2.5CM/<: CPT

## 2021-08-02 PROCEDURE — 2500000003 HC RX 250 WO HCPCS: Performed by: PHYSICIAN ASSISTANT

## 2021-08-02 RX ORDER — LIDOCAINE HYDROCHLORIDE AND EPINEPHRINE BITARTRATE 20; .01 MG/ML; MG/ML
20 INJECTION, SOLUTION SUBCUTANEOUS ONCE
Status: COMPLETED | OUTPATIENT
Start: 2021-08-02 | End: 2021-08-02

## 2021-08-02 RX ADMIN — TETANUS TOXOID, REDUCED DIPHTHERIA TOXOID AND ACELLULAR PERTUSSIS VACCINE, ADSORBED 0.5 ML: 5; 2.5; 8; 8; 2.5 SUSPENSION INTRAMUSCULAR at 18:34

## 2021-08-02 RX ADMIN — LIDOCAINE HYDROCHLORIDE,EPINEPHRINE BITARTRATE 20 ML: 20; .01 INJECTION, SOLUTION INFILTRATION; PERINEURAL at 18:33

## 2021-08-02 ASSESSMENT — PAIN SCALES - GENERAL: PAINLEVEL_OUTOF10: 2

## 2021-08-02 NOTE — ED PROVIDER NOTES
Triage Chief Complaint:   Laceration (left knee, told by urgent care to come to ed because there is glass in it)    [place]:  Marge Hernandez is a 28 y.o. female that presents today for laceration. Context is patient slipped and fell through window. Causing a laceration to her left knee. She also has glass stuck in her left second toe    Onset was just prior to arrival  No gross blood loss. No loss of consciousness no confusion or dizziness no lightheadedness no headache. ROS:  At least  0  systems reviewed and otherwise negative except as in the [address] Past Medical History:   Diagnosis Date    Depression     Diabetes mellitus ([facility] [address]     type 2-diet controlled. \"had gestational diabetes and my hbgA1c was 6.1- off medications for the past 2 yrs now just diet controlled    Group B streptococcal infection during pregnancy      (normal spontaneous vaginal delivery) 7-15    delivered in ambulance 12 minutes    Obesity, unspecified     Pancreatitis chronic     Problems with hearing     dr Jessa Ramirez; only due to when she had a cold     Past Surgical History:   Procedure Laterality Date    HEMORRHOID SURGERY      x3- last time 2019    TUBAL LIGATION  2015    UPPER GASTROINTESTINAL ENDOSCOPY N/A 2021    EGD BIOPSY performed by Aman Bradford MD at [address]      reconstruction between vag/anal area from childbirth     Family History   Adopted: Yes   Problem Relation Age of Onset    Breast Cancer Mother     Ovarian Cancer Mother     No Known Problems Father     Other Sister         autistic (1 sister)    Cancer Brother         eye, testicular     Social History     Socioeconomic History    Marital status:      Spouse name: Not on file    Number of children: Not on file    Years of education: Not on file    Highest education level: Not on file   Occupational History    Not on file   Tobacco Use    Smoking status: Former Smoker     Packs/day: 0.50 Years: 8.00     Pack years: 4.00     Types: Cigarettes     Quit date: 2021     Years since quittin.4    Smokeless tobacco: Never Used    Tobacco comment:  smoking less than a pack a week she stated she should stop   Vaping Use    Vaping Use: Former    Substances: Nicotine (only used 1-2 times; then quit)   Substance and Sexual Activity    Alcohol use: Yes     Alcohol/week: 0.0 standard drinks     Comment: socially- average \"one time per week lbut none since started getting ready for surgeyr'    Drug use: Never    Sexual activity: Yes     Partners: Male   Other Topics Concern    Not on file   Social History Narrative    Not on file     Social Determinants of Health     Financial Resource Strain:     Difficulty of Paying Living Expenses:    Food Insecurity:     Worried About Running Out of Food in the Last Year:     [address] in the Last Year:    Transportation Needs:     Lack of Transportation (Medical):  Lack of Transportation (Non-Medical):    Physical Activity:     Days of Exercise per Week:     Minutes of Exercise per Session:    Stress:     Feeling of Stress :    Social Connections:     Frequency of Communication with Friends and Family:     Frequency of Social Gatherings with Friends and Family:     Attends Mandaeism Services:     Active Member of Clubs or Organizations:     Attends Club or Organization Meetings:     Marital Status:    Intimate Partner Violence:     Fear of Current or Ex-Partner:     Emotionally Abused:     Physically Abused:     Sexually Abused:      No current facility-administered medications for this encounter.      Current Outpatient Medications   Medication Sig Dispense Refill    omeprazole (PRILOSEC) 40 MG delayed release capsule Take 1 capsule by mouth every morning (before breakfast) 30 capsule 2    pantoprazole (PROTONIX) 40 MG tablet Take 1 tablet by mouth 2 times daily (Patient not taking: Reported on 2021) 60 tablet 3 Allergies   Allergen Reactions    Wool Alcohol [Lanolin] Hives     \"wool\" ; hives/ blisters       Nursing Notes Reviewed    Physical Exam:  ED Triage Vitals   Enc Vitals Group      BP 08/02/21 1812 (!) 124/95      Pulse 08/02/21 1820 78      Resp 08/02/21 1821 18      Temp 08/02/21 1823 98.9 °F (37.2 °C)      Temp Source 08/02/21 1823 Oral      SpO2 08/02/21 1812 100 %      Weight 08/02/21 1821 250 lb (113.4 kg)      Height 08/02/21 1821 5' 5\" (1.651 m)      Head Circumference --       Peak Flow --       Pain Score --       Pain Loc --       Pain Edu? --       Excl. in [address]? --      GENERAL APPEARANCE: Awake and alert. Cooperative. No acute distress. HEAD: Normocephalic. Atraumatic. EYES: EOM's grossly intact. Sclera anicteric. PERRLA. Conjunctiva non injected. No discharge  ENT: Mucous membranes are moist. No trismus. Posterior Pharynx non injected, no tongue swelling, airway patent, no tonsillar edema. No exudates noted. No abscess. No discharge. Middle ear spaces clear. Tympanic membrane non injected. Auditory canal clear. ABDOMEN: Soft. Non-tender. No guarding or rebound. No organomegaly. No palpable masses  MUSCULOSKELETAL: No acute deformities. SKIN: Warm and dry. No rash, No erythema, No edema. No ecchymoses. Laceration:   1 patient's anterior left knee there is a 2 cm horizontal nongaping laceration noted. No obvious foreign bodies no active bleeding. No bony exposure. No arterial bleeding. NEUROLOGICAL: No gross facial drooping. Moves all 4 extremities spontaneously. PSYCHIATRIC: Normal mood. Procedure Note - Laceration repair:  Questions were sought and answered and verbal consent was given by patient for the procedure. The area was prepped and draped in standard bedside fashion. The wound area was anesthetized with 2ml of Lidocaine 2% with epinephrine without added sodium bicarbonate. The wound was explored with No foreign bodies found.  The wound was repaired with 4-0 Prolene; 5 simple external sutures were used. The patient tolerated the procedure well without complications and my repeat neurovascular exam post-procedure is unchanged. I have reviewed and interpreted all of the currently available lab results from this visit (if applicable):  No results found for this visit on 08/02/21. Radiographs (if obtained):  [] The following radiograph was interpreted by myself in the absence of a radiologist:   [] Radiologist's Report Reviewed:  No orders to display       EKG (if obtained):   Please See Note of attending physician for EKG interpretation. Chart review shows recent radiograph(s):  XR CHEST (2 VW)    Result Date: 7/18/2021  EXAMINATION: TWO XRAY VIEWS OF THE CHEST 7/18/2021 10:50 am COMPARISON: 05/02/2018 HISTORY: ORDERING SYSTEM PROVIDED HISTORY: Morbid obesity with BMI of 40.0-44.9, adult [facility]) TECHNOLOGIST PROVIDED HISTORY: Reason for Exam: Morbid obesity with BMI of 40.0-44.9, adult Acuity: Acute Type of Exam: Initial FINDINGS: Cardiomediastinal silhouette is normal in size. There is no pulmonary consolidation, pleural effusion, or pneumothorax. There is no acute osseous abnormality. No acute cardiopulmonary abnormality. MDM:   Patient presents today with laceration. Laceration repair performed by myself without complications. Patient did tolerate procedure well. Wound care discussed with patient/family. As well as return precautions, suture staple/removal.    Wound care and scar minimization education was provided. Instructions were given to return for increasing pain, redness, streaking, discharge, or any other worsening or worrisome concerns. I'm very pleased with the results of the wound repair. Pt is to be discharged home. Pt is  to return immediately to the emergency department if he has any new, worrisome or worsening symptoms. Pt is to follow up with PCP/DOD within 2 days.   Patient/Surrogate vocalizes agreement and understanding with this plan and he has no questions upon disposition. Pt is comfortable upon disposition home. Patient is stable, Patients vital signs are stable. Vital signs and nursing notes reviewed during ED course. I independently managed patient today in the ED. All pertinent Lab data and radiographic results reviewed with patient at bedside. The patient and/or the family were informed of the results of any tests/labs/imaging, the treatment plan, and time was allotted to answer questions. See chart for details of medications given during the ED stay. BP (!) 124/95   Pulse 78   Temp 98.9 °F (37.2 °C) (Oral)   Resp 18   Ht 5' 5\" (1.651 m)   Wt 250 lb (113.4 kg)   LMP 07/05/2021   SpO2 100%   BMI 41.60 kg/m²       Clinical Impression:  1. Laceration of left lower extremity, initial encounter        Disposition referral (if applicable):  Rosalie Arzola PA-C  [facility] [phone]    In 2 days  For wound re-check    Disposition medications (if applicable):  New Prescriptions    No medications on file         Comment: Please note this report has been produced using speech recognition software and may contain errors related to that system including errors in grammar, punctuation, and spelling, as well as words and phrases that may be inappropriate. If there are any questions or concerns please feel free to contact the dictating provider for clarification.       Tiara Gibbs, [address] [place] [address]  08/02/21 0846

## 2021-08-18 ENCOUNTER — OFFICE VISIT (OUTPATIENT)
Dept: BARIATRICS/WEIGHT MGMT | Age: 31
End: 2021-08-18
Payer: COMMERCIAL

## 2021-08-18 VITALS
HEART RATE: 80 BPM | SYSTOLIC BLOOD PRESSURE: 124 MMHG | HEIGHT: 65 IN | WEIGHT: 249.3 LBS | BODY MASS INDEX: 41.54 KG/M2 | DIASTOLIC BLOOD PRESSURE: 82 MMHG

## 2021-08-18 DIAGNOSIS — E66.01 MORBID OBESITY WITH BMI OF 40.0-44.9, ADULT (HCC): Primary | ICD-10-CM

## 2021-08-18 PROBLEM — Z01.818 PRE-OP TESTING: Status: RESOLVED | Noted: 2021-07-19 | Resolved: 2021-08-18

## 2021-08-18 PROCEDURE — 99214 OFFICE O/P EST MOD 30 MIN: CPT | Performed by: SURGERY

## 2021-08-18 ASSESSMENT — ENCOUNTER SYMPTOMS
ABDOMINAL DISTENTION: 1
CONSTIPATION: 0
DIARRHEA: 0
PHOTOPHOBIA: 0
VOMITING: 0
COUGH: 0
SORE THROAT: 0
TROUBLE SWALLOWING: 0
WHEEZING: 0
BLOOD IN STOOL: 0
NAUSEA: 0
ANAL BLEEDING: 0
ABDOMINAL PAIN: 1
COLOR CHANGE: 0
BACK PAIN: 1
VOICE CHANGE: 0
SHORTNESS OF BREATH: 1

## 2021-08-18 NOTE — PROGRESS NOTES
hives/ blisters           Review of Systems   Constitutional: Positive for fatigue. Negative for activity change, chills, diaphoresis and fever. HENT: Negative for sore throat, trouble swallowing and voice change. Eyes: Negative for photophobia and visual disturbance. Respiratory: Positive for shortness of breath. Negative for cough and wheezing. Cardiovascular: Positive for leg swelling. Negative for chest pain and palpitations. Gastrointestinal: Positive for abdominal distention and abdominal pain. Negative for anal bleeding, blood in stool, constipation, diarrhea, nausea and vomiting. Endocrine: Positive for polyphagia. Negative for cold intolerance, heat intolerance, polydipsia and polyuria. Genitourinary: Positive for urgency. Negative for dysuria, frequency and hematuria. Musculoskeletal: Positive for arthralgias, back pain and gait problem. Negative for joint swelling, myalgias and neck stiffness. Skin: Negative for color change and rash. Neurological: Negative for seizures, speech difficulty, light-headedness and numbness. Hematological: Negative for adenopathy. Does not bruise/bleed easily. Psychiatric/Behavioral: Positive for sleep disturbance. The patient is nervous/anxious. OBJECTIVE:    Vitals:    08/18/21 1050   BP: 124/82   Pulse: 80     Body mass index is 41.49 kg/m². Physical Exam  Vitals reviewed. Constitutional:       General: She is not in acute distress. Appearance: She is well-developed. She is not diaphoretic. HENT:      Head: Normocephalic and atraumatic. Eyes:      General: No scleral icterus. Conjunctiva/sclera: Conjunctivae normal.      Pupils: Pupils are equal, round, and reactive to light. Neck:      Thyroid: No thyromegaly. Vascular: No JVD. Trachea: No tracheal deviation. Cardiovascular:      Rate and Rhythm: Normal rate and regular rhythm. Heart sounds: Normal heart sounds. No murmur heard. No friction rub.  No gallop. Pulmonary:      Effort: Pulmonary effort is normal. No respiratory distress. Breath sounds: No stridor. No wheezing or rales. Chest:      Chest wall: No tenderness. Abdominal:      General: Bowel sounds are normal. There is no distension. Palpations: Abdomen is soft. There is no mass. Tenderness: There is no abdominal tenderness. There is no guarding or rebound. Musculoskeletal:         General: No tenderness. Normal range of motion. Cervical back: Normal range of motion. Lymphadenopathy:      Cervical: No cervical adenopathy. Skin:     General: Skin is warm and dry. Coloration: Skin is not pale. Findings: No erythema or rash. Neurological:      Mental Status: She is alert and oriented to person, place, and time. Cranial Nerves: No cranial nerve deficit. Coordination: Coordination normal.   Psychiatric:         Behavior: Behavior normal.         Thought Content: Thought content normal.         Judgment: Judgment normal.                 ASSESSMENT & PLAN:    1. Morbid obesity with BMI of 40.0-44.9, adult [facility])         Doing well and consented today! Patient was encouraged to journal all food intake. Keep calorie level atapproximately 4317-6070. Protein intake is to be a minimum of 60-80 grams per day. Water drinking was encouraged with a goal of 64oz-128oz daily. Beverages to be calorie free except for milk. Every other beverage should bewater. They are to avoid soda. Continue to increase level of physical activity. I counseled the patient regarding diet and exercise, in preparation for her planned Robotic Sleeve Gastrectomy.     Counting calories, complying with the dietitian's recommendations, and complying with the preoperative workup including the dietitian counseling, exercise physiologist counseling,cardiologist evaluation and pre-operative optimization, pulmonologist evaluation and pre operative optimization, pre operative EGD

## 2021-09-01 ENCOUNTER — TELEPHONE (OUTPATIENT)
Dept: BARIATRICS/WEIGHT MGMT | Age: 31
End: 2021-09-01

## 2021-09-01 NOTE — TELEPHONE ENCOUNTER
Called patient to check status of UDS/SIMON and psych clearance. Not yet completed waiting on these to send PA.  Left message

## 2021-09-03 ENCOUNTER — HOSPITAL ENCOUNTER (OUTPATIENT)
Age: 31
Discharge: HOME | End: 2021-09-03
Payer: COMMERCIAL

## 2021-09-03 LAB
AMPHETAMINES: NEGATIVE
BARBITURATE SCREEN URINE: NEGATIVE
BENZODIAZEPINE SCREEN, URINE: NEGATIVE
CANNABINOID SCREEN URINE: NEGATIVE
COCAINE METABOLITE: NEGATIVE
OPIATES, URINE: NEGATIVE
OXYCODONE: NEGATIVE
PHENCYCLIDINE, URINE: NEGATIVE

## 2021-09-03 PROCEDURE — G0480 DRUG TEST DEF 1-7 CLASSES: HCPCS

## 2021-09-03 PROCEDURE — 80307 DRUG TEST PRSMV CHEM ANLYZR: CPT

## 2021-09-11 LAB
3-OH-COTININE URINE: <50 NG/ML
ANABASINE URINE: <5 NG/ML
COTININE, URINE: <15 NG/ML
NICOTINE AND METABOLITES: <15 NG/ML

## 2021-09-13 ENCOUNTER — TELEMEDICINE (OUTPATIENT)
Dept: FAMILY MEDICINE CLINIC | Age: 31
End: 2021-09-13
Payer: COMMERCIAL

## 2021-09-13 DIAGNOSIS — U07.1 COVID-19: Primary | ICD-10-CM

## 2021-09-13 DIAGNOSIS — R05.9 COUGH: ICD-10-CM

## 2021-09-13 DIAGNOSIS — R06.02 SHORTNESS OF BREATH: ICD-10-CM

## 2021-09-13 PROCEDURE — 99213 OFFICE O/P EST LOW 20 MIN: CPT | Performed by: PHYSICIAN ASSISTANT

## 2021-09-13 PROCEDURE — G8417 CALC BMI ABV UP PARAM F/U: HCPCS | Performed by: PHYSICIAN ASSISTANT

## 2021-09-13 PROCEDURE — G8428 CUR MEDS NOT DOCUMENT: HCPCS | Performed by: PHYSICIAN ASSISTANT

## 2021-09-13 PROCEDURE — 1036F TOBACCO NON-USER: CPT | Performed by: PHYSICIAN ASSISTANT

## 2021-09-13 RX ORDER — METHYLPREDNISOLONE 4 MG/1
TABLET ORAL
Qty: 1 KIT | Refills: 0 | Status: SHIPPED | OUTPATIENT
Start: 2021-09-13 | End: 2021-09-19

## 2021-09-13 RX ORDER — ALBUTEROL SULFATE 90 UG/1
2 AEROSOL, METERED RESPIRATORY (INHALATION) EVERY 6 HOURS PRN
Qty: 18 G | Refills: 3 | Status: ON HOLD | OUTPATIENT
Start: 2021-09-13 | End: 2021-11-01

## 2021-09-13 NOTE — PROGRESS NOTES
Rhina Callejas (:  1990) is a 28 y.o. female,Established patient, here for evaluation of the following chief complaint(s): No chief complaint on file. ASSESSMENT/PLAN:  1. COVID-19   -Visit limited to virtual encounter; somewhat difficult to assess patient's overall status; patient is laying in bed and appears uncomfortable, very hoarse voice with episodic rattly cough noted throughout visit; antibody infusion order; faxed to infusion center; advise patient and significant other on ongoing supportive care strategies and continuing to monitor for any worsening, discussed the limited nature of antibody treatments, unclear if patient will be able to obtain infusion. Will send in albuterol rescue inhaler for episodic shortness of breath and cough as well as Medrol Dosepak for which patient reports has helped with the prior upper respiratory infections. -Advised to reach out if symptoms worsen or report to emergency department if significantly short of breath etc.  Patient states understanding. 2. Shortness of breath  -     albuterol sulfate HFA (PROVENTIL HFA) 108 (90 Base) MCG/ACT inhaler; Inhale 2 puffs into the lungs every 6 hours as needed for Wheezing, Disp-18 g, R-3Normal  -     methylPREDNISolone (MEDROL DOSEPACK) 4 MG tablet; Take by mouth., Disp-1 kit, R-0Normal  3. Cough  -     albuterol sulfate HFA (PROVENTIL HFA) 108 (90 Base) MCG/ACT inhaler; Inhale 2 puffs into the lungs every 6 hours as needed for Wheezing, Disp-18 g, R-3Normal  -     methylPREDNISolone (MEDROL DOSEPACK) 4 MG tablet; Take by mouth., Disp-1 kit, R-0Normal      Return if symptoms worsen or fail to improve, for Follow Up. SUBJECTIVE/OBJECTIVE:  HPI  Rhina Callejas is a pleasant 28 y.o. female presenting to clinic today for COVID-19 infection.      Patient reports that initial symptoms began approximately 5 to 6 days ago; patient reports that she obtained at home Covid test result which was positive approximately 5 days ago; patient and significant other who is on the virtual visit reports that patient symptoms have been gradually worsening since that time; patient reports a significant productive cough of purulent sputum with occasional streaks of blood / red mucus. Patient reports intermittent fevers, one episode of vomiting, diarrhea, body aches; patient has been checking pulse oximeter which she reports is typically 96% at rest and then will drop down to 92 to 93% with walking. Patient reports she has been taking Aleve, Tylenol, Mucinex, vitamin C, zinc, elderberry.     Review of Systems    Patient-Reported Vitals 3/29/2021   Patient-Reported Weight 245 lbs   Patient-Reported Height 65\"        Physical Exam    [INSTRUCTIONS:  \"[x]\" Indicates a positive item  \"[]\" Indicates a negative item  -- DELETE ALL ITEMS NOT EXAMINED]    Constitutional: [x] Appears well-developed and well-nourished [x] No apparent distress      [] Abnormal -     Mental status: [x] Alert and awake  [x] Oriented to person/place/time [x] Able to follow commands    [] Abnormal -     Eyes:   EOM    [x]  Normal    [] Abnormal -   Sclera  [x]  Normal    [] Abnormal -          Discharge [x]  None visible   [] Abnormal -     HENT: [x] Normocephalic, atraumatic  [] Abnormal -   [x] Mouth/Throat: Mucous membranes are moist    External Ears [x] Normal  [] Abnormal -    Neck: [x] No visualized mass [] Abnormal -     Pulmonary/Chest: [x] Respiratory effort normal   [x] No visualized signs of difficulty breathing or respiratory distress        [] Abnormal -      Musculoskeletal:   [x] Normal gait with no signs of ataxia         [x] Normal range of motion of neck        [] Abnormal -     Neurological:        [x] No Facial Asymmetry (Cranial nerve 7 motor function) (limited exam due to video visit)          [x] No gaze palsy        [] Abnormal -          Skin:        [x] No significant exanthematous lesions or discoloration noted on facial skin         [] Abnormal - Psychiatric:       [x] Normal Affect [] Abnormal -        [x] No Hallucinations    Other pertinent observable physical exam findings:-                Daniel Villarreal, was evaluated through a synchronous (real-time) audio-video encounter. The patient (or guardian if applicable) is aware that this is a billable service. Verbal consent to proceed has been obtained within the past 12 months. The visit was conducted pursuant to the emergency declaration under the [address] [place] [address] and the [employer] General Act. Patient identification was verified, and a caregiver was present when appropriate. The patient was located in a state where the provider was credentialed to provide care. An electronic signature was used to authenticate this note.     --ILEANA Busch

## 2021-09-14 ENCOUNTER — HOSPITAL ENCOUNTER (OUTPATIENT)
Dept: INFUSION THERAPY | Age: 31
Setting detail: INFUSION SERIES
Discharge: HOME | End: 2021-09-14
Payer: COMMERCIAL

## 2021-09-14 VITALS
OXYGEN SATURATION: 100 % | RESPIRATION RATE: 18 BRPM | HEART RATE: 102 BPM | TEMPERATURE: 96.8 F | SYSTOLIC BLOOD PRESSURE: 143 MMHG | DIASTOLIC BLOOD PRESSURE: 94 MMHG

## 2021-09-14 PROCEDURE — 2580000003 HC RX 258: Performed by: PHYSICIAN ASSISTANT

## 2021-09-14 PROCEDURE — M0243 CASIRIVI AND IMDEVI INFUSION: HCPCS

## 2021-09-14 RX ORDER — SODIUM CHLORIDE 0.9 % (FLUSH) 0.9 %
5-40 SYRINGE (ML) INJECTION PRN
Status: DISCONTINUED | OUTPATIENT
Start: 2021-09-14 | End: 2021-09-15 | Stop reason: HOSPADM

## 2021-09-14 RX ADMIN — SODIUM CHLORIDE: 9 INJECTION, SOLUTION INTRAVENOUS at 13:19

## 2021-09-14 NOTE — DISCHARGE SUMMARY
[facility] Infusion well. Reviewed discharge instructions, understanding verbalized. Copies of AVS given to take home. Patient discharged home. Orders Placed This Encounter   Medications    casirivimab-imdevimab 1,200 mg in sodium chloride 0.9 % 110 mL IVPB     Order Specific Question:   Does this patient qualify for COVID-19 antibody therapy based on criteria for treatment? Answer:    Yes    sodium chloride flush 0.9 % injection 5-40 mL

## 2021-09-20 ENCOUNTER — E-VISIT (OUTPATIENT)
Dept: OTHER | Facility: CLINIC | Age: 31
End: 2021-09-20
Payer: COMMERCIAL

## 2021-09-20 ENCOUNTER — TELEPHONE (OUTPATIENT)
Dept: FAMILY MEDICINE CLINIC | Age: 31
End: 2021-09-20

## 2021-09-20 DIAGNOSIS — U07.1 COVID-19: ICD-10-CM

## 2021-09-20 DIAGNOSIS — U07.1 COVID-19: Primary | ICD-10-CM

## 2021-09-20 PROCEDURE — 99421 OL DIG E/M SVC 5-10 MIN: CPT | Performed by: PHYSICIAN ASSISTANT

## 2021-09-20 RX ORDER — BENZONATATE 100 MG/1
100 CAPSULE ORAL 2 TIMES DAILY PRN
Qty: 20 CAPSULE | Refills: 0 | Status: SHIPPED | OUTPATIENT
Start: 2021-09-20 | End: 2021-09-21 | Stop reason: SDUPTHER

## 2021-09-20 RX ORDER — AZITHROMYCIN 250 MG/1
250 TABLET, FILM COATED ORAL SEE ADMIN INSTRUCTIONS
Qty: 6 TABLET | Refills: 0 | Status: SHIPPED | OUTPATIENT
Start: 2021-09-20 | End: 2021-09-21 | Stop reason: SDUPTHER

## 2021-09-20 ASSESSMENT — LIFESTYLE VARIABLES
SMOKING_YEARS: 10
PACKS_PER_DAY: .5
SMOKING_STATUS: NO, BUT I USED TO SMOKE

## 2021-09-20 NOTE — TELEPHONE ENCOUNTER
Patient was evaluated by a e-visit today-the rx's you oordered printed on the printer in the office-I looked in the chart and it doesn't say that the medications were completed electronically-do you want me to send again or call them and give them a verbal order-please advise

## 2021-09-21 RX ORDER — AZITHROMYCIN 250 MG/1
250 TABLET, FILM COATED ORAL SEE ADMIN INSTRUCTIONS
Qty: 6 TABLET | Refills: 0 | Status: SHIPPED | OUTPATIENT
Start: 2021-09-21 | End: 2021-09-26

## 2021-09-21 RX ORDER — BENZONATATE 100 MG/1
100 CAPSULE ORAL 2 TIMES DAILY PRN
Qty: 20 CAPSULE | Refills: 0 | Status: SHIPPED | OUTPATIENT
Start: 2021-09-21 | End: 2021-09-28

## 2021-09-24 ENCOUNTER — TELEPHONE (OUTPATIENT)
Dept: BARIATRICS/WEIGHT MGMT | Age: 31
End: 2021-09-24

## 2021-09-24 NOTE — TELEPHONE ENCOUNTER
Called patient need to schedule her with Dr. Paloma England before I can get a date for surgery.  File is pulled and ready to send

## 2021-10-18 ENCOUNTER — TELEPHONE (OUTPATIENT)
Dept: BARIATRICS/WEIGHT MGMT | Age: 31
End: 2021-10-18

## 2021-10-18 ENCOUNTER — OFFICE VISIT (OUTPATIENT)
Dept: BARIATRICS/WEIGHT MGMT | Age: 31
End: 2021-10-18

## 2021-10-18 VITALS — HEIGHT: 65 IN | WEIGHT: 251.2 LBS | BODY MASS INDEX: 41.85 KG/M2

## 2021-10-18 DIAGNOSIS — E66.01 MORBID OBESITY WITH BMI OF 40.0-44.9, ADULT (HCC): Primary | ICD-10-CM

## 2021-10-18 PROCEDURE — 99999 PR OFFICE/OUTPT VISIT,PROCEDURE ONLY: CPT

## 2021-10-18 NOTE — TELEPHONE ENCOUNTER
[facility] #[id number]     11/1/2021 thru 11/2/2021     CPT 92580     ICD 10 E66.01     [facility] 11/1/2021

## 2021-10-18 NOTE — TELEPHONE ENCOUNTER
Spoke with patient while in office for preop diet. Advised while on work trip she needs to make sure she social distances, wears a mask, and washes hands. Patient also vaccinated and has had covid. She will do all requests.

## 2021-10-18 NOTE — PROGRESS NOTES
Outpatient Nutrition Counseling    REASON FOR VISIT: Pre-Op Diet Education    Chief Complaint:    Chief Complaint   Patient presents with    Weight Management       SUBJECTIVE:  Pt here to start 2 week liquid liver shrinking diet in preparation for Sleeve gastrectomy. Pt instructed on pre-op diet and complete post-op diet progression including tips for N/V, fluid/activity logs, recipes and vitamins. Pt verbalized understanding to all info provided. The patient is a 28 y.o. female being seen for morbid obesity, considering weight loss surgery; Judy's, Height: 5' 5\" (165.1 cm), Weight: 251 lb 3.2 oz (113.9 kg), Current Body mass index is 41.8 kg/m². The patient's PCP is Kary Echavarria PA-C     Comorbid Conditions:  Significant diseases affecting this patient are   Past Medical History:   Diagnosis Date    Depression     Diabetes mellitus ([facility] [address]     type 2-diet controlled. \"had gestational diabetes and my hbgA1c was 6.1- off medications for the past 2 yrs now just diet controlled    Group B streptococcal infection during pregnancy      (normal spontaneous vaginal delivery) 7-2-15    delivered in ambulance 12 minutes    Obesity, unspecified     Pancreatitis chronic     Problems with hearing     dr Kathie Middleton; only due to when she had a cold   . Review of Systems - Review of Systems  Otherwise per HPI. Allergies:   Allergies   Allergen Reactions    Wool Alcohol [Lanolin] Hives     \"wool\" ; hives/ blisters       Past Surgical History:  Past Surgical History:   Procedure Laterality Date    HEMORRHOID SURGERY      x3- last time 2019    TUBAL LIGATION  2015    UPPER GASTROINTESTINAL ENDOSCOPY N/A 2021    EGD BIOPSY performed by Hernán Starr MD at [address] [place] [address],[address]      reconstruction between vag/anal area from childbirth       Family History:  Family History   Adopted: Yes   Problem Relation Age of Onset   [facility] Breast Cancer Mother     Ovarian Cancer Mother     No Known Problems Father     Other Sister         autistic (1 sister)    Cancer Brother         eye, testicular       Social History:  Social History     Socioeconomic History    Marital status:      Spouse name: Not on file    Number of children: Not on file    Years of education: Not on file    Highest education level: Not on file   Occupational History    Not on file   Tobacco Use    Smoking status: Former Smoker     Packs/day: 0.50     Years: 8.00     Pack years: 4.00     Types: Cigarettes     Quit date: 2021     Years since quittin.6    Smokeless tobacco: Never Used    Tobacco comment:  smoking less than a pack a week she stated she should stop   Vaping Use    Vaping Use: Former    Substances: Nicotine (only used 1-2 times; then quit)   Substance and Sexual Activity    Alcohol use: Yes     Alcohol/week: 0.0 standard drinks     Comment: socially- average \"one time per week lbut none since started getting ready for surgeyr'    Drug use: Never    Sexual activity: Yes     Partners: Male   Other Topics Concern    Not on file   Social History Narrative    Not on file     Social Determinants of Health     Financial Resource Strain:     Difficulty of Paying Living Expenses:    Food Insecurity:     Worried About Running Out of Food in the Last Year:     [address] in the Last Year:    Transportation Needs:     Lack of Transportation (Medical):      Lack of Transportation (Non-Medical):    Physical Activity:     Days of Exercise per Week:     Minutes of Exercise per Session:    Stress:     Feeling of Stress :    Social Connections:     Frequency of Communication with Friends and Family:     Frequency of Social Gatherings with Friends and Family:     Attends [employer] Services:     Active Member of Clubs or Organizations:     Attends Club or Organization Meetings:     Marital Status:    Intimate Partner Violence:     Fear of Current or Ex-Partner:     Emotionally Abused:     Physically Abused:     Sexually Abused:          OBJECTIVE:  Physical Exam   Ht 5' 5\" (1.651 m)   Wt 251 lb 3.2 oz (113.9 kg)   BMI 41.80 kg/m²        NUTRITION DIAGNOSIS: Overweight / Obesity   Problem: Increased adiposity compared to reference standard or established norms   Etiology: Excess intake compared to output over time   S/S: Ht: 65\" Wt: 251.2 lbs BMI: 41.8    NUTRITION INTERVENTIONS:    Individualized treatment goals to address nutritiondiagnosis:   Instructed on 600-800 kcal diet for weight loss post-op   Provided tips for N/V, fluid/activity logs, recipes and vitamins   Encouraged Physical activity as approved by physician    MONITORING/ EVALUATION/ PLAN:   Pt verbalized understanding of allmaterials covered   Pt asked pertinent questions throughout the session - expect compliance with nutrition guidelines presented   Provided pt with contact information should questions arise prior to next visit   Will f/u with pt post-op  IDANIA Kumar MS, RDN, LD  10/18/2021

## 2021-10-21 ENCOUNTER — OFFICE VISIT (OUTPATIENT)
Dept: BARIATRICS/WEIGHT MGMT | Age: 31
End: 2021-10-21
Payer: COMMERCIAL

## 2021-10-21 ENCOUNTER — TELEPHONE (OUTPATIENT)
Dept: BARIATRICS/WEIGHT MGMT | Age: 31
End: 2021-10-21

## 2021-10-21 VITALS
HEART RATE: 72 BPM | HEIGHT: 65 IN | BODY MASS INDEX: 41.02 KG/M2 | WEIGHT: 246.2 LBS | DIASTOLIC BLOOD PRESSURE: 82 MMHG | SYSTOLIC BLOOD PRESSURE: 124 MMHG

## 2021-10-21 DIAGNOSIS — K21.9 GASTROESOPHAGEAL REFLUX DISEASE WITHOUT ESOPHAGITIS: ICD-10-CM

## 2021-10-21 DIAGNOSIS — E66.01 MORBID OBESITY WITH BMI OF 40.0-44.9, ADULT (HCC): Primary | ICD-10-CM

## 2021-10-21 PROCEDURE — G8417 CALC BMI ABV UP PARAM F/U: HCPCS | Performed by: SURGERY

## 2021-10-21 PROCEDURE — G8484 FLU IMMUNIZE NO ADMIN: HCPCS | Performed by: SURGERY

## 2021-10-21 PROCEDURE — 1036F TOBACCO NON-USER: CPT | Performed by: SURGERY

## 2021-10-21 PROCEDURE — 99214 OFFICE O/P EST MOD 30 MIN: CPT | Performed by: SURGERY

## 2021-10-21 PROCEDURE — G8427 DOCREV CUR MEDS BY ELIG CLIN: HCPCS | Performed by: SURGERY

## 2021-10-21 RX ORDER — HEPARIN SODIUM 5000 [USP'U]/ML
5000 INJECTION, SOLUTION INTRAVENOUS; SUBCUTANEOUS ONCE
Status: CANCELLED | OUTPATIENT
Start: 2021-10-21 | End: 2021-10-21

## 2021-10-21 RX ORDER — SODIUM CHLORIDE 0.9 % (FLUSH) 0.9 %
5-40 SYRINGE (ML) INJECTION EVERY 12 HOURS SCHEDULED
Status: CANCELLED | OUTPATIENT
Start: 2021-10-21

## 2021-10-21 RX ORDER — SODIUM CHLORIDE, SODIUM LACTATE, POTASSIUM CHLORIDE, CALCIUM CHLORIDE 600; 310; 30; 20 MG/100ML; MG/100ML; MG/100ML; MG/100ML
INJECTION, SOLUTION INTRAVENOUS CONTINUOUS
Status: CANCELLED | OUTPATIENT
Start: 2021-10-21

## 2021-10-21 RX ORDER — SCOLOPAMINE TRANSDERMAL SYSTEM 1 MG/1
1 PATCH, EXTENDED RELEASE TRANSDERMAL ONCE
Status: CANCELLED | OUTPATIENT
Start: 2021-10-21 | End: 2021-10-21

## 2021-10-21 RX ORDER — ONDANSETRON 2 MG/ML
4 INJECTION INTRAMUSCULAR; INTRAVENOUS ONCE
Status: CANCELLED | OUTPATIENT
Start: 2021-10-21 | End: 2021-10-21

## 2021-10-21 RX ORDER — SODIUM CHLORIDE 9 MG/ML
25 INJECTION, SOLUTION INTRAVENOUS PRN
Status: CANCELLED | OUTPATIENT
Start: 2021-10-21

## 2021-10-21 RX ORDER — SODIUM CHLORIDE 0.9 % (FLUSH) 0.9 %
5-40 SYRINGE (ML) INJECTION PRN
Status: CANCELLED | OUTPATIENT
Start: 2021-10-21

## 2021-10-21 NOTE — PROGRESS NOTES
BARIATRIC SURGERY OFFICE PROGRESS NOTE    SUBJECTIVE:    Patient presenting today referred from Winston Allen PA-C, for   Chief Complaint   Patient presents with   [facility] Weight Management     meet/greet. Sleeve scheduled 21. .    Vitals:    10/21/21 1013   BP: 124/82   Pulse: 72        BMI: Body mass index is 40.97 kg/m². Weight History: Wt Readings from Last 3 Encounters:   10/21/21 246 lb 3.2 oz (111.7 kg)   10/18/21 251 lb 3.2 oz (113.9 kg)   21 248 lb (112.5 kg)        If within 30 days of bariatric surgery date, have you been to the ED: Gregg Yepez is a 28 y.o. female presenting in seventh bariatric PRE-OP visit. Total weight loss/gain: -5 lbs since last visit, n/a lbs since surgery, -6.6 lbs since starting program    Thoroughly reviewed the patient's medical history, family history, social history and review of systems with the patient today in the office. Please see medical record for pertinent positives. Changes in health since last visit: None, doing well. Pt tracking calories: Yes. Pt exercising: Walking. Past Medical History:   Diagnosis Date    Depression     Diabetes mellitus ([address]     type 2-diet controlled. \"had gestational diabetes and my hbgA1c was 6.1- off medications for the past 2 yrs now just diet controlled    Group B streptococcal infection during pregnancy      (normal spontaneous vaginal delivery) 7-2-15    delivered in ambulance 12 minutes    Obesity, unspecified     Pancreatitis chronic     Problems with hearing     dr Al Perez; only due to when she had a cold        Patient Active Problem List   Diagnosis    Weight gain    Pre-diabetes    Lesion of buccal mucosa    Tobacco use    Obesity    Moderate episode of recurrent major depressive disorder (HCC)    Anxiety    Positive depression screening    Generalized body aches    Viral URI    Morbid obesity with BMI of 40.0-44.9, adult ([address]    Complex third degree hemorrhoid Past Surgical History:   Procedure Laterality Date    HEMORRHOID SURGERY      x3- last time 2019    TUBAL LIGATION  2015 August    UPPER GASTROINTESTINAL ENDOSCOPY N/A 7/20/2021    EGD BIOPSY performed by Ann Marie Jarrett MD at [address]  2015    reconstruction between vag/anal area from childbirth       Current Outpatient Medications   Medication Sig Dispense Refill    albuterol sulfate HFA (PROVENTIL HFA) 108 (90 Base) MCG/ACT inhaler Inhale 2 puffs into the lungs every 6 hours as needed for Wheezing 18 g 3    omeprazole (PRILOSEC) 40 MG delayed release capsule Take 1 capsule by mouth every morning (before breakfast) 30 capsule 2    pantoprazole (PROTONIX) 40 MG tablet Take 1 tablet by mouth 2 times daily (Patient not taking: Reported on 8/18/2021) 60 tablet 3     No current facility-administered medications for this visit. Allergies   Allergen Reactions    Wool Alcohol [Lanolin] Hives     \"wool\" ; hives/ blisters         Review of Systems   All other systems reviewed and are negative. OBJECTIVE:    /82   Pulse 72   Ht 5' 5\" (1.651 m)   Wt 246 lb 3.2 oz (111.7 kg)   BMI 40.97 kg/m²      Physical Exam  Vitals reviewed. Constitutional:       General: She is not in acute distress. Appearance: She is not ill-appearing, toxic-appearing or diaphoretic. HENT:      Head: Normocephalic and atraumatic. Right Ear: External ear normal.      Left Ear: External ear normal.      Nose: Nose normal.   Eyes:      General:         Right eye: No discharge. Left eye: No discharge. Extraocular Movements: Extraocular movements intact. Cardiovascular:      Rate and Rhythm: Normal rate. Pulses: Normal pulses. Abdominal:      Palpations: Abdomen is soft. Tenderness: There is no abdominal tenderness. There is no guarding or rebound. Musculoskeletal:         General: No swelling. Cervical back: Normal range of motion.    Skin: General: Skin is warm. Neurological:      General: No focal deficit present. Mental Status: She is alert. Psychiatric:         Mood and Affect: Mood normal.         ASSESSMENT & PLAN:    1. Morbid obesity with BMI of 40.0-44.9, adult ([facility] [address]  -Planned SG. Consent obtained. Reviewed in detail with the patient and/or family the expected pre-operative, operative, and post-operative courses including risks, benefits, and alternatives to the procedure. The patient's questions were answered in detail and agreed to proceed with the procedure.   -Will need COVID test. The patient was counseled at length about the risks of ingris Covid-19 during their perioperative period and any recovery window from their procedure. The patient was made aware that ingris Covid-19  may worsen their prognosis for recovering from their procedure  and lend to a higher morbidity and/or mortality risk. All material risks, benefits, and reasonable alternatives including postponing the procedure were discussed. The patient does wish to proceed with the procedure at this time.    -Liver shrinking diet reviewed. -Orders placed    2. Gastroesophageal reflux disease without esophagitis  -Prilosec 40 mg daily  -Monitor  -Avoid trigger foods             As of current visit, regarding obesity-related co-morbid conditions:  LITZY [] compliant [] no longer using [] resolved per sleep study; hypertension [] medications; hyperlipidemia [] medications; GERD [] medications; DM [] insulin [] non-insulin [] no meds       Patient was encouraged to journal all food intake. Keep calorie level at approximately 1200, per discussion / plan with registered dietician. Protein intake is to be a minimum of 40-50 grams per day. Water drinking was encouraged with a goal of 64oz-128oz daily. Beverages are to be calorie free except for milk. Avoid soda and other carbonated beverages. Continue to increase level of physical activity.       I

## 2021-10-25 ENCOUNTER — HOSPITAL ENCOUNTER (OUTPATIENT)
Age: 31
Setting detail: SPECIMEN
Discharge: HOME | End: 2021-10-25
Payer: COMMERCIAL

## 2021-10-25 ENCOUNTER — NURSE ONLY (OUTPATIENT)
Dept: SURGERY | Age: 31
End: 2021-10-25
Payer: COMMERCIAL

## 2021-10-25 VITALS — HEIGHT: 65 IN | BODY MASS INDEX: 40.54 KG/M2 | WEIGHT: 243.3 LBS

## 2021-10-25 DIAGNOSIS — Z01.818 PRE-OP TESTING: Primary | ICD-10-CM

## 2021-10-25 PROCEDURE — 99211 OFF/OP EST MAY X REQ PHY/QHP: CPT | Performed by: SURGERY

## 2021-10-25 PROCEDURE — U0005 INFEC AGEN DETEC AMPLI PROBE: HCPCS

## 2021-10-25 PROCEDURE — U0003 INFECTIOUS AGENT DETECTION BY NUCLEIC ACID (DNA OR RNA); SEVERE ACUTE RESPIRATORY SYNDROME CORONAVIRUS 2 (SARS-COV-2) (CORONAVIRUS DISEASE [COVID-19]), AMPLIFIED PROBE TECHNIQUE, MAKING USE OF HIGH THROUGHPUT TECHNOLOGIES AS DESCRIBED BY CMS-2020-01-R: HCPCS

## 2021-10-25 NOTE — PROGRESS NOTES
.Surgery 11/1/21   you will be called  10/29/21 with times               1. Do not eat or drink anything after midnight - unless instructed by your doctor prior to surgery. This includes                   no water, chewing gum or mints. 2. Follow your directions as prescribed by the doctor for your procedure and medications. 3. Check with your Doctor regarding stopping vitamins, supplements, blood thinners (Plavix, Coumadin, Lovenox, Effient, Pradaxa, Xarelto, Fragmin or                   other blood thinners) and follow their instructions. Stop all supplements 7 days before surgery. Morning of surgery use your inhaler and take Omeprazole  with a small sip of water. 4. Do not smoke, and do not drink any alcoholic beverages 24 hours prior to surgery. This includes NA Beer. 5. You may brush your teeth and gargle the morning of surgery. DO NOT SWALLOW WATER   6. You MUST make arrangements for a responsible adult to take you home after your surgery and be able to check on you every couple                   hours for the day. You will not be allowed to leave alone or drive yourself home. It is strongly suggested someone stay with you the first 24                   hrs. Your surgery will be cancelled if you do not have a ride home. 7. Please wear simple, loose fitting clothing to the hospital.  Esthela Black not bring valuables (money, credit cards, checkbooks, etc.) Do not wear any                   makeup (including no eye makeup) or nail polish on your fingers or toes. 8. DO NOT wear any jewelry or piercings on day of surgery. All body piercing jewelry must be removed. 9. If you have dentures, they will be removed before going to the OR; we will provide you a container. If you wear contact lenses or glasses,                  they will be removed; please bring a case for them.            10. If you  have a Living Will and Durable Power of  for Healthcare, please bring in a copy.           11. Please bring picture ID,  insurance card, paperwork from the doctors office    (H & P, Consent, & card for implantable devices). 12. Take a shower the night before or morning of your procedure, do not apply any lotion, oil or powder. 13. Wear a mask covering your nose & mouth when entering the hospital. Have your covid-19 test performed within 2-7 days of your                  surgery. Quarantine yourself after the test until after your surgery. COVID TEST was positive on 9/8/21. Patient to bring me copy of positive results from 9/8/21. Patient was Covid tested again on 10/25/21 (May come back positive again due to antibodies). Patient coming to hospital 10/27/21 to do Gastric Sleeve Pre-Op Protocol labs.

## 2021-10-25 NOTE — PATIENT INSTRUCTIONS
Pre-Procedure COVID-19 Self Testing  Quarantine Instructions  Day of Surgery Instructions         What to do before my surgery:    All patients scheduled for elective surgery must test for COVID19 72-96 hours prior to the surgery date.  Pre-Procedure COVID-19 Self-Test will be scheduled for you by your provider.  You can receive your Pre-Procedure COVID-19 Self-Test at:  [facility]. [address] [facility], [facility], [facility], [address]   If you do not have the COVID-19 test we will cancel or reschedule your procedure   Once you test you must quarantine at home until after your procedure with only your immediate family members or whoever lives with you.  If you must work during your quarantine period, we ask that you continue to practice social distancing, wear a mask that covers your mouth and nose and perform all hand hygiene as recommended by the CDC.  If you must go to the grocery, etc. and cannot get someone to do this for you please wear a mask that covers your mouth and nose and perform all hand hygiene as recommended by the CDC.  Your surgeon's office will notify you with any concerns about your test result. What can I expect on the day of surgery?  Arrive at the time the office or hospital staff tell you on the day of your procedure.  Wear a mask when entering the hospital.     A member of the hospital staff will take your temperature and ask you a few questions as you enter the building.  In abundance of caution for the safety of all our patients and staff, please follow all hospital visitor guidelines in place at the time of your procedure. The staff caring for you will stay in close communication with your loved one and keep them updated on progress.  Please provide a phone number for us to use when communicating with your family or ride home.    When you are ready to discharge, we will notify your family/person with you to bring the car to the front entrance. We will take you to them after you receive all of your discharge instructions.

## 2021-10-25 NOTE — PROGRESS NOTES
Patient collected pre-op COVID-19 screening instruction and collection supplies given to patient accordingly. Patient denies fever/cough/sob or recent travels. Patient voiced understanding of collection/quarantine instructions. COVID screening lab ordered, collection completed without difficulty, identifiers placed on specimen. AVS given at discharge. Results will be given via mychart or telephone call [facility] Dept will manage any positive test results, the procedure will be rescheduled at a later date).

## 2021-10-26 LAB
SARS-COV-2: NOT DETECTED
SOURCE: NORMAL

## 2021-10-27 ENCOUNTER — HOSPITAL ENCOUNTER (OUTPATIENT)
Age: 31
Discharge: HOME | End: 2021-10-27
Payer: COMMERCIAL

## 2021-10-27 DIAGNOSIS — Z01.818 ENCOUNTER FOR PREADMISSION TESTING: ICD-10-CM

## 2021-10-27 LAB
ALBUMIN SERPL-MCNC: 4.6 GM/DL (ref 3.4–5)
ALP BLD-CCNC: 81 IU/L (ref 40–129)
ALT SERPL-CCNC: 31 U/L (ref 10–40)
AMPHETAMINES: NEGATIVE
ANION GAP SERPL CALCULATED.3IONS-SCNC: 13 MMOL/L (ref 4–16)
AST SERPL-CCNC: 24 IU/L (ref 15–37)
BARBITURATE SCREEN URINE: NEGATIVE
BASOPHILS ABSOLUTE: 0 K/CU MM
BASOPHILS RELATIVE PERCENT: 0.4 % (ref 0–1)
BENZODIAZEPINE SCREEN, URINE: NEGATIVE
BILIRUB SERPL-MCNC: 0.2 MG/DL (ref 0–1)
BUN BLDV-MCNC: 7 MG/DL (ref 6–23)
CALCIUM SERPL-MCNC: 9.4 MG/DL (ref 8.3–10.6)
CANNABINOID SCREEN URINE: NEGATIVE
CHLORIDE BLD-SCNC: 105 MMOL/L (ref 99–110)
CO2: 23 MMOL/L (ref 21–32)
COCAINE METABOLITE: NEGATIVE
CREAT SERPL-MCNC: 0.6 MG/DL (ref 0.6–1.1)
DIFFERENTIAL TYPE: ABNORMAL
EOSINOPHILS ABSOLUTE: 0.1 K/CU MM
EOSINOPHILS RELATIVE PERCENT: 1.6 % (ref 0–3)
ESTIMATED AVERAGE GLUCOSE: 126 MG/DL
GFR AFRICAN AMERICAN: >60 ML/MIN/1.73M2
GFR NON-AFRICAN AMERICAN: >60 ML/MIN/1.73M2
GLUCOSE BLD-MCNC: 108 MG/DL (ref 70–99)
GONADOTROPIN, CHORIONIC (HCG) QUANT: 0.5 UIU/ML
HBA1C MFR BLD: 6 % (ref 4.2–6.3)
HCT VFR BLD CALC: 40.9 % (ref 37–47)
HEMOGLOBIN: 12.9 GM/DL (ref 12.5–16)
IMMATURE NEUTROPHIL %: 0.3 % (ref 0–0.43)
LYMPHOCYTES ABSOLUTE: 2.7 K/CU MM
LYMPHOCYTES RELATIVE PERCENT: 38.4 % (ref 24–44)
MCH RBC QN AUTO: 28.3 PG (ref 27–31)
MCHC RBC AUTO-ENTMCNC: 31.5 % (ref 32–36)
MCV RBC AUTO: 89.7 FL (ref 78–100)
MONOCYTES ABSOLUTE: 0.5 K/CU MM
MONOCYTES RELATIVE PERCENT: 7.5 % (ref 0–4)
NUCLEATED RBC %: 0 %
OPIATES, URINE: NEGATIVE
OXYCODONE: NEGATIVE
PDW BLD-RTO: 14.2 % (ref 11.7–14.9)
PHENCYCLIDINE, URINE: NEGATIVE
PLATELET # BLD: 264 K/CU MM (ref 140–440)
PMV BLD AUTO: 10.5 FL (ref 7.5–11.1)
POTASSIUM SERPL-SCNC: 4.4 MMOL/L (ref 3.5–5.1)
RBC # BLD: 4.56 M/CU MM (ref 4.2–5.4)
SEGMENTED NEUTROPHILS ABSOLUTE COUNT: 3.6 K/CU MM
SEGMENTED NEUTROPHILS RELATIVE PERCENT: 51.8 % (ref 36–66)
SODIUM BLD-SCNC: 141 MMOL/L (ref 135–145)
TOTAL IMMATURE NEUTOROPHIL: 0.02 K/CU MM
TOTAL NUCLEATED RBC: 0 K/CU MM
TOTAL PROTEIN: 6.7 GM/DL (ref 6.4–8.2)
WBC # BLD: 6.9 K/CU MM (ref 4–10.5)

## 2021-10-27 PROCEDURE — 80307 DRUG TEST PRSMV CHEM ANLYZR: CPT

## 2021-10-27 PROCEDURE — 85025 COMPLETE CBC W/AUTO DIFF WBC: CPT

## 2021-10-27 PROCEDURE — 84702 CHORIONIC GONADOTROPIN TEST: CPT

## 2021-10-27 PROCEDURE — 36415 COLL VENOUS BLD VENIPUNCTURE: CPT

## 2021-10-27 PROCEDURE — 83036 HEMOGLOBIN GLYCOSYLATED A1C: CPT

## 2021-10-27 PROCEDURE — 80053 COMPREHEN METABOLIC PANEL: CPT

## 2021-10-29 ENCOUNTER — ANESTHESIA EVENT (OUTPATIENT)
Dept: OPERATING ROOM | Age: 31
DRG: 621 | End: 2021-10-29
Payer: COMMERCIAL

## 2021-10-29 ENCOUNTER — OFFICE VISIT (OUTPATIENT)
Dept: BARIATRICS/WEIGHT MGMT | Age: 31
End: 2021-10-29

## 2021-10-29 VITALS — HEIGHT: 65 IN | BODY MASS INDEX: 40.9 KG/M2 | WEIGHT: 245.5 LBS

## 2021-10-29 DIAGNOSIS — E66.01 MORBID OBESITY WITH BMI OF 40.0-44.9, ADULT (HCC): Primary | ICD-10-CM

## 2021-10-29 PROCEDURE — 99999 PR OFFICE/OUTPT VISIT,PROCEDURE ONLY: CPT

## 2021-10-29 NOTE — PROGRESS NOTES
Outpatient Nutrition Counseling    REASON FOR VISIT: Weigh-In    Chief Complaint:    Chief Complaint   Patient presents with    Weight Management       SUBJECTIVE:  Pt here to weigh-in prior to scheduled Sleeve Gastrectomy on . Has lost 5.6 lbs so far. Pt has been having diarrhea and vomiting the past several days. Transitioned pt off liquid liver shrink diet and onto food-based plan yesterday by phone. Pt has not tried any solid foods yet. Was still having diarrhea yesterday. Pt had not taken any medication for diarrhea - instructed to use Immodium. Pt instructed on discharge checklist and given pre-surgery drink as well. Pt voiced understanding. The patient is a 28 y.o. female being seen for morbid obesity, considering weight loss surgery; Judy's, Height: 5' 5\" (165.1 cm), Weight: 245 lb 8 oz (111.4 kg), Current Body mass index is 40.85 kg/m². The patient's PCP is Fabiola Deshpande PA-C     Comorbid Conditions:  Significant diseases affecting this patient are   Past Medical History:   Diagnosis Date    Depression     Diabetes mellitus ([facility] [address]     type 2-diet controlled. \"had gestational diabetes and my hbgA1c was 6.1- off medications for the past 2 yrs now just diet controlled    GERD (gastroesophageal reflux disease)     Group B streptococcal infection during pregnancy      (normal spontaneous vaginal delivery) 7-2-15    delivered in ambulance 12 minutes    Obesity, unspecified     Pancreatitis chronic     Problems with hearing     dr Sara Salazar; only due to when she had a cold   . Review of Systems - Review of Systems  Otherwise per HPI. Allergies:   Allergies   Allergen Reactions    Wool Alcohol [Lanolin] Hives     \"wool\" ; hives/ blisters       Past Surgical History:  Past Surgical History:   Procedure Laterality Date    ENDOSCOPY, COLON, DIAGNOSTIC      HEMORRHOID SURGERY      x3- last time 2019    TUBAL LIGATION  2015    UPPER GASTROINTESTINAL ENDOSCOPY N/A 2021    EGD BIOPSY performed by Jere Ballard MD at [address]  2015    reconstruction between vag/anal area from childbirth       Family History:  Family History   Adopted: Yes   Problem Relation Age of Onset    Breast Cancer Mother     Ovarian Cancer Mother     No Known Problems Father     Other Sister         autistic (1 sister)    Cancer Brother         eye, testicular       Social History:  Social History     Socioeconomic History    Marital status:      Spouse name: Not on file    Number of children: Not on file    Years of education: Not on file    Highest education level: Not on file   Occupational History    Not on file   Tobacco Use    Smoking status: Former Smoker     Packs/day: 0.50     Years: 8.00     Pack years: 4.00     Types: Cigarettes     Quit date: 2021     Years since quittin.6    Smokeless tobacco: Never Used   Vaping Use    Vaping Use: Former    Substances: Nicotine (only used 1-2 times; then quit)   Substance and Sexual Activity    Alcohol use: Not Currently     Alcohol/week: 0.0 standard drinks     Comment: socially- average \"one time per week lbut none since started getting ready for surgeyr'    Drug use: Never    Sexual activity: Yes     Partners: Male   Other Topics Concern    Not on file   Social History Narrative    Not on file     Social Determinants of Health     Financial Resource Strain:     Difficulty of Paying Living Expenses:    Food Insecurity:     Worried About [address] in the Last Year:     [address] in the Last Year:    Transportation Needs:     Lack of Transportation (Medical):      Lack of Transportation (Non-Medical):    Physical Activity:     Days of Exercise per Week:     Minutes of Exercise per Session:    Stress:     Feeling of Stress :    Social Connections:     Frequency of Communication with Friends and Family:     Frequency of Social Gatherings with Friends and Family:     Attends [employer] Services:     Active Member of Clubs or Organizations:     Attends Club or Organization Meetings:     Marital Status:    Intimate Partner Violence:     Fear of Current or Ex-Partner:     Emotionally Abused:     Physically Abused:     Sexually Abused:          OBJECTIVE:  Physical Exam   Ht 5' 5\" (1.651 m)   Wt 245 lb 8 oz (111.4 kg)   LMP 10/01/2021   BMI 40.85 kg/m²        NUTRITION DIAGNOSIS: Overweight / Obesity   Problem: Increased adiposity compared to reference standard or established norms   Etiology: Excess intake compared to output over time   S/S: Ht: 65\" Wt: 245.5 lbs BMI: 40.85    NUTRITION INTERVENTIONS:    Individualized treatment goals to address nutritiondiagnosis:   Instructed on 600-800 kcal diet for weight loss post-op   Provided discharge checklist and pre-surgery drink   Encouraged Physical activity as approved by physician    MONITORING/ EVALUATION/ PLAN:   Pt verbalized understanding of allmaterials covered   Pt asked pertinent questions throughout the session - expect compliance with nutrition guidelines presented   Provided pt with contact information should questions arise prior to next visit   Will f/u with pt post-op  IDANIA Kumar MS, RDN, LD  10/29/2021

## 2021-10-29 NOTE — ANESTHESIA PRE PROCEDURE
Department of Anesthesiology  Preprocedure Note       Name:  Claudia Young   Age:  28 y.o.  :  1990                                          MRN:  [id number]         Date:  10/29/2021      Surgeon: Fabian Luz):  Jeanine Schmidt MD    Procedure: Procedure(s):  GASTRECTOMY SLEEVE LAPAROSCOPIC ROBOTIC POSS HIATAL HERNIA REPAIR  EGD ESOPHAGOGASTRODUODENOSCOPY    Medications prior to admission:   Prior to Admission medications    Medication Sig Start Date End Date Taking? Authorizing Provider   albuterol sulfate HFA (PROVENTIL HFA) 108 (90 Base) MCG/ACT inhaler Inhale 2 puffs into the lungs every 6 hours as needed for Wheezing 21   ILEANA Rizzo   omeprazole (PRILOSEC) 40 MG delayed release capsule Take 1 capsule by mouth every morning (before breakfast) 21   Alex Sims MD       Current medications:    Current Outpatient Medications   Medication Sig Dispense Refill    albuterol sulfate HFA (PROVENTIL HFA) 108 (90 Base) MCG/ACT inhaler Inhale 2 puffs into the lungs every 6 hours as needed for Wheezing 18 g 3    omeprazole (PRILOSEC) 40 MG delayed release capsule Take 1 capsule by mouth every morning (before breakfast) 30 capsule 2     No current facility-administered medications for this visit. Allergies:     Allergies   Allergen Reactions    Wool Alcohol [Lanolin] Hives     \"wool\" ; hives/ blisters       Problem List:    Patient Active Problem List   Diagnosis Code    Weight gain R63.5    Pre-diabetes R73.03    Lesion of buccal mucosa K13.70    Tobacco use Z72.0    Obesity E66.9    Moderate episode of recurrent major depressive disorder (HCC) F33.1    Anxiety F41.9    Positive depression screening Z13.31    Generalized body aches R52    Viral URI J06.9    Morbid obesity with BMI of 40.0-44.9, adult (HCC) E66.01, Z68.41    Complex third degree hemorrhoid K64.2       Past Medical History:        Diagnosis Date    Depression     Diabetes mellitus ([facility] [address]     type 2-diet controlled. \"had gestational diabetes and my hbgA1c was 6.1- off medications for the past 2 yrs now just diet controlled    GERD (gastroesophageal reflux disease)     Group B streptococcal infection during pregnancy      (normal spontaneous vaginal delivery) 7-2-15    delivered in ambulance 12 minutes    Obesity, unspecified     Pancreatitis chronic     Problems with hearing     dr Kathie Middleton; only due to when she had a cold       Past Surgical History:        Procedure Laterality Date    ENDOSCOPY, COLON, DIAGNOSTIC      HEMORRHOID SURGERY      x3- last time 2019    TUBAL LIGATION  2015    UPPER GASTROINTESTINAL ENDOSCOPY N/A 2021    EGD BIOPSY performed by Hernán Starr MD at [address] [place] [address],[address]      reconstruction between vag/anal area from childbirth       Social History:    Social History     Tobacco Use    Smoking status: Former Smoker     Packs/day: 0.50     Years: 8.00     Pack years: 4.00     Types: Cigarettes     Quit date: 2021     Years since quittin.6    Smokeless tobacco: Never Used   Substance Use Topics    Alcohol use: Not Currently     Alcohol/week: 0.0 standard drinks     Comment: socially- average \"one time per week lbut none since started getting ready for surgeyr'                                Counseling given: Not Answered      Vital Signs (Current): There were no vitals filed for this visit. BP Readings from Last 3 Encounters:   10/21/21 124/82   21 (!) 143/94   21 124/82       NPO Status:                                             12 hrs.                                     BMI:   Wt Readings from Last 3 Encounters:   10/25/21 243 lb 4.8 oz (110.4 kg)   10/21/21 246 lb 3.2 oz (111.7 kg)   10/18/21 251 lb 3.2 oz (113.9 kg)     There is no height or weight on file to calculate BMI.    CBC:   Lab Results   Component Value Date    WBC 6.9 10/27/2021    RBC 4.56 10/27/2021    HGB precordial QRS contours -nondiagnostic for this age   possible septal Q-waves     Neuro/Psych:   (+) depression/anxiety             GI/Hepatic/Renal:   (+) GERD:, morbid obesity          Endo/Other:    (+) DiabetesType II DM, , .                 Abdominal:             Vascular: negative vascular ROS. Other Findings:             Anesthesia Plan      general     ASA 2     (Chart review 10/29/21)  Induction: intravenous. MIPS: Postoperative opioids intended and Prophylactic antiemetics administered. Anesthetic plan and risks discussed with patient. Plan discussed with CRNA. CARLEE Zamarripa CRNA   10/29/2021        Pre Anesthesia Evaluation complete. Anesthesia plan, risks, benefits, alternatives, and personnel discussed with patient and/or legal guardian. Patient and/or legal guardian verbalized an understanding and agreed to proceed. Anesthesia plan discussed with care team members and agreed upon.   CARLEE Zamarripa CRNA  10/29/2021

## 2021-11-01 ENCOUNTER — ANESTHESIA (OUTPATIENT)
Dept: OPERATING ROOM | Age: 31
DRG: 621 | End: 2021-11-01
Payer: COMMERCIAL

## 2021-11-01 ENCOUNTER — HOSPITAL ENCOUNTER (INPATIENT)
Age: 31
LOS: 2 days | Discharge: HOME | DRG: 621 | End: 2021-11-03
Attending: SURGERY | Admitting: SURGERY
Payer: COMMERCIAL

## 2021-11-01 VITALS
SYSTOLIC BLOOD PRESSURE: 136 MMHG | RESPIRATION RATE: 7 BRPM | TEMPERATURE: 97.7 F | DIASTOLIC BLOOD PRESSURE: 82 MMHG | OXYGEN SATURATION: 100 %

## 2021-11-01 DIAGNOSIS — E66.01 MORBID OBESITY (HCC): ICD-10-CM

## 2021-11-01 DIAGNOSIS — Z01.818 ENCOUNTER FOR PREADMISSION TESTING: Primary | ICD-10-CM

## 2021-11-01 LAB
GLUCOSE BLD-MCNC: 108 MG/DL (ref 70–99)
GLUCOSE BLD-MCNC: 117 MG/DL (ref 70–99)
GLUCOSE BLD-MCNC: 161 MG/DL (ref 70–99)
PREGNANCY TEST URINE, POC: NEGATIVE

## 2021-11-01 PROCEDURE — 81025 URINE PREGNANCY TEST: CPT

## 2021-11-01 PROCEDURE — 2780000010 HC IMPLANT OTHER: Performed by: SURGERY

## 2021-11-01 PROCEDURE — 7100000001 HC PACU RECOVERY - ADDTL 15 MIN: Performed by: SURGERY

## 2021-11-01 PROCEDURE — C1889 IMPLANT/INSERT DEVICE, NOC: HCPCS | Performed by: SURGERY

## 2021-11-01 PROCEDURE — S2900 ROBOTIC SURGICAL SYSTEM: HCPCS | Performed by: SURGERY

## 2021-11-01 PROCEDURE — 3700000001 HC ADD 15 MINUTES (ANESTHESIA): Performed by: SURGERY

## 2021-11-01 PROCEDURE — 3600000009 HC SURGERY ROBOT BASE: Performed by: SURGERY

## 2021-11-01 PROCEDURE — 2720000010 HC SURG SUPPLY STERILE: Performed by: SURGERY

## 2021-11-01 PROCEDURE — 6370000000 HC RX 637 (ALT 250 FOR IP): Performed by: SURGERY

## 2021-11-01 PROCEDURE — 2500000003 HC RX 250 WO HCPCS: Performed by: SURGERY

## 2021-11-01 PROCEDURE — 7100000000 HC PACU RECOVERY - FIRST 15 MIN: Performed by: SURGERY

## 2021-11-01 PROCEDURE — 88307 TISSUE EXAM BY PATHOLOGIST: CPT

## 2021-11-01 PROCEDURE — 3700000000 HC ANESTHESIA ATTENDED CARE: Performed by: SURGERY

## 2021-11-01 PROCEDURE — 2580000003 HC RX 258: Performed by: SURGERY

## 2021-11-01 PROCEDURE — 88342 IMHCHEM/IMCYTCHM 1ST ANTB: CPT

## 2021-11-01 PROCEDURE — 0DB64Z3 EXCISION OF STOMACH, PERCUTANEOUS ENDOSCOPIC APPROACH, VERTICAL: ICD-10-PCS | Performed by: SURGERY

## 2021-11-01 PROCEDURE — 6360000002 HC RX W HCPCS: Performed by: NURSE ANESTHETIST, CERTIFIED REGISTERED

## 2021-11-01 PROCEDURE — 3600000019 HC SURGERY ROBOT ADDTL 15MIN: Performed by: SURGERY

## 2021-11-01 PROCEDURE — 6360000002 HC RX W HCPCS: Performed by: SURGERY

## 2021-11-01 PROCEDURE — 2709999900 HC NON-CHARGEABLE SUPPLY: Performed by: SURGERY

## 2021-11-01 PROCEDURE — 6360000002 HC RX W HCPCS: Performed by: ANESTHESIOLOGY

## 2021-11-01 PROCEDURE — 8E0W4CZ ROBOTIC ASSISTED PROCEDURE OF TRUNK REGION, PERCUTANEOUS ENDOSCOPIC APPROACH: ICD-10-PCS | Performed by: SURGERY

## 2021-11-01 PROCEDURE — 82962 GLUCOSE BLOOD TEST: CPT

## 2021-11-01 PROCEDURE — 1200000000 HC SEMI PRIVATE

## 2021-11-01 PROCEDURE — 43775 LAP SLEEVE GASTRECTOMY: CPT | Performed by: SURGERY

## 2021-11-01 PROCEDURE — 2500000003 HC RX 250 WO HCPCS: Performed by: NURSE ANESTHETIST, CERTIFIED REGISTERED

## 2021-11-01 RX ORDER — ONDANSETRON 2 MG/ML
INJECTION INTRAMUSCULAR; INTRAVENOUS PRN
Status: DISCONTINUED | OUTPATIENT
Start: 2021-11-01 | End: 2021-11-01 | Stop reason: SDUPTHER

## 2021-11-01 RX ORDER — MORPHINE SULFATE 2 MG/ML
2 INJECTION, SOLUTION INTRAMUSCULAR; INTRAVENOUS
Status: DISCONTINUED | OUTPATIENT
Start: 2021-11-01 | End: 2021-11-03 | Stop reason: HOSPADM

## 2021-11-01 RX ORDER — FENTANYL CITRATE 50 UG/ML
25 INJECTION, SOLUTION INTRAMUSCULAR; INTRAVENOUS EVERY 5 MIN PRN
Status: DISCONTINUED | OUTPATIENT
Start: 2021-11-01 | End: 2021-11-01 | Stop reason: HOSPADM

## 2021-11-01 RX ORDER — MORPHINE SULFATE 4 MG/ML
4 INJECTION, SOLUTION INTRAMUSCULAR; INTRAVENOUS
Status: DISCONTINUED | OUTPATIENT
Start: 2021-11-01 | End: 2021-11-03 | Stop reason: HOSPADM

## 2021-11-01 RX ORDER — METOCLOPRAMIDE HYDROCHLORIDE 5 MG/ML
10 INJECTION INTRAMUSCULAR; INTRAVENOUS EVERY 6 HOURS PRN
Status: DISCONTINUED | OUTPATIENT
Start: 2021-11-01 | End: 2021-11-03 | Stop reason: HOSPADM

## 2021-11-01 RX ORDER — POTASSIUM CHLORIDE 7.45 MG/ML
10 INJECTION INTRAVENOUS PRN
Status: DISCONTINUED | OUTPATIENT
Start: 2021-11-01 | End: 2021-11-03 | Stop reason: HOSPADM

## 2021-11-01 RX ORDER — SODIUM CHLORIDE 0.9 % (FLUSH) 0.9 %
5-40 SYRINGE (ML) INJECTION EVERY 12 HOURS SCHEDULED
Status: DISCONTINUED | OUTPATIENT
Start: 2021-11-01 | End: 2021-11-01 | Stop reason: HOSPADM

## 2021-11-01 RX ORDER — CEFAZOLIN SODIUM 2 G/100ML
2000 INJECTION, SOLUTION INTRAVENOUS EVERY 8 HOURS
Status: COMPLETED | OUTPATIENT
Start: 2021-11-01 | End: 2021-11-02

## 2021-11-01 RX ORDER — PROPOFOL 10 MG/ML
INJECTION, EMULSION INTRAVENOUS PRN
Status: DISCONTINUED | OUTPATIENT
Start: 2021-11-01 | End: 2021-11-01 | Stop reason: SDUPTHER

## 2021-11-01 RX ORDER — SUCCINYLCHOLINE/SOD CL,ISO/PF 100 MG/5ML
SYRINGE (ML) INTRAVENOUS PRN
Status: DISCONTINUED | OUTPATIENT
Start: 2021-11-01 | End: 2021-11-01 | Stop reason: SDUPTHER

## 2021-11-01 RX ORDER — ROCURONIUM BROMIDE 10 MG/ML
INJECTION, SOLUTION INTRAVENOUS PRN
Status: DISCONTINUED | OUTPATIENT
Start: 2021-11-01 | End: 2021-11-01 | Stop reason: SDUPTHER

## 2021-11-01 RX ORDER — HYDRALAZINE HYDROCHLORIDE 20 MG/ML
5 INJECTION INTRAMUSCULAR; INTRAVENOUS EVERY 10 MIN PRN
Status: DISCONTINUED | OUTPATIENT
Start: 2021-11-01 | End: 2021-11-01 | Stop reason: HOSPADM

## 2021-11-01 RX ORDER — HYDROMORPHONE HCL 110MG/55ML
PATIENT CONTROLLED ANALGESIA SYRINGE INTRAVENOUS PRN
Status: DISCONTINUED | OUTPATIENT
Start: 2021-11-01 | End: 2021-11-01 | Stop reason: SDUPTHER

## 2021-11-01 RX ORDER — ACETAMINOPHEN 160 MG/5ML
650 SOLUTION ORAL EVERY 6 HOURS
Status: DISCONTINUED | OUTPATIENT
Start: 2021-11-02 | End: 2021-11-02

## 2021-11-01 RX ORDER — HEPARIN SODIUM 5000 [USP'U]/ML
5000 INJECTION, SOLUTION INTRAVENOUS; SUBCUTANEOUS ONCE
Status: COMPLETED | OUTPATIENT
Start: 2021-11-01 | End: 2021-11-01

## 2021-11-01 RX ORDER — CEFAZOLIN SODIUM 2 G/50ML
SOLUTION INTRAVENOUS PRN
Status: DISCONTINUED | OUTPATIENT
Start: 2021-11-01 | End: 2021-11-01 | Stop reason: SDUPTHER

## 2021-11-01 RX ORDER — KETOROLAC TROMETHAMINE 30 MG/ML
INJECTION, SOLUTION INTRAMUSCULAR; INTRAVENOUS PRN
Status: DISCONTINUED | OUTPATIENT
Start: 2021-11-01 | End: 2021-11-01 | Stop reason: SDUPTHER

## 2021-11-01 RX ORDER — SODIUM CHLORIDE 0.9 % (FLUSH) 0.9 %
10 SYRINGE (ML) INJECTION PRN
Status: DISCONTINUED | OUTPATIENT
Start: 2021-11-01 | End: 2021-11-03 | Stop reason: HOSPADM

## 2021-11-01 RX ORDER — MAGNESIUM SULFATE IN WATER 40 MG/ML
2000 INJECTION, SOLUTION INTRAVENOUS PRN
Status: DISCONTINUED | OUTPATIENT
Start: 2021-11-01 | End: 2021-11-03 | Stop reason: HOSPADM

## 2021-11-01 RX ORDER — LABETALOL HYDROCHLORIDE 5 MG/ML
5 INJECTION, SOLUTION INTRAVENOUS EVERY 10 MIN PRN
Status: DISCONTINUED | OUTPATIENT
Start: 2021-11-01 | End: 2021-11-01 | Stop reason: HOSPADM

## 2021-11-01 RX ORDER — FENTANYL CITRATE 50 UG/ML
INJECTION, SOLUTION INTRAMUSCULAR; INTRAVENOUS PRN
Status: DISCONTINUED | OUTPATIENT
Start: 2021-11-01 | End: 2021-11-01 | Stop reason: SDUPTHER

## 2021-11-01 RX ORDER — SODIUM CHLORIDE, SODIUM LACTATE, POTASSIUM CHLORIDE, CALCIUM CHLORIDE 600; 310; 30; 20 MG/100ML; MG/100ML; MG/100ML; MG/100ML
INJECTION, SOLUTION INTRAVENOUS CONTINUOUS
Status: DISCONTINUED | OUTPATIENT
Start: 2021-11-01 | End: 2021-11-03 | Stop reason: HOSPADM

## 2021-11-01 RX ORDER — SODIUM CHLORIDE 0.9 % (FLUSH) 0.9 %
5-40 SYRINGE (ML) INJECTION PRN
Status: DISCONTINUED | OUTPATIENT
Start: 2021-11-01 | End: 2021-11-01 | Stop reason: HOSPADM

## 2021-11-01 RX ORDER — ONDANSETRON 2 MG/ML
4 INJECTION INTRAMUSCULAR; INTRAVENOUS ONCE
Status: COMPLETED | OUTPATIENT
Start: 2021-11-01 | End: 2021-11-01

## 2021-11-01 RX ORDER — MORPHINE SULFATE 2 MG/ML
2 INJECTION, SOLUTION INTRAMUSCULAR; INTRAVENOUS EVERY 5 MIN PRN
Status: DISCONTINUED | OUTPATIENT
Start: 2021-11-01 | End: 2021-11-01 | Stop reason: HOSPADM

## 2021-11-01 RX ORDER — ONDANSETRON 2 MG/ML
4 INJECTION INTRAMUSCULAR; INTRAVENOUS EVERY 6 HOURS PRN
Status: DISCONTINUED | OUTPATIENT
Start: 2021-11-01 | End: 2021-11-03 | Stop reason: HOSPADM

## 2021-11-01 RX ORDER — SODIUM CHLORIDE 9 MG/ML
25 INJECTION, SOLUTION INTRAVENOUS PRN
Status: DISCONTINUED | OUTPATIENT
Start: 2021-11-01 | End: 2021-11-01 | Stop reason: HOSPADM

## 2021-11-01 RX ORDER — MIDAZOLAM HYDROCHLORIDE 1 MG/ML
INJECTION INTRAMUSCULAR; INTRAVENOUS PRN
Status: DISCONTINUED | OUTPATIENT
Start: 2021-11-01 | End: 2021-11-01 | Stop reason: SDUPTHER

## 2021-11-01 RX ORDER — SODIUM CHLORIDE, SODIUM LACTATE, POTASSIUM CHLORIDE, CALCIUM CHLORIDE 600; 310; 30; 20 MG/100ML; MG/100ML; MG/100ML; MG/100ML
INJECTION, SOLUTION INTRAVENOUS CONTINUOUS
Status: DISCONTINUED | OUTPATIENT
Start: 2021-11-01 | End: 2021-11-01

## 2021-11-01 RX ORDER — HYDROMORPHONE HCL 110MG/55ML
0.25 PATIENT CONTROLLED ANALGESIA SYRINGE INTRAVENOUS EVERY 5 MIN PRN
Status: DISCONTINUED | OUTPATIENT
Start: 2021-11-01 | End: 2021-11-01 | Stop reason: HOSPADM

## 2021-11-01 RX ORDER — SODIUM CHLORIDE 9 MG/ML
25 INJECTION, SOLUTION INTRAVENOUS PRN
Status: DISCONTINUED | OUTPATIENT
Start: 2021-11-01 | End: 2021-11-03 | Stop reason: HOSPADM

## 2021-11-01 RX ORDER — HYDROCODONE BITARTRATE AND ACETAMINOPHEN 5; 325 MG/1; MG/1
1 TABLET ORAL EVERY 6 HOURS PRN
Status: DISCONTINUED | OUTPATIENT
Start: 2021-11-01 | End: 2021-11-03 | Stop reason: HOSPADM

## 2021-11-01 RX ORDER — PROMETHAZINE HYDROCHLORIDE 25 MG/ML
6.25 INJECTION, SOLUTION INTRAMUSCULAR; INTRAVENOUS
Status: COMPLETED | OUTPATIENT
Start: 2021-11-01 | End: 2021-11-01

## 2021-11-01 RX ORDER — POTASSIUM CHLORIDE 20 MEQ/1
40 TABLET, EXTENDED RELEASE ORAL PRN
Status: DISCONTINUED | OUTPATIENT
Start: 2021-11-01 | End: 2021-11-03 | Stop reason: HOSPADM

## 2021-11-01 RX ORDER — LIDOCAINE HYDROCHLORIDE 20 MG/ML
INJECTION, SOLUTION INTRAVENOUS PRN
Status: DISCONTINUED | OUTPATIENT
Start: 2021-11-01 | End: 2021-11-01 | Stop reason: SDUPTHER

## 2021-11-01 RX ORDER — HYDROMORPHONE HCL 110MG/55ML
0.5 PATIENT CONTROLLED ANALGESIA SYRINGE INTRAVENOUS EVERY 5 MIN PRN
Status: DISCONTINUED | OUTPATIENT
Start: 2021-11-01 | End: 2021-11-01 | Stop reason: HOSPADM

## 2021-11-01 RX ORDER — SODIUM CHLORIDE 0.9 % (FLUSH) 0.9 %
5-40 SYRINGE (ML) INJECTION EVERY 12 HOURS SCHEDULED
Status: DISCONTINUED | OUTPATIENT
Start: 2021-11-01 | End: 2021-11-03 | Stop reason: HOSPADM

## 2021-11-01 RX ORDER — KETOROLAC TROMETHAMINE 30 MG/ML
30 INJECTION, SOLUTION INTRAMUSCULAR; INTRAVENOUS EVERY 6 HOURS
Status: COMPLETED | OUTPATIENT
Start: 2021-11-01 | End: 2021-11-02

## 2021-11-01 RX ORDER — BUPIVACAINE HYDROCHLORIDE 5 MG/ML
INJECTION, SOLUTION EPIDURAL; INTRACAUDAL
Status: COMPLETED | OUTPATIENT
Start: 2021-11-01 | End: 2021-11-01

## 2021-11-01 RX ORDER — DEXAMETHASONE SODIUM PHOSPHATE 4 MG/ML
INJECTION, SOLUTION INTRA-ARTICULAR; INTRALESIONAL; INTRAMUSCULAR; INTRAVENOUS; SOFT TISSUE PRN
Status: DISCONTINUED | OUTPATIENT
Start: 2021-11-01 | End: 2021-11-01 | Stop reason: SDUPTHER

## 2021-11-01 RX ORDER — SCOLOPAMINE TRANSDERMAL SYSTEM 1 MG/1
1 PATCH, EXTENDED RELEASE TRANSDERMAL ONCE
Status: DISCONTINUED | OUTPATIENT
Start: 2021-11-01 | End: 2021-11-01

## 2021-11-01 RX ORDER — CEFAZOLIN SODIUM 2 G/100ML
2000 INJECTION, SOLUTION INTRAVENOUS
Status: COMPLETED | OUTPATIENT
Start: 2021-11-01 | End: 2021-11-01

## 2021-11-01 RX ADMIN — SODIUM CHLORIDE: 9 INJECTION, SOLUTION INTRAVENOUS at 10:24

## 2021-11-01 RX ADMIN — FENTANYL CITRATE 25 MCG: 0.05 INJECTION, SOLUTION INTRAMUSCULAR; INTRAVENOUS at 19:02

## 2021-11-01 RX ADMIN — HYDROMORPHONE HYDROCHLORIDE 2 MG: 2 INJECTION INTRAMUSCULAR; INTRAVENOUS; SUBCUTANEOUS at 10:52

## 2021-11-01 RX ADMIN — SODIUM CHLORIDE, PRESERVATIVE FREE 10 ML: 5 INJECTION INTRAVENOUS at 08:51

## 2021-11-01 RX ADMIN — METOCLOPRAMIDE HYDROCHLORIDE 10 MG: 5 INJECTION INTRAMUSCULAR; INTRAVENOUS at 20:02

## 2021-11-01 RX ADMIN — METOCLOPRAMIDE HYDROCHLORIDE 10 MG: 5 INJECTION INTRAMUSCULAR; INTRAVENOUS at 23:01

## 2021-11-01 RX ADMIN — ROCURONIUM BROMIDE 50 MG: 10 INJECTION INTRAVENOUS at 10:30

## 2021-11-01 RX ADMIN — MORPHINE SULFATE 4 MG: 4 INJECTION INTRAVENOUS at 23:01

## 2021-11-01 RX ADMIN — CEFAZOLIN SODIUM 2000 MG: 2 SOLUTION INTRAVENOUS at 10:24

## 2021-11-01 RX ADMIN — SODIUM CHLORIDE, POTASSIUM CHLORIDE, SODIUM LACTATE AND CALCIUM CHLORIDE: 600; 310; 30; 20 INJECTION, SOLUTION INTRAVENOUS at 12:46

## 2021-11-01 RX ADMIN — CEFAZOLIN SODIUM 2000 MG: 2 INJECTION, SOLUTION INTRAVENOUS at 22:52

## 2021-11-01 RX ADMIN — PROPOFOL 200 MG: 10 INJECTION, EMULSION INTRAVENOUS at 10:14

## 2021-11-01 RX ADMIN — DEXAMETHASONE SODIUM PHOSPHATE 8 MG: 4 INJECTION, SOLUTION INTRAMUSCULAR; INTRAVENOUS at 10:30

## 2021-11-01 RX ADMIN — SODIUM CHLORIDE, POTASSIUM CHLORIDE, SODIUM LACTATE AND CALCIUM CHLORIDE: 600; 310; 30; 20 INJECTION, SOLUTION INTRAVENOUS at 08:27

## 2021-11-01 RX ADMIN — SODIUM CHLORIDE, POTASSIUM CHLORIDE, SODIUM LACTATE AND CALCIUM CHLORIDE: 600; 310; 30; 20 INJECTION, SOLUTION INTRAVENOUS at 22:51

## 2021-11-01 RX ADMIN — MORPHINE SULFATE 4 MG: 4 INJECTION INTRAVENOUS at 20:05

## 2021-11-01 RX ADMIN — MIDAZOLAM 2 MG: 1 INJECTION INTRAMUSCULAR; INTRAVENOUS at 10:09

## 2021-11-01 RX ADMIN — KETOROLAC TROMETHAMINE 15 MG: 30 INJECTION, SOLUTION INTRAMUSCULAR; INTRAVENOUS at 12:01

## 2021-11-01 RX ADMIN — LIDOCAINE HYDROCHLORIDE 100 MG: 20 INJECTION, SOLUTION INTRAVENOUS at 10:14

## 2021-11-01 RX ADMIN — HEPARIN SODIUM 5000 UNITS: 5000 INJECTION INTRAVENOUS; SUBCUTANEOUS at 08:50

## 2021-11-01 RX ADMIN — ONDANSETRON 4 MG: 2 INJECTION INTRAMUSCULAR; INTRAVENOUS at 10:30

## 2021-11-01 RX ADMIN — ONDANSETRON 4 MG: 2 INJECTION INTRAMUSCULAR; INTRAVENOUS at 08:50

## 2021-11-01 RX ADMIN — SUGAMMADEX 100 MG: 100 INJECTION, SOLUTION INTRAVENOUS at 11:55

## 2021-11-01 RX ADMIN — FENTANYL CITRATE 100 MCG: 50 INJECTION, SOLUTION INTRAMUSCULAR; INTRAVENOUS at 10:14

## 2021-11-01 RX ADMIN — KETOROLAC TROMETHAMINE 15 MG: 30 INJECTION, SOLUTION INTRAMUSCULAR; INTRAVENOUS at 11:54

## 2021-11-01 RX ADMIN — CEFAZOLIN SODIUM 2000 MG: 2 INJECTION, SOLUTION INTRAVENOUS at 08:51

## 2021-11-01 RX ADMIN — ONDANSETRON 4 MG: 2 INJECTION INTRAMUSCULAR; INTRAVENOUS at 18:58

## 2021-11-01 RX ADMIN — PROMETHAZINE HYDROCHLORIDE 6.25 MG: 25 INJECTION INTRAMUSCULAR; INTRAVENOUS at 12:44

## 2021-11-01 RX ADMIN — KETOROLAC TROMETHAMINE 30 MG: 30 INJECTION, SOLUTION INTRAMUSCULAR; INTRAVENOUS at 12:01

## 2021-11-01 RX ADMIN — Medication 100 MG: at 10:14

## 2021-11-01 ASSESSMENT — PULMONARY FUNCTION TESTS
PIF_VALUE: 0
PIF_VALUE: 2
PIF_VALUE: 24
PIF_VALUE: 22
PIF_VALUE: 24
PIF_VALUE: 1
PIF_VALUE: 24
PIF_VALUE: 1
PIF_VALUE: 24
PIF_VALUE: 24
PIF_VALUE: 22
PIF_VALUE: 1
PIF_VALUE: 22
PIF_VALUE: 24
PIF_VALUE: 24
PIF_VALUE: 15
PIF_VALUE: 22
PIF_VALUE: 22
PIF_VALUE: 20
PIF_VALUE: 24
PIF_VALUE: 2
PIF_VALUE: 2
PIF_VALUE: 24
PIF_VALUE: 15
PIF_VALUE: 24
PIF_VALUE: 24
PIF_VALUE: 19
PIF_VALUE: 22
PIF_VALUE: 18
PIF_VALUE: 20
PIF_VALUE: 20
PIF_VALUE: 7
PIF_VALUE: 24
PIF_VALUE: 22
PIF_VALUE: 18
PIF_VALUE: 24
PIF_VALUE: 20
PIF_VALUE: 14
PIF_VALUE: 24
PIF_VALUE: 18
PIF_VALUE: 22
PIF_VALUE: 24
PIF_VALUE: 23
PIF_VALUE: 24
PIF_VALUE: 22
PIF_VALUE: 24
PIF_VALUE: 18
PIF_VALUE: 24
PIF_VALUE: 22
PIF_VALUE: 24
PIF_VALUE: 24
PIF_VALUE: 15
PIF_VALUE: 1
PIF_VALUE: 24
PIF_VALUE: 21
PIF_VALUE: 24
PIF_VALUE: 14
PIF_VALUE: 15
PIF_VALUE: 22
PIF_VALUE: 24
PIF_VALUE: 49
PIF_VALUE: 24
PIF_VALUE: 24
PIF_VALUE: 3
PIF_VALUE: 24
PIF_VALUE: 18
PIF_VALUE: 23
PIF_VALUE: 24
PIF_VALUE: 2
PIF_VALUE: 25
PIF_VALUE: 24
PIF_VALUE: 24
PIF_VALUE: 18
PIF_VALUE: 18
PIF_VALUE: 24
PIF_VALUE: 24
PIF_VALUE: 7
PIF_VALUE: 22
PIF_VALUE: 22
PIF_VALUE: 24
PIF_VALUE: 0
PIF_VALUE: 24
PIF_VALUE: 25
PIF_VALUE: 24
PIF_VALUE: 15
PIF_VALUE: 22
PIF_VALUE: 34
PIF_VALUE: 1
PIF_VALUE: 24
PIF_VALUE: 22
PIF_VALUE: 1
PIF_VALUE: 1
PIF_VALUE: 20
PIF_VALUE: 24
PIF_VALUE: 22
PIF_VALUE: 24
PIF_VALUE: 0
PIF_VALUE: 24
PIF_VALUE: 22
PIF_VALUE: 0
PIF_VALUE: 24
PIF_VALUE: 1
PIF_VALUE: 4
PIF_VALUE: 22
PIF_VALUE: 24
PIF_VALUE: 22
PIF_VALUE: 24
PIF_VALUE: 23
PIF_VALUE: 15
PIF_VALUE: 17
PIF_VALUE: 24
PIF_VALUE: 15
PIF_VALUE: 0
PIF_VALUE: 24
PIF_VALUE: 24

## 2021-11-01 ASSESSMENT — PAIN DESCRIPTION - ORIENTATION: ORIENTATION: RIGHT;LEFT;ANTERIOR

## 2021-11-01 ASSESSMENT — PAIN DESCRIPTION - PROGRESSION
CLINICAL_PROGRESSION: NOT CHANGED
CLINICAL_PROGRESSION: GRADUALLY WORSENING

## 2021-11-01 ASSESSMENT — PAIN DESCRIPTION - LOCATION
LOCATION: ABDOMEN
LOCATION: ABDOMEN

## 2021-11-01 ASSESSMENT — PAIN SCALES - GENERAL
PAINLEVEL_OUTOF10: 7
PAINLEVEL_OUTOF10: 6
PAINLEVEL_OUTOF10: 7
PAINLEVEL_OUTOF10: 8

## 2021-11-01 ASSESSMENT — PAIN DESCRIPTION - ONSET
ONSET: ON-GOING
ONSET: ON-GOING

## 2021-11-01 ASSESSMENT — LIFESTYLE VARIABLES: SMOKING_STATUS: 0

## 2021-11-01 ASSESSMENT — PAIN DESCRIPTION - PAIN TYPE
TYPE: SURGICAL PAIN
TYPE: SURGICAL PAIN

## 2021-11-01 ASSESSMENT — PAIN DESCRIPTION - FREQUENCY
FREQUENCY: CONTINUOUS
FREQUENCY: CONTINUOUS

## 2021-11-01 ASSESSMENT — PAIN DESCRIPTION - DESCRIPTORS: DESCRIPTORS: ACHING;STABBING

## 2021-11-01 NOTE — OP NOTE
EXAMINATION TYPE: Axial Bone Density

 

DATE OF EXAM: 5/23/2018

 

COMPARISON: NONE

 

CLINICAL HISTORY: Z78.0 POST MENOPAUSAL

 

 

Height:  64

Weight:  229.5

 

FRAX RISK QUESTIONS:

Alcohol (3 or more units per day):  no

Family History (Parent hip fracture):  no

Glucocorticoids (More than 3mos): no

           (Ex: prednisone, prednisolone, methylprednisolone, dexamethasone, and hydrocortisone).    
     

History of Fracture in Adulthood: no

Secondary Osteoporosis:

  1.  Type 1 Diabetes: no

  2.  Hyperthyroidism: no

  3.  Menopause before 45: yes

  4.  Malnutrition: no

  5.  Chronic liver disease: no

Rheumatoid Arthritis: no

Current Tobacco Use: no

 

RISK FACTORS 

HISTORY OF: 

Family History of Osteoporosis: yes

Active: yes

Diet low in dairy products/other sources of calcium:  no

Postmenopausal woman: total hysterectomy AGE 41

Lost more than 2 inches in height since high school: no

 

 

MEDICATIONS: blood pressure, lipitor, metformin, abilify

 

 

Additional History:

 

 

EXAM MEASUREMENTS: 

Bone mineral densitometry was performed using the "[employer] System.

Bone mineral density as measured about the Lumbar spine is:  

----- L1-L4(G/cm2): 1.343

T Score Values are as follows:

----- L2: 0.9

----- L3: 2.1

----- L4: 1.9

----- L1-L4: 1.4

Bone mineral density has: increased 4.9 % since study of: 9.11.2003

 

Bone mineral density about the R hip (g/cm2): 0.984

Bone mineral density about the L hip (g/cm2): 0.968

T Score values are as follows:

-----R Neck: -0.4

-----L Neck: -0.5

-----R Total: 0.2

-----L Total: 0.7

Bone mineral density has: decreased -1.3 % since study of: 9.11.2003

 

 

IMPRESSION:

No evidence for osteoporosis or osteopenia.

 

 

 

 

 

NOTE:  T-SCORE=SD OF THE YOUNG ADULT MEAN. Operative Report    Patient: Elpidio Thao  YOB: 1990  MRN: [id number]    Date of Surgery:  11/01/21    Surgeon:  Sai Espinal MD, FACS    Assistant(s):  Irina Agee, [address] [place] [address] skilled assistance of the CNP was necessary for the successful completion of this case. She was essential for the proper positioning, manipulation of instruments, proper exposure, manipulation of tissue, and wound closure. Preoperative Diagnosis: 1. Morbid obesity    Postoperative Diagnosis:  1. Same as above    Procedure(s) Performed:  1. Robotic-Assisted Laparoscopic Sleeve Gastrectomy    IVF: 800 mL crystalloid    Estimated Blood Loss: 25 mL    Anesthesia: General endotracheal    Specimen(s):  1. Sleeve stomach    Drain(s): None    Findings: Consistent with postoperative diagnosis    Complications: None apparent    Indication for Procedure: This patient is a 28 y.o. female with a history of morbid obesity. The patient currently has a BMI of 40.85. The patient attended an informational seminar for bariatric surgery, was determined to be an appropriate candidate, and underwent an extensive workup prior to being approved for surgery. The patient made the decision to undergo the above listed procedure(s). All of the patient's questions related to the surgery--preoperative, operative, and postoperative--were answered previously. An informed consent was obtained after discussing in detail with the patient the risks, benefits, and alternatives to surgery. Description of Procedure: On 11/01/21, the patient was brought to the operating room from the preoperative holding area. In the preoperative holding area, the patient received heparin. The patient was placed on the operating room table in the supine position. The patient was secured to the operating room table using multiple straps and a foot board. All pressure points were well-padded.     The patient was intubated endotracheally after SCD boots were placed bilaterally. The patient received IV antibiotics in accordance with national protocol. The abdomen was then prepped and draped in a standard fashion. An approved timeout was held with all members of the operating room team present and in agreement. Using an #11 blade, a 5 mm incision was made in the left lower mid abdomen. The abdomen was entered under direct vision using a 5 mm 0 degree laparoscope housed in a 5 mm optical trocar. Pneumoperitoneum was established using CO2 to 15 mm Hg. Once inside the abdominal cavity, an inspection ensued. The remaining ports were placed under direct vision. The ports placed were the following sizes and locations: three 8 mm ports across the mid abdomen in a lateral orientation, the initial 5 mm port was upsized to an 8 mm port, a 12 mm assistant port in the right lower mid abdomen, and a 5 mm liver retractor port in the subxiphoid location. There was no injury to any intra-abdominal structures during port placement. The robot was brought to the operating room table on the patient's left side and docked. The patient was placed in the steep reverse Trendelenburg. A liver retractor was secured to the table and passed into the abdominal cavity. The liver was retracted cephalad and anteriorly, exposing the esophageal hiatus. The anterior gastric fat pad was identified. The angle of His was bluntly mobilized. The esophageal hiatus was inspected and it was determined that there was no evidence of an hiatal hernia. Attention was directed toward the pylorus. The vein of ram was used to confirm the position. Approximately six centimeters proximal to the pylorus, the vessel sealer was used to divide the gastrocolic omentum. The lesser sac was opened and the division of this omentum was carried up all the way to the angle of His. The short gastrics were identified and carefully cauterized, using the vessel sealer.      After completing the division of the greater curvature attachments, a 38 [place]ian bougie was passed toward the pylorus. Sequential 60mm firings of a da [place] SureForm stapler were performed (2 green and 5 blue), taking care not to overly narrow the pouch, specifically at the incisura. At the angle of His, an outpuching of tissue was left to ensure that the staple line did not encroach on the esophagus. The outpouching of the gastric sleeve at the cardia was imbricated and the entire staple line was oversewn. An intraoperative leak test was performed which demonstrated no leak. The robot was undocked and moved away from the operating room table. The 12 mm port site was then widened with a Yenny clamp and the gastric sleeve specimen was removed intact and passed off for permanent pathology. The extraction site was closed with several trans-fascial 0-Vicryl sutures. All skin incisions were closed using a 4-0 Vicryl in a subcuticular fashion. Dermabond was used to cover the incisions. The patient was extubated and moved to the recovery room in stable condition. At the end of the case, the needle, instrument, and sponge counts were correct x 2. Dr. Jacob Hardy was present, scrubbed, and supervised the entire case. Dr. Jacob Hardy personally informed the family of the outcome of the procedure.     Sammy Delgado MD

## 2021-11-01 NOTE — PROGRESS NOTES
1220: Patient arrived to PACU from OR. Monitors applied, alarms on. Surgical sites are clean, dry and intact x5 sites. Report obtained from [facility] and [facility]. 1240: Patient repositioned in bed. Tolerated well, complaining of some nausea at this time. Patient medicated for nausea. 1300: Patient resting in bed at this time. Breathing even and unlabored. 2L O2 nasal cannula in place. Patient placed in pacu holding pattern while waiting on room assignment. 1430: Patient assisted up to bathroom, ambulated around unit. Tolerated very well.   1500: Patient remains in pacu hold while waiting for room assignment, patient is up ambulating around unit. Stand by assist. Tolerating very well. 1740: Patient ambulated in unit to bathroom. Tolerated very well. 1815: Patient assigned to room 1123. Attempted to call report to RN, no answer at this time. 1845: Report given to [facility]. 2030: Room 1123 ready. Patient transferred to floor.

## 2021-11-01 NOTE — PROGRESS NOTES
1845: Waiting on room 1123 to become available. Report given to [facility] for room 1123.

## 2021-11-01 NOTE — H&P
History and Physical Update    Original H&P done in office on 10/21/2021 (less than 30 days ago). Pt reports the following changes in health since being seen last: None  - Did okay on liver shrinking diet. Down -9 lbs  - Covid negative on 10/25/2021    Vitals:    11/01/21 0805   BP: (!) 115/59   Pulse: 64   Resp: 16   Temp: 96.2 °F (35.7 °C)   SpO2: 92%       Alert and oriented x 3, no apparent distress at rest  Atraumatic, normocephalic. EOMI. Breathing unlabored. RRR. Soft, non-tender, non-distended. Moves all extremities. Warm, dry. Tiara Fernandes is a 27 yo female here today for elective bariatric surgery    -Consent obtained in office. -Abx ordered in office.  -Reviewed expected pre-operative, operative, and post-operative courses. -Answered questions to patient's satisfaction.   -Reviewed risks, benefits, alternative to procedure.   -Proceed as scheduled. -The patient was counseled at length about the risks of ingris Covid-19 during their perioperative period and any recovery window from their procedure. The patient was made aware that ingris Covid-19  may worsen their prognosis for recovering from their procedure  and lend to a higher morbidity and/or mortality risk. All material risks, benefits, and reasonable alternatives including postponing the procedure were discussed. The patient does wish to proceed with the procedure at this time.         CARLEE Knox - CNP

## 2021-11-02 LAB
ALBUMIN SERPL-MCNC: 4 GM/DL (ref 3.4–5)
ALP BLD-CCNC: 63 IU/L (ref 40–128)
ALT SERPL-CCNC: 32 U/L (ref 10–40)
ANION GAP SERPL CALCULATED.3IONS-SCNC: 11 MMOL/L (ref 4–16)
AST SERPL-CCNC: 26 IU/L (ref 15–37)
BASOPHILS ABSOLUTE: 0 K/CU MM
BASOPHILS RELATIVE PERCENT: 0.1 % (ref 0–1)
BILIRUB SERPL-MCNC: 0.3 MG/DL (ref 0–1)
BUN BLDV-MCNC: 11 MG/DL (ref 6–23)
CALCIUM SERPL-MCNC: 9.1 MG/DL (ref 8.3–10.6)
CHLORIDE BLD-SCNC: 102 MMOL/L (ref 99–110)
CO2: 26 MMOL/L (ref 21–32)
CREAT SERPL-MCNC: 0.8 MG/DL (ref 0.6–1.1)
DIFFERENTIAL TYPE: ABNORMAL
EOSINOPHILS ABSOLUTE: 0 K/CU MM
EOSINOPHILS RELATIVE PERCENT: 0.1 % (ref 0–3)
GFR AFRICAN AMERICAN: >60 ML/MIN/1.73M2
GFR NON-AFRICAN AMERICAN: >60 ML/MIN/1.73M2
GLUCOSE BLD-MCNC: 111 MG/DL (ref 70–99)
GLUCOSE BLD-MCNC: 119 MG/DL (ref 70–99)
GLUCOSE BLD-MCNC: 119 MG/DL (ref 70–99)
GLUCOSE BLD-MCNC: 148 MG/DL (ref 70–99)
HCT VFR BLD CALC: 37.2 % (ref 37–47)
HEMOGLOBIN: 11.7 GM/DL (ref 12.5–16)
IMMATURE NEUTROPHIL %: 0.6 % (ref 0–0.43)
LYMPHOCYTES ABSOLUTE: 1.7 K/CU MM
LYMPHOCYTES RELATIVE PERCENT: 15.9 % (ref 24–44)
MCH RBC QN AUTO: 28.3 PG (ref 27–31)
MCHC RBC AUTO-ENTMCNC: 31.5 % (ref 32–36)
MCV RBC AUTO: 90.1 FL (ref 78–100)
MONOCYTES ABSOLUTE: 0.6 K/CU MM
MONOCYTES RELATIVE PERCENT: 5.6 % (ref 0–4)
NUCLEATED RBC %: 0 %
PDW BLD-RTO: 14.2 % (ref 11.7–14.9)
PLATELET # BLD: 235 K/CU MM (ref 140–440)
PMV BLD AUTO: 10.4 FL (ref 7.5–11.1)
POTASSIUM SERPL-SCNC: 4.1 MMOL/L (ref 3.5–5.1)
RBC # BLD: 4.13 M/CU MM (ref 4.2–5.4)
SEGMENTED NEUTROPHILS ABSOLUTE COUNT: 8.4 K/CU MM
SEGMENTED NEUTROPHILS RELATIVE PERCENT: 77.7 % (ref 36–66)
SODIUM BLD-SCNC: 139 MMOL/L (ref 135–145)
TOTAL IMMATURE NEUTOROPHIL: 0.07 K/CU MM
TOTAL NUCLEATED RBC: 0 K/CU MM
TOTAL PROTEIN: 6.3 GM/DL (ref 6.4–8.2)
WBC # BLD: 10.8 K/CU MM (ref 4–10.5)

## 2021-11-02 PROCEDURE — 2500000003 HC RX 250 WO HCPCS: Performed by: SURGERY

## 2021-11-02 PROCEDURE — 6370000000 HC RX 637 (ALT 250 FOR IP): Performed by: SURGERY

## 2021-11-02 PROCEDURE — 82962 GLUCOSE BLOOD TEST: CPT

## 2021-11-02 PROCEDURE — 2580000003 HC RX 258: Performed by: SURGERY

## 2021-11-02 PROCEDURE — 1200000000 HC SEMI PRIVATE

## 2021-11-02 PROCEDURE — 94761 N-INVAS EAR/PLS OXIMETRY MLT: CPT

## 2021-11-02 PROCEDURE — 80053 COMPREHEN METABOLIC PANEL: CPT

## 2021-11-02 PROCEDURE — 36415 COLL VENOUS BLD VENIPUNCTURE: CPT

## 2021-11-02 PROCEDURE — 99024 POSTOP FOLLOW-UP VISIT: CPT | Performed by: SURGERY

## 2021-11-02 PROCEDURE — 6360000002 HC RX W HCPCS: Performed by: SURGERY

## 2021-11-02 PROCEDURE — 94150 VITAL CAPACITY TEST: CPT

## 2021-11-02 PROCEDURE — 85025 COMPLETE CBC W/AUTO DIFF WBC: CPT

## 2021-11-02 RX ORDER — OMEPRAZOLE 20 MG/1
40 CAPSULE, DELAYED RELEASE ORAL
Status: DISCONTINUED | OUTPATIENT
Start: 2021-11-03 | End: 2021-11-03 | Stop reason: CLARIF

## 2021-11-02 RX ORDER — HYDROCODONE BITARTRATE AND ACETAMINOPHEN 5; 325 MG/1; MG/1
1 TABLET ORAL EVERY 6 HOURS PRN
Qty: 20 TABLET | Refills: 0 | Status: SHIPPED | OUTPATIENT
Start: 2021-11-02 | End: 2021-11-07

## 2021-11-02 RX ORDER — METOCLOPRAMIDE 10 MG/1
10 TABLET ORAL 3 TIMES DAILY PRN
Qty: 30 TABLET | Refills: 0 | Status: ON HOLD | OUTPATIENT
Start: 2021-11-02 | End: 2021-12-16 | Stop reason: HOSPADM

## 2021-11-02 RX ORDER — SENNA PLUS 8.6 MG/1
1 TABLET ORAL 2 TIMES DAILY
Qty: 60 TABLET | Refills: 0 | Status: SHIPPED | OUTPATIENT
Start: 2021-11-02 | End: 2021-12-02

## 2021-11-02 RX ORDER — ONDANSETRON 4 MG/1
4 TABLET, FILM COATED ORAL EVERY 8 HOURS PRN
Qty: 20 TABLET | Refills: 1 | Status: SHIPPED | OUTPATIENT
Start: 2021-11-02 | End: 2021-11-09

## 2021-11-02 RX ORDER — ACETAMINOPHEN 325 MG/1
650 TABLET ORAL EVERY 6 HOURS
Status: DISCONTINUED | OUTPATIENT
Start: 2021-11-02 | End: 2021-11-03 | Stop reason: HOSPADM

## 2021-11-02 RX ORDER — SCOLOPAMINE TRANSDERMAL SYSTEM 1 MG/1
1 PATCH, EXTENDED RELEASE TRANSDERMAL
Status: DISCONTINUED | OUTPATIENT
Start: 2021-11-02 | End: 2021-11-03 | Stop reason: HOSPADM

## 2021-11-02 RX ADMIN — KETOROLAC TROMETHAMINE 30 MG: 30 INJECTION, SOLUTION INTRAMUSCULAR; INTRAVENOUS at 17:40

## 2021-11-02 RX ADMIN — METOCLOPRAMIDE HYDROCHLORIDE 10 MG: 5 INJECTION INTRAMUSCULAR; INTRAVENOUS at 11:35

## 2021-11-02 RX ADMIN — SODIUM CHLORIDE, PRESERVATIVE FREE 10 ML: 5 INJECTION INTRAVENOUS at 23:00

## 2021-11-02 RX ADMIN — KETOROLAC TROMETHAMINE 30 MG: 30 INJECTION, SOLUTION INTRAMUSCULAR; INTRAVENOUS at 00:42

## 2021-11-02 RX ADMIN — HYDROCODONE BITARTRATE AND ACETAMINOPHEN 1 TABLET: 5; 325 TABLET ORAL at 20:21

## 2021-11-02 RX ADMIN — KETOROLAC TROMETHAMINE 30 MG: 30 INJECTION, SOLUTION INTRAMUSCULAR; INTRAVENOUS at 05:43

## 2021-11-02 RX ADMIN — CEFAZOLIN SODIUM 2000 MG: 2 INJECTION, SOLUTION INTRAVENOUS at 10:39

## 2021-11-02 RX ADMIN — FAMOTIDINE 20 MG: 10 INJECTION, SOLUTION INTRAVENOUS at 00:42

## 2021-11-02 RX ADMIN — KETOROLAC TROMETHAMINE 30 MG: 30 INJECTION, SOLUTION INTRAMUSCULAR; INTRAVENOUS at 23:10

## 2021-11-02 RX ADMIN — SODIUM CHLORIDE, POTASSIUM CHLORIDE, SODIUM LACTATE AND CALCIUM CHLORIDE: 600; 310; 30; 20 INJECTION, SOLUTION INTRAVENOUS at 17:35

## 2021-11-02 RX ADMIN — FAMOTIDINE 20 MG: 10 INJECTION, SOLUTION INTRAVENOUS at 10:37

## 2021-11-02 RX ADMIN — ACETAMINOPHEN 650 MG: 325 TABLET ORAL at 19:10

## 2021-11-02 RX ADMIN — SODIUM CHLORIDE, PRESERVATIVE FREE 10 ML: 5 INJECTION INTRAVENOUS at 11:36

## 2021-11-02 RX ADMIN — METOCLOPRAMIDE HYDROCHLORIDE 10 MG: 5 INJECTION INTRAMUSCULAR; INTRAVENOUS at 05:43

## 2021-11-02 RX ADMIN — ACETAMINOPHEN 650 MG: 325 TABLET ORAL at 23:10

## 2021-11-02 RX ADMIN — ONDANSETRON 4 MG: 2 INJECTION INTRAMUSCULAR; INTRAVENOUS at 15:48

## 2021-11-02 RX ADMIN — KETOROLAC TROMETHAMINE 30 MG: 30 INJECTION, SOLUTION INTRAMUSCULAR; INTRAVENOUS at 11:34

## 2021-11-02 RX ADMIN — ENOXAPARIN SODIUM 40 MG: 100 INJECTION SUBCUTANEOUS at 00:41

## 2021-11-02 RX ADMIN — ENOXAPARIN SODIUM 40 MG: 100 INJECTION SUBCUTANEOUS at 11:27

## 2021-11-02 RX ADMIN — ACETAMINOPHEN 650 MG: 650 SOLUTION ORAL at 12:58

## 2021-11-02 RX ADMIN — ONDANSETRON 4 MG: 2 INJECTION INTRAMUSCULAR; INTRAVENOUS at 08:45

## 2021-11-02 RX ADMIN — ENOXAPARIN SODIUM 40 MG: 100 INJECTION SUBCUTANEOUS at 20:11

## 2021-11-02 RX ADMIN — MORPHINE SULFATE 2 MG: 2 INJECTION, SOLUTION INTRAMUSCULAR; INTRAVENOUS at 19:10

## 2021-11-02 RX ADMIN — SODIUM CHLORIDE, PRESERVATIVE FREE 10 ML: 5 INJECTION INTRAVENOUS at 00:43

## 2021-11-02 RX ADMIN — SODIUM CHLORIDE, POTASSIUM CHLORIDE, SODIUM LACTATE AND CALCIUM CHLORIDE: 600; 310; 30; 20 INJECTION, SOLUTION INTRAVENOUS at 08:39

## 2021-11-02 RX ADMIN — METOCLOPRAMIDE HYDROCHLORIDE 10 MG: 5 INJECTION INTRAMUSCULAR; INTRAVENOUS at 17:41

## 2021-11-02 RX ADMIN — CEFAZOLIN SODIUM 2000 MG: 2 INJECTION, SOLUTION INTRAVENOUS at 03:38

## 2021-11-02 ASSESSMENT — PAIN SCALES - GENERAL
PAINLEVEL_OUTOF10: 8
PAINLEVEL_OUTOF10: 7
PAINLEVEL_OUTOF10: 5
PAINLEVEL_OUTOF10: 6
PAINLEVEL_OUTOF10: 6
PAINLEVEL_OUTOF10: 4
PAINLEVEL_OUTOF10: 7
PAINLEVEL_OUTOF10: 4

## 2021-11-02 ASSESSMENT — PAIN DESCRIPTION - PROGRESSION
CLINICAL_PROGRESSION: GRADUALLY IMPROVING
CLINICAL_PROGRESSION: NOT CHANGED
CLINICAL_PROGRESSION: NOT CHANGED

## 2021-11-02 ASSESSMENT — PAIN DESCRIPTION - FREQUENCY: FREQUENCY: CONTINUOUS

## 2021-11-02 ASSESSMENT — PAIN DESCRIPTION - LOCATION: LOCATION: ABDOMEN

## 2021-11-02 ASSESSMENT — PAIN DESCRIPTION - ONSET: ONSET: ON-GOING

## 2021-11-02 ASSESSMENT — PAIN DESCRIPTION - ORIENTATION: ORIENTATION: RIGHT;LEFT;ANTERIOR

## 2021-11-02 ASSESSMENT — PAIN DESCRIPTION - PAIN TYPE: TYPE: SURGICAL PAIN

## 2021-11-02 NOTE — PROGRESS NOTES
GENERAL SURGERY INPATIENT POST-OP NOTE  [facility]    PATIENT: Topher Woodruff, 28 y.o., female, MRN: [id number]    Hospital Day:  LOS: 1 day , Post-Op Day: 1 Day Post-Op    Procedure(s): 21 (Dr. Chantale Palafox) Robotic-Assisted Laparoscopic Sleeve Gastrectomy    Topher Woodruff is a 28 y.o. female who presented with morbid obesity for scheduled surgery             Subjective:  Chief Complaint: abdominal pain  Pain: 7/10  BM: none since surgery   Diet: BARIATRIC DIET; Bariatric Clear Liquid  Activity: as tolerated    She had some emesis in PACU which was bloody, but none since. She has had nausea and the only thing which has been working is the reglan. Mobilizing ok. Having upper abdominal pain. Objective:    Vitals: /80   Pulse 60   Temp 98 °F (36.7 °C) (Oral)   Resp 16   Ht 5' 5\" (1.651 m)   Wt 241 lb (109.3 kg)   LMP 10/01/2021 Comment: negative urine pregnancy  SpO2 100%   BMI 40.10 kg/m²   Vital Signs (Last 24 Hours)  Temp  Av.5 °F (35.8 °C)  Min: 82.3 °F (27.9 °C)  Max: 98.8 °F (37.1 °C)  Pulse  Av.2  Min: 54  Max: 99  BP  Min: 91/63  Max: 139/87  Resp  Av.8  Min: 1  Max: 33  SpO2  Av.7 %  Min: 92 %  Max: 100 %  Wt Readings from Last 3 Encounters:   10/25/21 241 lb (109.3 kg)   10/29/21 245 lb 8 oz (111.4 kg)   10/25/21 243 lb 4.8 oz (110.4 kg)       I/O:  0701 -  07  In: 5994 [P.O.:236;  I.V.:3050]  Out: 25     IV Fluids: sodium chloride    lactated ringers Last Rate: 125 mL/hr at 21 3021    Scheduled Meds:   ketorolac, 30 mg, IntraVENous, Q6H    sodium chloride flush, 5-40 mL, IntraVENous, 2 times per day    ceFAZolin, 2,000 mg, IntraVENous, Q8H    acetaminophen, 650 mg, Oral, Q6H    famotidine (PEPCID) injection, 20 mg, IntraVENous, BID    enoxaparin, 40 mg, SubCUTAneous, 2 times per day    Physical Exam:  General Appearance:   Alert, cooperative, no distress    Head:   Normocephalic, atraumatic    Lungs:    Equal chest rise, respirations unlabored   Heart:   Regular rate and rhythm    Abdomen:    Soft, appropriately tender, no rebound or guarding   Incisions C/D/I   Extremities:  No cyanosis or edema    Neurologic:  Nonfocal, grossly intact        Labs/Imaging Results:   Recent Results (from the past 24 hour(s))   POC Pregnancy Urine Qual    Collection Time: 11/01/21  8:43 AM   Result Value Ref Range    Preg Test, Ur NEGATIVE NEGATIVE   POCT Glucose    Collection Time: 11/01/21 12:22 PM   Result Value Ref Range    POC Glucose 108 (H) 70 - 99 MG/DL   POCT Glucose    Collection Time: 11/01/21  8:43 PM   Result Value Ref Range    POC Glucose 161 (H) 70 - 99 MG/DL   POCT Glucose    Collection Time: 11/02/21  6:44 AM   Result Value Ref Range    POC Glucose 148 (H) 70 - 99 MG/DL         Assessment:  Cheryle Ebbing is a 28 y.o. female who is post-op day #1 Day Post-Op 11/1/21 (Dr. Gillian Soni) Robotic-Assisted Laparoscopic Sleeve Gastrectomy . Active Problems: Morbid obesity ([address]  Resolved Problems:    * No resolved hospital problems. *      Plan:  · Discussed findings and options with Cheryle Ebbing. · Continue symptomatic management for pain and nausea  · Increase PO intake as able, currently on bariatric clears. Will ADAT to bariatric fulls and stay on fulls for 2 weeks postop. · IS  · Continue mobilizing  · Labs pending  · Will see how she does throughout the day, if well later than maybe home.      Electronically signed: Caty Chou MD 11/2/2021 8:18 AM

## 2021-11-02 NOTE — PLAN OF CARE
Problem: Pain:  Goal: Pain level will decrease  Description: Pain level will decrease  11/2/2021 0007 by Sai Mcfarlane RN  Outcome: Ongoing  11/2/2021 0006 by Sai Mcfarlane RN  Outcome: Ongoing  11/1/2021 2357 by Sai Mcfarlane RN  Outcome: Ongoing  11/1/2021 2357 by Sia Mcfarlane RN  Outcome: Ongoing  Goal: Control of acute pain  Description: Control of acute pain  11/2/2021 0007 by Sai Mcfarlane RN  Outcome: Ongoing  11/2/2021 0006 by Sai Mcfarlane RN  Outcome: Ongoing  11/1/2021 2357 by Sai Mcfarlane RN  Outcome: Ongoing  11/1/2021 2357 by Sai Mcfarlane RN  Outcome: Ongoing  Goal: Control of chronic pain  Description: Control of chronic pain  11/2/2021 0007 by Sai Mcfarlane RN  Outcome: Ongoing  11/2/2021 0006 by Sai Mcfarlane RN  Outcome: Ongoing  11/1/2021 2357 by Sai Mcfarlane RN  Outcome: Ongoing  11/1/2021 2357 by Sai Mcfarlane RN  Outcome: Ongoing

## 2021-11-02 NOTE — ANESTHESIA POSTPROCEDURE EVALUATION
Department of Anesthesiology  Postprocedure Note    Patient: Christiano Burciaga  MRN: [id number]  YOB: 1990  Date of evaluation: 11/1/2021  Time:  8:43 PM     Procedure Summary     Date: 11/01/21 Room / Location: [address] [place] [address]    Anesthesia Start: 1009 Anesthesia Stop: 2266    Procedure: GASTRECTOMY SLEEVE LAPAROSCOPIC ROBOTIC (N/A Abdomen) Diagnosis:       Morbid obesity ([address]      (Morbid obesity ([facility] [address] [E66.01])    Surgeons: Sal Thompson MD Responsible Provider: Jung Aguiar MD    Anesthesia Type: general ASA Status: 2          Anesthesia Type: general    Dejah Phase I: Dejah Score: 8    Dejah Phase II:      Last vitals: Reviewed and per EMR flowsheets.        Anesthesia Post Evaluation    Patient location during evaluation: PACU  Patient participation: complete - patient participated  Level of consciousness: awake and alert  Pain score: 2  Airway patency: patent  Nausea & Vomiting: no vomiting and nausea  Complications: no  Cardiovascular status: blood pressure returned to baseline  Respiratory status: acceptable  Hydration status: euvolemic

## 2021-11-02 NOTE — PLAN OF CARE
Problem: Pain:  Goal: Pain level will decrease  Description: Pain level will decrease  11/2/2021 0006 by Mahendra Edwards RN  Outcome: Ongoing  11/1/2021 2357 by Mahendra Edwards RN  Outcome: Ongoing  11/1/2021 2357 by Mahendra Edwards RN  Outcome: Ongoing  Goal: Control of acute pain  Description: Control of acute pain  11/2/2021 0006 by Mahendra Edwards RN  Outcome: Ongoing  11/1/2021 2357 by Mahendra Edwards RN  Outcome: Ongoing  11/1/2021 2357 by Mahendra Edwards RN  Outcome: Ongoing  Goal: Control of chronic pain  Description: Control of chronic pain  11/2/2021 0006 by Mahendra Edwards RN  Outcome: Ongoing  11/1/2021 2357 by Mahendra Edwards RN  Outcome: Ongoing  11/1/2021 2357 by Mahendra Edwards RN  Outcome: Ongoing

## 2021-11-02 NOTE — CONSULTS
Hospital Medicine History & Physical      Name:  Claudia Lan /Age/Sex: 1990  (28 y.o. female)   MRN & CSN:  [id number] & [id number] Admission Date/Time: 2021  7:49 AM   Location:  [facility] PCP: Ivon Diaz PA-C       Date of Admission: 2021    Chief Complaint: Abdominal pain    History of Present Illness: The patient is a 28 y.o. female with a history of GERD presented on 21 for elective bariatric surgery, performed by  [facility]. Hospitalist group was consulted for medical management post-op. Patient states she is having pain around surgical incision sites. She is also nauseated and has had some vomiting. She's been able to tolerate some oral food and liquid. She is passing flatus but no BM yet. Patient's past medical, social, and family history have been reviewed. Ten point ROS: reviewed negative, unless as noted in above HPI. Past Medical History:        Diagnosis Date    Depression     Diabetes mellitus ([address]     type 2-diet controlled. \"had gestational diabetes and my hbgA1c was 6.1- off medications for the past 2 yrs now just diet controlled    GERD (gastroesophageal reflux disease)     Group B streptococcal infection during pregnancy      (normal spontaneous vaginal delivery) 7-2-15    delivered in ambulance 12 minutes    Obesity, unspecified     Pancreatitis chronic     Problems with hearing     dr Olita Schirmer; only due to when she had a cold       Past Surgical History:        Procedure Laterality Date    ENDOSCOPY, COLON, DIAGNOSTIC      HEMORRHOID SURGERY      x3- last time 2019    TUBAL LIGATION  2015    UPPER GASTROINTESTINAL ENDOSCOPY N/A 2021    EGD BIOPSY performed by Sukhdev Breen MD at [address] [place] [address],Fourth Floor      reconstruction between vag/anal area from childbirth       Medications Prior to Admission:    Prior to Admission medications    Medication Sig Start Date End Date Taking?  Authorizing Provider HYDROcodone-acetaminophen (NORCO) 5-325 MG per tablet Take 1 tablet by mouth every 6 hours as needed for Pain for up to 5 days. Intended supply: 5 days. Take lowest dose possible to manage pain 11/2/21 11/7/21 Yes Yoselin Tee MD   [facility]) 8.6 MG tablet Take 1 tablet by mouth 2 times daily 11/2/21 12/2/21 Yes Yoselin Tee MD   ondansetron [facility]) 4 MG tablet Take 1 tablet by mouth every 8 hours as needed for Nausea or Vomiting 11/2/21 11/9/21 Yes Yoselin Tee MD   metoclopramide (REGLAN) 10 MG tablet Take 1 tablet by mouth 3 times daily as needed (nausea or vomiting) 11/2/21  Yes Yoselin Tee MD   omeprazole (PRILOSEC) 40 MG delayed release capsule Take 1 capsule by mouth every morning (before breakfast) 7/21/21  Yes Jere Ballard MD       Allergies:    Wool alcohol [lanolin]    Social History:    TOBACCO:   reports that she quit smoking about 8 months ago. Her smoking use included cigarettes. She has a 4.00 pack-year smoking history. She has never used smokeless tobacco.  ETOH:   reports previous alcohol use. Family History:        Adopted: Yes   Problem Relation Age of Onset    Breast Cancer Mother     Ovarian Cancer Mother     No Known Problems Father     Other Sister         autistic (1 sister)    Cancer Brother         eye, testicular       Vitals:   Vitals:    11/01/21 2000 11/01/21 2021 11/01/21 2245 11/02/21 0330   BP: 130/81  122/87 122/80   Pulse: 57 56 57 60   Resp: 16  18 16   Temp: 98.6 °F (37 °C)  98.8 °F (37.1 °C) 98 °F (36.7 °C)   TempSrc: Temporal  Oral Oral   SpO2: 100%  100%    Weight:       Height:           Physical Exam  GEN -Awake. NAD. Appears given age. EYES -PERRLA. No scleral erythema, discharge, or conjunctivitis. HENT -MM are moist. Oral pharynx without exudates, no evidence of thrush. NECK -Supple, no apparent thyromegaly or masses. RESP -CTA, no wheezes, rales or rhonchi. Symmetric chest movement while on room air . C/V -S1/S2 auscultated. RRR without appreciable M/R/G. No JVD or carotid bruits. Peripheral pulses equal bilaterally and palpable. Cap refill <3 sec. No peripheral edema. GI -Abdomen is soft non distended. Surgical incisions visualized, intact without sign of infection. + BS. No masses or guarding.  -No CVA/ flank tenderness. Alcantara catheter is not present. LYMPH-No palpable cervical lymphadenopathy and no hepatosplenomegaly. No petechiae or ecchymoses. MS -No gross joint deformities. SKIN -Normal coloration, warm, dry. Corinda Bachelor, alert, oriented x  person, place, time, situation. Cranial nerves appear grossly intact, normal speech, no lateralizing weakness. PSYC- Appropriate affect. Imaging: Reviewed. No results found. Labs:  Reviewed  Lab Results:  CBC   No results for input(s): WBC, HGB, HCT, PLT in the last 72 hours. RENAL  No results for input(s): NA, K, CL, CO2, PHOS, BUN, CREATININE in the last 72 hours. Invalid input(s): CA  LFT'S  No results for input(s): AST, ALT, ALB, BILIDIR, BILITOT, ALKPHOS in the last 72 hours. COAG  No results for input(s): INR in the last 72 hours. CARDIAC ENZYMES  No results for input(s): CKTOTAL, CKMB, CKMBINDEX, TROPONINI in the last 72 hours. U/A:    Lab Results   Component Value Date    COLORU YELLOW 11/16/2020    WBCUA 1 11/16/2020    RBCUA NO CELLS SEEN 11/16/2020    MUCUS OCCASIONAL 01/18/2018    BACTERIA NEGATIVE 11/16/2020    CLARITYU CLEAR 11/16/2020    SPECGRAV 1.015 11/16/2020    LEUKOCYTESUR NEGATIVE 11/16/2020    BLOODU NEGATIVE 11/16/2020    GLUCOSEU Negative 05/12/2015     ABG  No results found for: FAF7RXZ, BEART, Z6RPZKGA, PHART, THGBART, RMA4SRQ, PO2ART, BWJ1NRE    Medical Decision Making:   Abdominal pain s/p bariatric surgery, POD #1  o Robotic-assisted lap sleeve gastrectomy on 11/1 by Dr. Godwin Felix.  Per op note, no complications  o Given ancef per surgery team x 3 doses  o Pain control and nausea control PRN, currently with toradol, norco and morphine PRN  o Continue LR post surgery   Hyperglycemia  o Possibly stress reaction.  10/27/21 Hemoglobin A1C 6.0    Chronic conditions: home medications continued unless contraindicated   Obesity BMI 40   Hx of gestational diabetes   Depression   GERD  o Continue pepcid    Patient assessment and plan discussed and reviewed with admitting physician:   Dr. Guy Low    DVT Prophylaxis: lovenox  Diet: bariatric  Code Status: full     Family: at bedside   Marko Rodas

## 2021-11-02 NOTE — PROGRESS NOTES
Had surgery yesterday. Still having soreness in abdomen. Had gestational diabetes about 5 years ago, but for last two years has been controlling sugars with diet.      Has abdominal surgical wounds  Tenderness to palpation    Gastric Sleeve - mgmt as per general surgery  Hyperglycemia - monitor glucose levels

## 2021-11-03 VITALS
HEIGHT: 65 IN | DIASTOLIC BLOOD PRESSURE: 75 MMHG | OXYGEN SATURATION: 96 % | WEIGHT: 241 LBS | RESPIRATION RATE: 16 BRPM | TEMPERATURE: 98.8 F | SYSTOLIC BLOOD PRESSURE: 129 MMHG | BODY MASS INDEX: 40.15 KG/M2 | HEART RATE: 51 BPM

## 2021-11-03 LAB
ANION GAP SERPL CALCULATED.3IONS-SCNC: 12 MMOL/L (ref 4–16)
BUN BLDV-MCNC: 13 MG/DL (ref 6–23)
CALCIUM SERPL-MCNC: 9.1 MG/DL (ref 8.3–10.6)
CHLORIDE BLD-SCNC: 104 MMOL/L (ref 99–110)
CO2: 24 MMOL/L (ref 21–32)
CREAT SERPL-MCNC: 0.9 MG/DL (ref 0.6–1.1)
GFR AFRICAN AMERICAN: >60 ML/MIN/1.73M2
GFR NON-AFRICAN AMERICAN: >60 ML/MIN/1.73M2
GLUCOSE BLD-MCNC: 116 MG/DL (ref 70–99)
GLUCOSE BLD-MCNC: 87 MG/DL (ref 70–99)
GLUCOSE BLD-MCNC: 95 MG/DL (ref 70–99)
HCT VFR BLD CALC: 35.4 % (ref 37–47)
HEMOGLOBIN: 11 GM/DL (ref 12.5–16)
MCH RBC QN AUTO: 28.6 PG (ref 27–31)
MCHC RBC AUTO-ENTMCNC: 31.1 % (ref 32–36)
MCV RBC AUTO: 92.2 FL (ref 78–100)
PDW BLD-RTO: 14.4 % (ref 11.7–14.9)
PLATELET # BLD: 207 K/CU MM (ref 140–440)
PMV BLD AUTO: 10.7 FL (ref 7.5–11.1)
POTASSIUM SERPL-SCNC: 4.3 MMOL/L (ref 3.5–5.1)
RBC # BLD: 3.84 M/CU MM (ref 4.2–5.4)
SODIUM BLD-SCNC: 140 MMOL/L (ref 135–145)
WBC # BLD: 8.9 K/CU MM (ref 4–10.5)

## 2021-11-03 PROCEDURE — 2580000003 HC RX 258: Performed by: SURGERY

## 2021-11-03 PROCEDURE — 6360000002 HC RX W HCPCS: Performed by: SURGERY

## 2021-11-03 PROCEDURE — 6370000000 HC RX 637 (ALT 250 FOR IP): Performed by: SURGERY

## 2021-11-03 PROCEDURE — APPNB30 APP NON BILLABLE TIME 0-30 MINS: Performed by: NURSE PRACTITIONER

## 2021-11-03 PROCEDURE — 80048 BASIC METABOLIC PNL TOTAL CA: CPT

## 2021-11-03 PROCEDURE — 82962 GLUCOSE BLOOD TEST: CPT

## 2021-11-03 PROCEDURE — 6370000000 HC RX 637 (ALT 250 FOR IP): Performed by: PHYSICIAN ASSISTANT

## 2021-11-03 PROCEDURE — 99024 POSTOP FOLLOW-UP VISIT: CPT | Performed by: SURGERY

## 2021-11-03 PROCEDURE — 36415 COLL VENOUS BLD VENIPUNCTURE: CPT

## 2021-11-03 PROCEDURE — 99024 POSTOP FOLLOW-UP VISIT: CPT | Performed by: NURSE PRACTITIONER

## 2021-11-03 PROCEDURE — 85027 COMPLETE CBC AUTOMATED: CPT

## 2021-11-03 RX ORDER — PANTOPRAZOLE SODIUM 40 MG/1
40 TABLET, DELAYED RELEASE ORAL
Status: DISCONTINUED | OUTPATIENT
Start: 2021-11-03 | End: 2021-11-03 | Stop reason: HOSPADM

## 2021-11-03 RX ADMIN — ACETAMINOPHEN 650 MG: 325 TABLET ORAL at 06:37

## 2021-11-03 RX ADMIN — PANTOPRAZOLE SODIUM 40 MG: 40 TABLET, DELAYED RELEASE ORAL at 07:54

## 2021-11-03 RX ADMIN — METOCLOPRAMIDE HYDROCHLORIDE 10 MG: 5 INJECTION INTRAMUSCULAR; INTRAVENOUS at 09:08

## 2021-11-03 RX ADMIN — ENOXAPARIN SODIUM 40 MG: 100 INJECTION SUBCUTANEOUS at 09:10

## 2021-11-03 RX ADMIN — SODIUM CHLORIDE, POTASSIUM CHLORIDE, SODIUM LACTATE AND CALCIUM CHLORIDE: 600; 310; 30; 20 INJECTION, SOLUTION INTRAVENOUS at 03:41

## 2021-11-03 ASSESSMENT — PAIN DESCRIPTION - PROGRESSION
CLINICAL_PROGRESSION: NOT CHANGED

## 2021-11-03 ASSESSMENT — PAIN SCALES - GENERAL
PAINLEVEL_OUTOF10: 4
PAINLEVEL_OUTOF10: 4
PAINLEVEL_OUTOF10: 0

## 2021-11-03 ASSESSMENT — PAIN DESCRIPTION - ORIENTATION: ORIENTATION: LOWER;ANTERIOR

## 2021-11-03 ASSESSMENT — PAIN DESCRIPTION - PAIN TYPE: TYPE: SURGICAL PAIN

## 2021-11-03 ASSESSMENT — PAIN DESCRIPTION - LOCATION: LOCATION: ABDOMEN

## 2021-11-03 NOTE — DISCHARGE SUMMARY
Discharge Summary     Patient ID:  Klaudia Payan  [id number]  61 y.o.  1990    Admit date: 2021    Discharge date: 11/3/2021     Admitting Physician: Rogelio Daigle MD     Admission Diagnoses: Morbid obesity ([facility] [E66.01]  Patient Active Problem List   Diagnosis    Weight gain    Pre-diabetes    Lesion of buccal mucosa    Tobacco use    Obesity    Moderate episode of recurrent major depressive disorder (HCC)    Anxiety    Positive depression screening    Generalized body aches    Viral URI    Morbid obesity with BMI of 40.0-44.9, adult (HCC)    Complex third degree hemorrhoid    Morbid obesity ([facility] [address]     Past Medical History:   Diagnosis Date    Depression     Diabetes mellitus ([facility]     type 2-diet controlled. \"had gestational diabetes and my hbgA1c was 6.1- off medications for the past 2 yrs now just diet controlled    GERD (gastroesophageal reflux disease)     Group B streptococcal infection during pregnancy      (normal spontaneous vaginal delivery) 7-2-15    delivered in ambulance 12 minutes    Obesity, unspecified     Pancreatitis chronic     Problems with hearing     dr Alfredito Ly; only due to when she had a cold       Discharge Diagnoses: same    Admission Condition: Good. Discharged Condition: Good. Indication for Admission: Elective bariatric surgery. Hospital Course: Patient had an uneventful hospital course after her bariatric procedure that went uneventfully: robot assisted sleeve gastrectomy and was tolerating bariatric stage II diet and ambulating without difficulty at the time of discharge. Consults: Hospitalist / PCP. Treatments: IV hydration, antibiotics, analgesia, LMW heparin, respiratory therapy: O2 and incentive spirometry and surgery: robot assisted sleeve gastrectomy. Discharge Exam:  See daily progress note      Discharge vitals:    Wt Readings from Last 3 Encounters:   10/25/21 241 lb (109.3 kg) 10/29/21 245 lb 8 oz (111.4 kg)   10/25/21 243 lb 4.8 oz (110.4 kg)   ,  Temp Readings from Last 3 Encounters:   11/03/21 98.8 °F (37.1 °C) (Oral)   11/01/21 97.7 °F (36.5 °C)   09/14/21 96.8 °F (36 °C) (Infrared)   ,  BP Readings from Last 3 Encounters:   11/03/21 (!) 242/74   11/01/21 136/82   10/21/21 124/82   ,  Pulse Readings from Last 3 Encounters:   11/03/21 51   10/21/21 72   09/14/21 102        Disposition: home. Patient Instructions:   Current Discharge Medication List      START taking these medications    Details   HYDROcodone-acetaminophen (NORCO) 5-325 MG per tablet Take 1 tablet by mouth every 6 hours as needed for Pain for up to 5 days. Intended supply: 5 days. Take lowest dose possible to manage pain  Qty: 20 tablet, Refills: 0    Comments: Reduce doses taken as pain becomes manageable  Associated Diagnoses: Morbid obesity (HCC)      senna (SENOKOT) 8.6 MG tablet Take 1 tablet by mouth 2 times daily  Qty: 60 tablet, Refills: 0      ondansetron (ZOFRAN) 4 MG tablet Take 1 tablet by mouth every 8 hours as needed for Nausea or Vomiting  Qty: 20 tablet, Refills: 1      metoclopramide (REGLAN) 10 MG tablet Take 1 tablet by mouth 3 times daily as needed (nausea or vomiting)  Qty: 30 tablet, Refills: 0         CONTINUE these medications which have NOT CHANGED    Details   omeprazole (PRILOSEC) 40 MG delayed release capsule Take 1 capsule by mouth every morning (before breakfast)  Qty: 30 capsule, Refills: 2         STOP taking these medications       albuterol sulfate HFA (PROVENTIL HFA) 108 (90 Base) MCG/ACT inhaler Comments:   Reason for Stopping:             Activity: no lifting > 20 Lbs, or Strenuous exercise for 3 weeks. Diet: encourage fluids and bariatric stage II diet for 2 wks.   Wound Care: keep wound clean and dry    Call  [phone] to make a follow-up appointment  with Dr Sebastián Courtney in 1 week.    ____________________________________________    Signed:    CARLEE Arevalo CNP, APRN-CNP    11/3/2021  9:30 AM

## 2021-11-03 NOTE — PROGRESS NOTES
GENERAL SURGERY INPATIENT POST-OP NOTE  [facility]    PATIENT: Stew Gonsales, 28 y.o., female, MRN: [id number]    Hospital Day:  LOS: 2 days , Post-Op Day: 2 Days Post-Op    Procedure(s): 21 (Dr. Alan Herndon) Robotic-Assisted Laparoscopic Sleeve Gastrectomy    Stew Gonsales is a 28 y.o. female who presented with morbid obesity for scheduled surgery             Subjective:  Chief Complaint: abdominal pain  Pain: 710  BM: none since surgery   Diet: BARIATRIC DIET; Bariatric Full Liquid  Activity: as tolerated    Patient is feeling much better today. Has been out of bed. Tolerating full liqudis, passing flatus and had small BM.     Objective:    Vitals: BP (!) 242/74   Pulse 51   Temp 98.8 °F (37.1 °C) (Oral)   Resp 16   Ht 5' 5\" (1.651 m)   Wt 241 lb (109.3 kg)   LMP 10/01/2021 Comment: negative urine pregnancy  SpO2 96%   BMI 40.10 kg/m²   Vital Signs (Last 24 Hours)  Temp  Av.7 °F (37.1 °C)  Min: 98.5 °F (36.9 °C)  Max: 99.1 °F (37.3 °C)  Pulse  Av.2  Min: 51  Max: 60  BP  Min: 104/76  Max: 242/74  Resp  Avg: 15.5  Min: 14  Max: 16  SpO2  Av.8 %  Min: 95 %  Max: 97 %  Wt Readings from Last 3 Encounters:   10/25/21 241 lb (109.3 kg)   10/29/21 245 lb 8 oz (111.4 kg)   10/25/21 243 lb 4.8 oz (110.4 kg)       I/O:  0701 -  0700  In: 2040 [P.O.:540; I.V.:1500]  Out: -     IV Fluids: sodium chloride    lactated ringers Last Rate: 125 mL/hr at 21 0341    Scheduled Meds:   pantoprazole, 40 mg, Oral, QAM AC    scopolamine, 1 patch, TransDERmal, Q72H    acetaminophen, 650 mg, Oral, Q6H    sodium chloride flush, 5-40 mL, IntraVENous, 2 times per day    enoxaparin, 40 mg, SubCUTAneous, 2 times per day    Physical Exam:  General Appearance:   Alert, cooperative, no distress    Head:   Normocephalic, atraumatic    Lungs:    Equal chest rise, respirations unlabored   Heart:   Regular rate and rhythm    Abdomen:    Soft, appropriately tender, no rebound or guarding Incisions C/D/I   Extremities:  No cyanosis or edema    Neurologic:  Nonfocal, grossly intact        Labs/Imaging Results:   Recent Results (from the past 24 hour(s))   Comprehensive Metabolic Panel w/ Reflex to MG    Collection Time: 11/02/21 11:11 AM   Result Value Ref Range    Sodium 139 135 - 145 MMOL/L    Potassium 4.1 3.5 - 5.1 MMOL/L    Chloride 102 99 - 110 mMol/L    CO2 26 21 - 32 MMOL/L    BUN 11 6 - 23 MG/DL    CREATININE 0.8 0.6 - 1.1 MG/DL    Glucose 119 (H) 70 - 99 MG/DL    Calcium 9.1 8.3 - 10.6 MG/DL    Albumin 4.0 3.4 - 5.0 GM/DL    Total Protein 6.3 (L) 6.4 - 8.2 GM/DL    Total Bilirubin 0.3 0.0 - 1.0 MG/DL    ALT 32 10 - 40 U/L    AST 26 15 - 37 IU/L    Alkaline Phosphatase 63 40 - 128 IU/L    GFR Non-African American >60 >60 mL/min/1.73m2    GFR African American >60 >60 mL/min/1.73m2    Anion Gap 11 4 - 16   CBC auto differential    Collection Time: 11/02/21 11:11 AM   Result Value Ref Range    WBC 10.8 (H) 4.0 - 10.5 K/CU MM    RBC 4.13 (L) 4.2 - 5.4 M/CU MM    Hemoglobin 11.7 (L) 12.5 - 16.0 GM/DL    Hematocrit 37.2 37 - 47 %    MCV 90.1 78 - 100 FL    MCH 28.3 27 - 31 PG    MCHC 31.5 (L) 32.0 - 36.0 %    RDW 14.2 11.7 - 14.9 %    Platelets 838 830 - 183 K/CU MM    MPV 10.4 7.5 - 11.1 FL    Differential Type AUTOMATED DIFFERENTIAL     Segs Relative 77.7 (H) 36 - 66 %    Lymphocytes % 15.9 (L) 24 - 44 %    Monocytes % 5.6 (H) 0 - 4 %    Eosinophils % 0.1 0 - 3 %    Basophils % 0.1 0 - 1 %    Segs Absolute 8.4 K/CU MM    Lymphocytes Absolute 1.7 K/CU MM    Monocytes Absolute 0.6 K/CU MM    Eosinophils Absolute 0.0 K/CU MM    Basophils Absolute 0.0 K/CU MM    Nucleated RBC % 0.0 %    Total Nucleated RBC 0.0 K/CU MM    Total Immature Neutrophil 0.07 K/CU MM    Immature Neutrophil % 0.6 (H) 0 - 0.43 %   POCT Glucose    Collection Time: 11/02/21 11:49 AM   Result Value Ref Range    POC Glucose 111 (H) 70 - 99 MG/DL   POCT Glucose    Collection Time: 11/02/21  5:02 PM   Result Value Ref Range    POC Glucose 119 (H) 70 - 99 MG/DL   POCT Glucose    Collection Time: 11/03/21  7:10 AM   Result Value Ref Range    POC Glucose 87 70 - 99 MG/DL         Assessment:  Crys Hope is a 28 y.o. female who is post-op day #2 Days Post-Op 11/1/21 (Dr. Rudy Villanueva) Robotic-Assisted Laparoscopic Sleeve Gastrectomy . Active Problems: Morbid obesity ([address]  Resolved Problems:    * No resolved hospital problems. *      Plan:    - Tolerating full liquids  - Pain and nausea under better control  - Will plan for discharge this morning. Discussed discharge, medications, restrictions, postop care, and diet stages. Patients verbalizes understanding. Electronically signed: CARLEE Aragon - CNP 11/3/2021 9:27 AM       Doing well. Without complaints this morning. Minimal soreness at RLQ extraction site. Denies CP, SOB, N/V, F/C, extremity swelling. Vitals:    11/03/21 0900   BP: (!) 242/74   Pulse: 51   Resp: 16   Temp: 98.8 °F (37.1 °C)   SpO2: 96%     BP was entered wrong into pt's chart -- did d/w pt's nurse. It was /74. A&Ox3, NAD at rest.  AT. NC. Breathing unlabored.    S, min and appropriately TTP, ND, no PS, incisions C/D/I healing appropriately  CROWELL  Warm, dry    29 y/o F POD 2 s/p robotic-assisted SG    -D/c home today  -Plan as above  -F/u as scheduled  -D/w pt's nurse to verify the recorded BP of 242/74 was not correct    Teresa Keys II, MD

## 2021-11-03 NOTE — PROGRESS NOTES
Outpatient Pharmacy Progress Note for Meds-to-Beds    Total number of Prescriptions Filled: 3  The following medications were delivered to the patient or nursing unit during the discharge process:   Hydrocodone-APAP   Metoclopramide   Ondansetron    Additional Documentation:   The prescription for Senna was not covered under the patient's insurance; this medication can be purchased as an over-the-counter medication. Thank you for letting us serve your patients.   [facility]    [address], [address] [facility]    Phone: [phone]    Fax: [phone]

## 2021-11-04 ENCOUNTER — CARE COORDINATION (OUTPATIENT)
Dept: CASE MANAGEMENT | Age: 31
End: 2021-11-04

## 2021-11-04 ENCOUNTER — TELEPHONE (OUTPATIENT)
Dept: FAMILY MEDICINE CLINIC | Age: 31
End: 2021-11-04

## 2021-11-04 DIAGNOSIS — E66.01 MORBID OBESITY (HCC): Primary | ICD-10-CM

## 2021-11-04 NOTE — CARE COORDINATION
[facility] Transitions Initial Follow Up Call    Call within 2 business days of discharge: Yes    Patient: Cheryle Ebbing Patient : 1990   MRN: [id number]  Reason for Admission: Morbid Obesity s/p scheduled Lap Sleeve Gastrectomy 21  Discharge Date: 11/3/21 RARS: Readmission Risk Score: 3.4  Facility: [address] [place] [address]       Complaint Diagnosis Description Type Department Provider    21  Encounter for preadmission testing . .. Admission (Discharged) [address], [place]        Non-face-to-face services provided:  Obtained and reviewed discharge summary and/or continuity of care documents    Care Transitions 24 Hour Call    Schedule Follow Up Appointment with PCP: Jose Baldwin you have any ongoing symptoms?: Yes  Patient-reported symptoms: Nausea, Pain (Comment: see note)  Interventions for patient-reported symptoms: Other (Comment: no identified or reported need)  Do you have a copy of your discharge instructions?: Yes  Do you have all of your prescriptions and are they filled?: Yes  Have you been contacted by a [facility] Pharmacist?: No  Have you scheduled your follow up appointment?: Yes  How are you going to get to your appointment?: Car - family or friend to transport  Were you discharged with any Home Care or Post Acute Services: No  Do you feel like you have everything you need to keep you well at home?: Yes  Care Transitions Interventions  No Identified Needs       Future Appointments   Date Time Provider Kylie Miller   2021  1:00 PM [facility] [address], MD [facility]   2021 11:20 AM Eddie Redmond PA-C [facility]     Challenges to be reviewed by the provider   Additional needs identified to be addressed with provider: no     Method of communication with provider : none    CARE TRANSITION MONITORING  X 2 wks  [facility] PCP: Eddie Redmond PAC  RARS: 3    Was this a readmission?  No  Patient stated reason for admission: Scheduled procedure  Patients top risk factors for readmission: medical condition-Morbid Obesity s/p Lap Sleeve Gastrectomy, DM, Recurrent Pancreatitis, GERD    Spoke with Patient for Care Transition discharge call, verified Patient name and  as identifiers. Introduced self and explained CT program. Agreeable to ongoing CT follow up. Phone Assessment: Reports doing well since home. Reports post-op discomfort manageable with prn Norco or \"riding it out\". Advised not letting pain get out of control, importance of water and senna to help reduce risk of opioid induced constipation. Reports nausea, no vomiting; taking Reglan and Zofran as directed. Reports maintaining bariatric full liquid diet, 600 calorie daily, drinking Propel water and on track to consume 46g protein today---adding protein powder, working toward goal of 60g per day. Advised no straws, carbonated beverages, bread. Reports voiding qs. No bm since home, + flatus. AVS/Meds: Confirmed copy of AVS received, reviewed with Patient. Patient adhering to post-op instruction pg 9-13 of AVS. Confirmed Patient obtained and is taking all new and routine rx as directed. Med education provided, questions answered, verbalizes understanding. Stressed importance of med compliance. 1111F medication review completed with Patient . Advised obtaining 90 day supply of routine, prn meds. Advised contacting pharmacy/md for refill needs. Resource Need Assessment:   Living Arrangements: lives w/ spouse and children  ADLS:independent, drives, employed pta  DME:none, denies need   Transportation: self or spouse  HHC:none, denies need   Community Assistance:none, denies need   Referrals Made per CTN with Patient/Family Approval: [phone] Pending [facility] to [facility] Follow Up Appointments: Discussed importance of 7 day hospital follow up appointment for continuity of care. Transportation confirmed for the above appts.      [id number]:  Patient received Sudha Flick 19 vaccine series  and was Covid19 positive approximately 5 wks ago. Reviewed [id number] preventative measures including mask covering nose/mouth, social distancing, hand washing. Advanced Care Planning: Full code. No advance directives, considering. Not interested in ACP referral at this time. Healthcare Decision Maker:    Primary Decision Maker: Huyen Jara - Spouse - [phone]    Advised to contact PCP / Surgeon 24/7 re: any health concerns for early outpatient intervention in an effort to avoid hospitalization. Discussed benefits of WIC, Urgent Care. Advised 911 if noting acute distress.     Upon next outreach: JAYDON MORRIS education, f/u on appts, weight, dietary goals  Lambert Ramirez RN

## 2021-11-09 ENCOUNTER — OFFICE VISIT (OUTPATIENT)
Dept: BARIATRICS/WEIGHT MGMT | Age: 31
End: 2021-11-09

## 2021-11-09 VITALS
WEIGHT: 231.1 LBS | HEART RATE: 85 BPM | BODY MASS INDEX: 38.51 KG/M2 | HEIGHT: 65 IN | DIASTOLIC BLOOD PRESSURE: 88 MMHG | SYSTOLIC BLOOD PRESSURE: 126 MMHG

## 2021-11-09 DIAGNOSIS — Z98.84 S/P LAPAROSCOPIC SLEEVE GASTRECTOMY: Primary | ICD-10-CM

## 2021-11-09 PROCEDURE — 99024 POSTOP FOLLOW-UP VISIT: CPT | Performed by: SURGERY

## 2021-11-09 NOTE — PROGRESS NOTES
BARIATRIC SURGERY OFFICE PROGRESS NOTE    SUBJECTIVE:    Patient presenting today referred from Maria T Olivares PA-C, for   Chief Complaint   Patient presents with   [facility] Post Op Follow Up     1st P/O Gastric Sleeve, 21   . Vitals:    21 1252   BP: 126/88   Pulse: 85        BMI: Body mass index is 38.46 kg/m². Weight History: Wt Readings from Last 3 Encounters:   21 231 lb 1.6 oz (104.8 kg)   10/25/21 241 lb (109.3 kg)   10/29/21 245 lb 8 oz (111.4 kg)        If within 30 days of bariatric surgery date, have you been to the ED: No      HPI:Judy Rivera is a 28 y.o. female presenting in first bariatric POST-OP visit. Total weight loss/gain: -14.4 lbs since last visit, -14.4 lbs since surgery, -21.7 lbs since starting program    Thoroughly reviewed the patient's medical history, family history, social history and review of systems with the patient today in the office. Please see medical record for pertinent positives. Changes in health since last visit: None, doing well most days. Some days struggles with PO intake (getting the goal amounts in). Mild nausea. +BM x 2 since surgery. Pt tracking calories: Yes. Pt exercising: Walking a lot at home. Past Medical History:   Diagnosis Date    Depression     Diabetes mellitus ([facility] [address]     type 2-diet controlled. \"had gestational diabetes and my hbgA1c was 6.1- off medications for the past 2 yrs now just diet controlled    GERD (gastroesophageal reflux disease)     Group B streptococcal infection during pregnancy      (normal spontaneous vaginal delivery) 7-2-15    delivered in ambulance 12 minutes    Obesity, unspecified     Pancreatitis chronic     Problems with hearing     dr Rocael Brady; only due to when she had a cold        Patient Active Problem List   Diagnosis    Weight gain    Pre-diabetes    Lesion of buccal mucosa    Tobacco use    Obesity    Moderate episode of recurrent major depressive disorder ([address]  Anxiety    Positive depression screening    Generalized body aches    Viral URI    Morbid obesity with BMI of 40.0-44.9, adult (HCC)    Complex third degree hemorrhoid    Morbid obesity ([address]       Past Surgical History:   Procedure Laterality Date    ENDOSCOPY, COLON, DIAGNOSTIC      HEMORRHOID SURGERY      x3- last time 2019    SLEEVE GASTRECTOMY N/A 11/1/2021    GASTRECTOMY SLEEVE LAPAROSCOPIC ROBOTIC performed by Oscar Quiles MD at [facility]  2015 August    UPPER GASTROINTESTINAL ENDOSCOPY N/A 7/20/2021    EGD BIOPSY performed by Roro Mcmanus MD at [address] [facility]  2015    reconstruction between vag/anal area from childbirth       Current Outpatient Medications   Medication Sig Dispense Refill    senna (SENOKOT) 8.6 MG tablet Take 1 tablet by mouth 2 times daily 60 tablet 0    ondansetron (ZOFRAN) 4 MG tablet Take 1 tablet by mouth every 8 hours as needed for Nausea or Vomiting 20 tablet 1    metoclopramide (REGLAN) 10 MG tablet Take 1 tablet by mouth 3 times daily as needed (nausea or vomiting) 30 tablet 0    omeprazole (PRILOSEC) 40 MG delayed release capsule Take 1 capsule by mouth every morning (before breakfast) 30 capsule 2     No current facility-administered medications for this visit. Allergies   Allergen Reactions    Wool Alcohol [Lanolin] Hives     \"wool\" ; hives/ blisters         Review of Systems    OBJECTIVE:    /88 (Site: Right Upper Arm, Position: Sitting, Cuff Size: Medium Adult)   Pulse 85   Ht 5' 5\" (1.651 m)   Wt 231 lb 1.6 oz (104.8 kg)   BMI 38.46 kg/m²      Physical Exam  Vitals reviewed. Constitutional:       General: She is not in acute distress. Appearance: She is not toxic-appearing. HENT:      Head: Normocephalic and atraumatic. Nose: Nose normal.   Eyes:      General:         Right eye: No discharge. Left eye: No discharge. Extraocular Movements: Extraocular movements intact. Cardiovascular:      Rate and Rhythm: Normal rate. Pulmonary:      Effort: No respiratory distress. Abdominal:      Palpations: Abdomen is soft. Tenderness: There is no abdominal tenderness. Comments: Incisions healing appropriately     Musculoskeletal:         General: Normal range of motion. Cervical back: Normal range of motion. Skin:     General: Skin is warm. Neurological:      General: No focal deficit present. Mental Status: She is alert. Psychiatric:         Mood and Affect: Mood normal.       Path:  Final Pathologic Diagnosis:   Stomach, partial resection:   -  Mild chronic gastritis (see stains report). -  Helicobacter pylori microorganisms are not identified. ASSESSMENT & PLAN:    1. S/P laparoscopic sleeve gastrectomy  -Down 14.4 lbs since surgery. Down 21.7 lbs since starting program.   -Bariatric fulls x 1 more week then advance per protocol.   -Increase acitivity. -Reviewed pathology report. -F/u in one week. -Goal 60 g protein / day, 600 dayana / day. -Call with any questions, concerns, or issues whatsoever. As of current visit, regarding obesity-related co-morbid conditions:  LITZY [] compliant [] no longer using [] resolved per sleep study; hypertension [] medications; hyperlipidemia [] medications; GERD [] medications; DM [] insulin [] non-insulin [] no meds      No orders of the defined types were placed in this encounter. No orders of the defined types were placed in this encounter. Follow Up:  No follow-ups on file.     Urmila Lewis MD

## 2021-11-12 ENCOUNTER — CARE COORDINATION (OUTPATIENT)
Dept: CASE MANAGEMENT | Age: 31
End: 2021-11-12

## 2021-11-12 NOTE — CARE COORDINATION
[facility] Transitions Follow Up Call    2021    Patient: Claudia Young  Patient : 1990   MRN: [id number]  Reason for Admission: Morbid Obesity s/p scheduled Lap Sleeve Gastrectomy 21  Discharge Date: 11/3/21 RARS: Readmission Risk Score: 3.4    Care Transitions Subsequent and Final Call    Subsequent and Final Calls  Do you have any ongoing symptoms?: No  Have your medications changed?: No  Do you have any questions related to your medications?: No  Do you currently have any active services?: No  Do you have any needs or concerns that I can assist you with?: No  Identified Barriers: Other  Care Transitions Interventions  No Identified Needs  Other Interventions:         Future Appointments   Date Time Provider Kylie Miller   2021  9:15 AM Ramses Lopez APRN - CNP [facility]     CARE TRANSITION MONITORING  X 2 wks  [facility] PCP: Harika NORTH  RARS: 3     Spoke w/ Patient for follow up calls. Reports doing well s/p Lap Sleeve Gastrectomy 21. Seen for post-op appt 21. Noted to have lost 14.1 # since surgery. Patient denies n/v or abd pain. +bm, voiding. Tolerating bariatric diet. Tracking calories, protein and water intake; reports meeting goals most days. Reports BG good, adhering to DM ZM education. Increasing activity slowly-- wne to museum yesterday and walked. Denies resource needs or need for ongoing follow up. Patient independent in managing adls and healthcare. Reminded to continue previously discussed Covid19 preventative measures. Advised to contact PCP / Surgeon  re: any health concerns for early outpatient intervention in an effort to avoid hospitalization. Episode closed, no further outreach scheduled.      Sarah Malloy RN

## 2021-11-16 ENCOUNTER — OFFICE VISIT (OUTPATIENT)
Dept: BARIATRICS/WEIGHT MGMT | Age: 31
End: 2021-11-16

## 2021-11-16 VITALS
BODY MASS INDEX: 37.62 KG/M2 | HEART RATE: 80 BPM | HEIGHT: 65 IN | SYSTOLIC BLOOD PRESSURE: 114 MMHG | DIASTOLIC BLOOD PRESSURE: 74 MMHG | WEIGHT: 225.8 LBS

## 2021-11-16 DIAGNOSIS — Z98.84 S/P LAPAROSCOPIC SLEEVE GASTRECTOMY: Primary | ICD-10-CM

## 2021-11-16 PROCEDURE — 99024 POSTOP FOLLOW-UP VISIT: CPT | Performed by: NURSE PRACTITIONER

## 2021-11-16 RX ORDER — ONDANSETRON 4 MG/1
4 TABLET, FILM COATED ORAL EVERY 8 HOURS PRN
COMMUNITY

## 2021-11-16 ASSESSMENT — ENCOUNTER SYMPTOMS
SHORTNESS OF BREATH: 0
APNEA: 0
NAUSEA: 1
CHEST TIGHTNESS: 0
SORE THROAT: 0
DIARRHEA: 0
PHOTOPHOBIA: 0
COLOR CHANGE: 0
BACK PAIN: 0
WHEEZING: 0

## 2021-11-16 NOTE — PROGRESS NOTES
BARIATRIC SURGERY OFFICE PROGRESS NOTE    SUBJECTIVE:    Patient presenting today referred from Karley Metcalf PA-C, for   Chief Complaint   Patient presents with   [facility] Post Op Follow Up     2nd P/O Gastric Sleeve, 21   . Vitals:    21 0912   BP: 114/74   Pulse: 80        BMI: Body mass index is 37.58 kg/m². Weight History: Wt Readings from Last 3 Encounters:   21 225 lb 12.8 oz (102.4 kg)   21 231 lb 1.6 oz (104.8 kg)   10/25/21 241 lb (109.3 kg)       If within 30 days of bariatric surgery date, have you been to the ED: N/A, No, Yes - NA      HPI:Judy Marquis is a 28 y.o. female presenting for 2nd bariatric POST-OP visit    Total weight loss/gain: -5.3 lbs since last visit, -19.7 lbs since surgery, -27 lbs since starting program     Diet Recall:    Water intake: at least 32 ounces but not quite to 64 ounces  Protein intake: 30-60 G  Exercise: walking  Vitamins: supplementing  Resolution of Comorbid conditions/off meds: NA    - Incisions well healed. No signs of infection    - Tolerating current diet. Some nausea with puree    - BM's are normal.    - Having some hiccups    Thoroughly reviewed the patient's medical history, family history, social history and review of systems with the patient today in the office. Please see medical record for pertinent positives. Past Medical History:   Diagnosis Date    Depression     Diabetes mellitus ([address]     type 2-diet controlled. \"had gestational diabetes and my hbgA1c was 6.1- off medications for the past 2 yrs now just diet controlled    GERD (gastroesophageal reflux disease)     Group B streptococcal infection during pregnancy      (normal spontaneous vaginal delivery) 7-2-15    delivered in ambulance 12 minutes    Obesity, unspecified     Pancreatitis chronic     Problems with hearing     dr Angus Dugan; only due to when she had a cold        Patient Active Problem List   Diagnosis    Weight gain    Pre-diabetes    PLAN:    1. S/P laparoscopic sleeve gastrectomy  - Doing well overall  - Did have some nausea with puree foods. Recommended going back to full liquids for a few days  - Supplemented vitamins  - RTW note given for 11/30    -Patient was encouraged to journal all food intake.   -Keep calorie level at approximately 600, per discussion / plan with registered dietician.  -Protein intake is to be a minimum of 40-50 grams per day. -Water drinking was encouraged with a goal of 64oz-128oz daily. Beverages are to be calorie free except for milk. Avoid soda. -Continue to increase level of physical activity. As of current visit, regarding obesity-related co-morbid conditions:  LITZY [] compliant [] no longer using [] resolved per sleep study; hypertension [] medications; hyperlipidemia [] medications; GERD [] medications; DM [] insulin [] non-insulin [] no meds       No orders of the defined types were placed in this encounter. No orders of the defined types were placed in this encounter. Follow Up:  Return in about 4 weeks (around 12/14/2021) for weight check.     CARLEE Mcleod - CNP

## 2021-11-29 ENCOUNTER — HOSPITAL ENCOUNTER (OUTPATIENT)
Age: 31
Discharge: HOME | End: 2021-11-29
Payer: COMMERCIAL

## 2021-11-29 ENCOUNTER — TELEPHONE (OUTPATIENT)
Dept: SURGERY | Age: 31
End: 2021-11-29

## 2021-11-29 ENCOUNTER — HOSPITAL ENCOUNTER (OUTPATIENT)
Dept: ULTRASOUND IMAGING | Age: 31
Discharge: HOME | End: 2021-11-29
Payer: COMMERCIAL

## 2021-11-29 DIAGNOSIS — R10.84 GENERALIZED ABDOMINAL PAIN: ICD-10-CM

## 2021-11-29 DIAGNOSIS — R10.84 GENERALIZED ABDOMINAL PAIN: Primary | ICD-10-CM

## 2021-11-29 LAB
ALBUMIN SERPL-MCNC: 4.5 GM/DL (ref 3.4–5)
ALP BLD-CCNC: 76 IU/L (ref 40–128)
ALT SERPL-CCNC: 19 U/L (ref 10–40)
ANION GAP SERPL CALCULATED.3IONS-SCNC: 13 MMOL/L (ref 4–16)
AST SERPL-CCNC: 19 IU/L (ref 15–37)
BASOPHILS ABSOLUTE: 0 K/CU MM
BASOPHILS RELATIVE PERCENT: 0.3 % (ref 0–1)
BILIRUB SERPL-MCNC: 0.4 MG/DL (ref 0–1)
BUN BLDV-MCNC: 8 MG/DL (ref 6–23)
CALCIUM SERPL-MCNC: 9.6 MG/DL (ref 8.3–10.6)
CHLORIDE BLD-SCNC: 104 MMOL/L (ref 99–110)
CO2: 23 MMOL/L (ref 21–32)
CREAT SERPL-MCNC: 0.7 MG/DL (ref 0.6–1.1)
DIFFERENTIAL TYPE: ABNORMAL
EOSINOPHILS ABSOLUTE: 0.1 K/CU MM
EOSINOPHILS RELATIVE PERCENT: 1.6 % (ref 0–3)
GFR AFRICAN AMERICAN: >60 ML/MIN/1.73M2
GFR NON-AFRICAN AMERICAN: >60 ML/MIN/1.73M2
GLUCOSE BLD-MCNC: 95 MG/DL (ref 70–99)
HCT VFR BLD CALC: 39.9 % (ref 37–47)
HEMOGLOBIN: 13.1 GM/DL (ref 12.5–16)
IMMATURE NEUTROPHIL %: 0.3 % (ref 0–0.43)
LYMPHOCYTES ABSOLUTE: 2.2 K/CU MM
LYMPHOCYTES RELATIVE PERCENT: 28.7 % (ref 24–44)
MCH RBC QN AUTO: 28.2 PG (ref 27–31)
MCHC RBC AUTO-ENTMCNC: 32.8 % (ref 32–36)
MCV RBC AUTO: 86 FL (ref 78–100)
MONOCYTES ABSOLUTE: 0.4 K/CU MM
MONOCYTES RELATIVE PERCENT: 5.7 % (ref 0–4)
NUCLEATED RBC %: 0 %
PDW BLD-RTO: 14.2 % (ref 11.7–14.9)
PLATELET # BLD: 182 K/CU MM (ref 140–440)
PMV BLD AUTO: 11.2 FL (ref 7.5–11.1)
POTASSIUM SERPL-SCNC: 4.1 MMOL/L (ref 3.5–5.1)
RBC # BLD: 4.64 M/CU MM (ref 4.2–5.4)
SEGMENTED NEUTROPHILS ABSOLUTE COUNT: 4.8 K/CU MM
SEGMENTED NEUTROPHILS RELATIVE PERCENT: 63.4 % (ref 36–66)
SODIUM BLD-SCNC: 140 MMOL/L (ref 135–145)
TOTAL IMMATURE NEUTOROPHIL: 0.02 K/CU MM
TOTAL NUCLEATED RBC: 0 K/CU MM
TOTAL PROTEIN: 6.7 GM/DL (ref 6.4–8.2)
WBC # BLD: 7.6 K/CU MM (ref 4–10.5)

## 2021-11-29 PROCEDURE — 80053 COMPREHEN METABOLIC PANEL: CPT

## 2021-11-29 PROCEDURE — 76700 US EXAM ABDOM COMPLETE: CPT

## 2021-11-29 PROCEDURE — 85025 COMPLETE CBC W/AUTO DIFF WBC: CPT

## 2021-11-29 PROCEDURE — 36415 COLL VENOUS BLD VENIPUNCTURE: CPT

## 2021-11-29 NOTE — TELEPHONE ENCOUNTER
Pt called into the office with ruq pain - with n&v - pt stated she went back to full liquids, can not keep any food down.     Pt Dr. Frank Wan ok to order us, abd, cbc cmp and ugi

## 2021-12-14 ENCOUNTER — APPOINTMENT (OUTPATIENT)
Dept: GENERAL RADIOLOGY | Age: 31
End: 2021-12-14
Payer: COMMERCIAL

## 2021-12-14 ENCOUNTER — HOSPITAL ENCOUNTER (OUTPATIENT)
Age: 31
Setting detail: OBSERVATION
Discharge: HOME | End: 2021-12-16
Attending: EMERGENCY MEDICINE | Admitting: INTERNAL MEDICINE
Payer: COMMERCIAL

## 2021-12-14 ENCOUNTER — APPOINTMENT (OUTPATIENT)
Dept: CT IMAGING | Age: 31
End: 2021-12-14
Payer: COMMERCIAL

## 2021-12-14 ENCOUNTER — TELEPHONE (OUTPATIENT)
Dept: FAMILY MEDICINE CLINIC | Age: 31
End: 2021-12-14

## 2021-12-14 DIAGNOSIS — R47.81 SLURRED SPEECH: ICD-10-CM

## 2021-12-14 DIAGNOSIS — R29.810 FACIAL DROOP: Primary | ICD-10-CM

## 2021-12-14 PROBLEM — H02.409 DROOPING EYELID: Status: ACTIVE | Noted: 2021-12-14

## 2021-12-14 LAB
ALBUMIN SERPL-MCNC: 4.8 GM/DL (ref 3.4–5)
ALP BLD-CCNC: 89 IU/L (ref 40–128)
ALT SERPL-CCNC: 18 U/L (ref 10–40)
ANION GAP SERPL CALCULATED.3IONS-SCNC: 15 MMOL/L (ref 4–16)
AST SERPL-CCNC: 22 IU/L (ref 15–37)
BASOPHILS ABSOLUTE: 0 K/CU MM
BASOPHILS RELATIVE PERCENT: 0.4 % (ref 0–1)
BILIRUB SERPL-MCNC: 0.3 MG/DL (ref 0–1)
BUN BLDV-MCNC: 10 MG/DL (ref 6–23)
CALCIUM SERPL-MCNC: 10.1 MG/DL (ref 8.3–10.6)
CHLORIDE BLD-SCNC: 103 MMOL/L (ref 99–110)
CHP ED QC CHECK: YES
CO2: 25 MMOL/L (ref 21–32)
CREAT SERPL-MCNC: 0.7 MG/DL (ref 0.6–1.1)
DIFFERENTIAL TYPE: ABNORMAL
EOSINOPHILS ABSOLUTE: 0.2 K/CU MM
EOSINOPHILS RELATIVE PERCENT: 1.5 % (ref 0–3)
GFR AFRICAN AMERICAN: >60 ML/MIN/1.73M2
GFR NON-AFRICAN AMERICAN: >60 ML/MIN/1.73M2
GLUCOSE BLD-MCNC: 90 MG/DL (ref 70–99)
GLUCOSE BLD-MCNC: 96 MG/DL
GLUCOSE BLD-MCNC: 96 MG/DL (ref 70–99)
HCG QUALITATIVE: NEGATIVE
HCT VFR BLD CALC: 42.6 % (ref 37–47)
HEMOGLOBIN: 13.8 GM/DL (ref 12.5–16)
IMMATURE NEUTROPHIL %: 0.3 % (ref 0–0.43)
INR BLD: 0.98 INDEX
LYMPHOCYTES ABSOLUTE: 4.3 K/CU MM
LYMPHOCYTES RELATIVE PERCENT: 44.3 % (ref 24–44)
MCH RBC QN AUTO: 29.2 PG (ref 27–31)
MCHC RBC AUTO-ENTMCNC: 32.4 % (ref 32–36)
MCV RBC AUTO: 90.1 FL (ref 78–100)
MONOCYTES ABSOLUTE: 0.6 K/CU MM
MONOCYTES RELATIVE PERCENT: 5.9 % (ref 0–4)
NUCLEATED RBC %: 0 %
PDW BLD-RTO: 14.1 % (ref 11.7–14.9)
PLATELET # BLD: 245 K/CU MM (ref 140–440)
PMV BLD AUTO: 11.2 FL (ref 7.5–11.1)
POTASSIUM SERPL-SCNC: 3.9 MMOL/L (ref 3.5–5.1)
PROTHROMBIN TIME: 12.7 SECONDS (ref 11.7–14.5)
RBC # BLD: 4.73 M/CU MM (ref 4.2–5.4)
SEGMENTED NEUTROPHILS ABSOLUTE COUNT: 4.6 K/CU MM
SEGMENTED NEUTROPHILS RELATIVE PERCENT: 47.6 % (ref 36–66)
SODIUM BLD-SCNC: 143 MMOL/L (ref 135–145)
TOTAL IMMATURE NEUTOROPHIL: 0.03 K/CU MM
TOTAL NUCLEATED RBC: 0 K/CU MM
TOTAL PROTEIN: 7.6 GM/DL (ref 6.4–8.2)
TROPONIN T: <0.01 NG/ML
WBC # BLD: 9.8 K/CU MM (ref 4–10.5)

## 2021-12-14 PROCEDURE — 84484 ASSAY OF TROPONIN QUANT: CPT

## 2021-12-14 PROCEDURE — 6360000004 HC RX CONTRAST MEDICATION: Performed by: EMERGENCY MEDICINE

## 2021-12-14 PROCEDURE — 85025 COMPLETE CBC W/AUTO DIFF WBC: CPT

## 2021-12-14 PROCEDURE — 71045 X-RAY EXAM CHEST 1 VIEW: CPT

## 2021-12-14 PROCEDURE — 82962 GLUCOSE BLOOD TEST: CPT

## 2021-12-14 PROCEDURE — 70450 CT HEAD/BRAIN W/O DYE: CPT

## 2021-12-14 PROCEDURE — 99284 EMERGENCY DEPT VISIT MOD MDM: CPT

## 2021-12-14 PROCEDURE — G0378 HOSPITAL OBSERVATION PER HR: HCPCS

## 2021-12-14 PROCEDURE — 99219 PR INITIAL OBSERVATION CARE/DAY 50 MINUTES: CPT | Performed by: INTERNAL MEDICINE

## 2021-12-14 PROCEDURE — 85610 PROTHROMBIN TIME: CPT

## 2021-12-14 PROCEDURE — 70496 CT ANGIOGRAPHY HEAD: CPT

## 2021-12-14 PROCEDURE — 80053 COMPREHEN METABOLIC PANEL: CPT

## 2021-12-14 PROCEDURE — 84703 CHORIONIC GONADOTROPIN ASSAY: CPT

## 2021-12-14 RX ORDER — SODIUM CHLORIDE 0.9 % (FLUSH) 0.9 %
5-40 SYRINGE (ML) INJECTION EVERY 12 HOURS SCHEDULED
Status: DISCONTINUED | OUTPATIENT
Start: 2021-12-14 | End: 2021-12-16 | Stop reason: HOSPADM

## 2021-12-14 RX ORDER — SODIUM CHLORIDE 0.9 % (FLUSH) 0.9 %
5-40 SYRINGE (ML) INJECTION PRN
Status: DISCONTINUED | OUTPATIENT
Start: 2021-12-14 | End: 2021-12-16 | Stop reason: HOSPADM

## 2021-12-14 RX ORDER — SODIUM CHLORIDE 9 MG/ML
25 INJECTION, SOLUTION INTRAVENOUS PRN
Status: DISCONTINUED | OUTPATIENT
Start: 2021-12-14 | End: 2021-12-16 | Stop reason: HOSPADM

## 2021-12-14 RX ADMIN — IOPAMIDOL 90 ML: 755 INJECTION, SOLUTION INTRAVENOUS at 19:21

## 2021-12-14 ASSESSMENT — PAIN DESCRIPTION - PAIN TYPE: TYPE: ACUTE PAIN

## 2021-12-14 ASSESSMENT — PAIN SCALES - GENERAL: PAINLEVEL_OUTOF10: 6

## 2021-12-14 NOTE — LETTER
[facility] [address]  [address] [id number]  Phone: [phone]             December 16, 2021    Patient: Olvin Cowan   YOB: 1990   Date of Visit: 12/14/2021       To Whom It May Concern:    Tyesha Hunter was seen and treated in our facility beginning 12/14/2021 until 12/16/2021. She may return to work Saturday 12/18/2021.       Sincerely,       Mary Harrison RN         Signature:__________________________________

## 2021-12-14 NOTE — TELEPHONE ENCOUNTER
Pt's  called stated that pt had possible stroke yesterday while driving home signs and symptoms include acute headache, jaw opening/closing, unable to talk, trigger finger in hand, arm weakness, and slight facial drooping, involuntary leg movement during night. This LPN informed pt's  to take pt to er for evaluation and monitoring immediately due to signs/symptoms and severity of the pt's situation. Pt's  said pt is stubborn and the er are treating pt in the hallway and pt does not want that or to risk Covid this LPN repeated that pt needs to go to the er to be evaluated  said is pt went it would be after work informed SANDHYA Cruz of situation and she recommended we contact pt at work to see if they will go to the er now. This LPN called pt informed her of needing to go to the er per Sudhakar pt said that they could not leave work due to low staffing and losing their job unless they were dying informed pt of the seriousness of a possible stroke and why they need to be seen as soon as possible pt said they didn't want to be treated in the hallway and they would try to go after work this LPN repeated they should go to the er and I would update SANDHYA Cruz on the situation.

## 2021-12-14 NOTE — ED PROVIDER NOTES
CHIEF COMPLAINT  Chief Complaint   Patient presents with    Facial Droop     right. LKW 2000, reports aphasia and headache that has improved. had bariatric surgery 5 weeks ago, not on thinners       NAYELY Oakes is a 28 y.o. female with history of diabetes, depression, chronic pancreatitis, recent bariatric surgery who presents right-sided facial droop, slurred speech, right arm numbness and headache. Yesterday when she was driving she developed severe posterior headache, she then had right facial numbness, facial droop, right arm weakness and twitching at her right index finger. The signs and symptoms started when she was driving at 8 PM. She is presenting at 23 and some hours of signs and symptoms. Her headache resolved shortly after it started but she is had persistent right facial droop and intermittent slurred speech, feel like she is biting the inside of her cheek. She denies any falls or trauma, she denies any fevers or neck stiffness. She denies any vomiting, chest pain.  She denies any history of similar events this in the past.      REVIEW OF SYSTEMS  Review of Systems   History obtained from chart review and the patient  General ROS: negative for - chills or fever  Ophthalmic ROS: negative for - decreased vision or double vision  ENT ROS: positive for - headaches  Hematological and Lymphatic ROS: negative for - bleeding problems or blood clots  Endocrine ROS: negative for - unexpected weight changes  Respiratory ROS: no cough, shortness of breath, or wheezing  Cardiovascular ROS: no chest pain or dyspnea on exertion  Gastrointestinal ROS: no abdominal pain, change in bowel habits, or black or bloody stools  Genito-Urinary ROS: no dysuria, trouble voiding, or hematuria  Musculoskeletal ROS: positive for -transient weakness in the right upper extremity which has resolved  Neurological ROS: positive for -right lower facial droop      PAST MEDICAL HISTORY  Past Medical History:   Diagnosis Date    Depression     [facility]     type 2-diet controlled. \"had gestational diabetes and my hbgA1c was 6.1- off medications for the past 2 yrs now just diet controlled    GERD (gastroesophageal reflux disease)     Group B streptococcal infection during pregnancy      (normal spontaneous vaginal delivery) 7-2-15    delivered in ambulance 12 minutes    Obesity, unspecified     Pancreatitis chronic     Problems with hearing     dr Philomena Dugan; only due to when she had a cold       FAMILY HISTORY  Family History   Adopted: Yes   Problem Relation Age of Onset    Breast Cancer Mother     Ovarian Cancer Mother     No Known Problems Father     Other Sister         autistic (1 sister)    Cancer Brother         eye, testicular       SOCIAL HISTORY  Social History     Socioeconomic History    Marital status:      Spouse name: None    Number of children: None    Years of education: None    Highest education level: None   Occupational History    None   Tobacco Use    Smoking status: Former Smoker     Packs/day: 0.50     Years: 8.00     Pack years: 4.00     Types: Cigarettes     Quit date: 2021     Years since quittin.7    Smokeless tobacco: Never Used   Vaping Use    Vaping Use: Former    Substances: Nicotine (only used 1-2 times; then quit)   Substance and Sexual Activity    Alcohol use: Not Currently     Alcohol/week: 0.0 standard drinks     Comment: socially- average \"one time per week lbut none since started getting ready for [employer]'    Drug use: Never    Sexual activity: Yes     Partners: Male   Other Topics Concern    None   Social History Narrative    None     Social Determinants of Health     Financial Resource Strain:     Difficulty of Paying Living Expenses: Not on file   Food Insecurity:     Worried About Running Out of Food in the Last Year: Not on file    [place] of Food in the Last Year: Not on file   Transportation Needs:     Lack of Transportation (Medical):  Not by mouth every morning (before breakfast) 30 capsule 2         ALLERGIES  Allergies   Allergen Reactions    Wool Alcohol [Lanolin] Hives     \"wool\" ; hives/ blisters       PHYSICAL EXAM  VITAL SIGNS: /69   Pulse 72   Resp 17   Ht 5' 5\" (1.651 m)   Wt 225 lb (102.1 kg)   LMP 2021   SpO2 99%   BMI 37.44 kg/m²   Constitutional: Well developed, Well nourished, seen in bed, active  HENT: Normocephalic, Atraumatic, Bilateral external ears normal, Oropharynx moist, No oral exudates, Nose normal.   Eyes: PERRL, EOMI, Conjunctiva normal, No discharge. Neck: Normal range of motion, Supple, No stridor. Cardiovascular: Normal heart rate, Normal rhythm, No murmurs, No rubs, No gallops. Thorax & Lungs: Normal breath sounds, No respiratory distress, No wheezing, No chest tenderness. Abdomen: Bowel sounds normal, Soft, No tenderness, no guarding, no rebound, No masses, No pulsatile masses. Skin: Warm, Dry, No erythema, No rash. Extremities: Intact distal pulses, No edema, No tenderness, No cyanosis, No clubbing. Musculoskeletal: Good gross range of motion in all major joints. No major deformities noted. Neurologic: Alert & oriented x 3, Normal gross motor function of upper and lower extremities, no pronator drift, no ataxia  NIH Stroke Scale        Person Administering Scale: Nica Lal MD  1a  Level of consciousness: 0=alert; keenly responsive   1b. LOC questions:  0=Performs both tasks correctly   1c. LOC commands: 0=Performs both tasks correctly   2. Best Gaze: 0=normal   3. Visual: 0=No visual loss   4. Facial Palsy: 2=Partial paralysis (total or near total paralysis of the lower face)   5a. Motor left arm: 0=No drift, limb holds 90 (or 45) degrees for full 10 seconds   5b.   Motor right arm: 0=No drift, limb holds 90 (or 45) degrees for full 10 seconds   6a. motor left le=No drift, limb holds 90 (or 45) degrees for full 10 seconds   6b  Motor right le=No drift, limb holds 90 (or 45) degrees for full 10 seconds   7. Limb Ataxia: 0=Absent   8. Sensory: 0=Normal; no sensory loss   9. Best Language:  0=No aphasia, normal   10. Dysarthria: 1=Mild to moderate, patient slurs at least some words and at worst, can be understood with some difficulty   11. Extinction and Inattention: 0=No abnormality   12. Distal motor function: 0=Normal    Total:  3         Psychiatric: Affect normal    EKG  EKG Interpretation    Interpreted by emergency department physician December 14 at 1924    Rhythm: normal sinus   Rate: normal  Axis: normal  Ectopy: none  Conduction: normal  ST Segments: no acute change  T Waves: no acute change  Q Waves: none    Clinical Impression: Normal sinus rhythm with a rate of 69, no ischemia or ectopy    Samuel President, MD      RADIOLOGY/PROCEDURES/LABS  Last Imaging results   XR CHEST PORTABLE   Final Result   No acute abnormality. CTA HEAD NECK W CONTRAST   Final Result   No flow limiting stenosis or large vessel occlusion detected within the head   or neck. RECOMMENDATIONS:   Unavailable         CT HEAD WO CONTRAST   Final Result   Normal noncontrast head CT scan. Results were discussed with Dr. Baker President at 7:16 p.m. Cloteal Dasilva              Imaging reviewed by myself    Labs Reviewed   CBC WITH AUTO DIFFERENTIAL - Abnormal; Notable for the following components:       Result Value    MPV 11.2 (*)     Lymphocytes % 44.3 (*)     Monocytes % 5.9 (*)     All other components within normal limits   POCT GLUCOSE - Normal   COMPREHENSIVE METABOLIC PANEL W/ REFLEX TO MG FOR LOW K   TROPONIN   PROTIME-INR   HCG, SERUM, QUALITATIVE   POCT GLUCOSE         Medications   sodium chloride flush 0.9 % injection 5-40 mL (has no administration in time range)   sodium chloride flush 0.9 % injection 5-40 mL (has no administration in time range)   0.9 % sodium chloride infusion (has no administration in time range)   iopamidol (ISOVUE-370) 76 % injection 90 mL (90 mLs IntraVENous Given 12/14/21 4346)       [address] [facility]  Pertinent Labs & Imaging studies reviewed. (See chart for details)    81-year-old female presents with 23 and some hours of facial droop. She is beyond the IV TPA recommended window and does not have signs of large vessel occlusion suggested that she would benefit from endovascular. tPA not considered for this reason. She will benefit from further evaluation and management as based on the constellation of symptoms I'm concerned for possible atypical CVA. Her CTA does not suggest acute abnormality or ICH. She has regained full range of motion and use of the right upper extremity. She has forehead sparing on facial cranial nerve testing which makes me concerned. Discussed with the hospitalist, who is agreeable to accept care of the patient. Patient agreeable plan of care. CRITICAL CARE NOTE:  There was a high probability of clinically significant life-threatening deterioration of the patient's condition requiring my urgent intervention due to facial droop, concern for CVA. Advanced imaging, consultation with radiology, facilitation of hospitalization, multiple reevaluation was performed to address this. Total critical care time is 35 minutes. This includes vital sign monitoring, pulse oximetry monitoring, telemetry monitoring, clinical response to the IV medications, reviewing the nursing notes, consultation time, dictation/documentation time, and interpretation of the lab work. This time excludes time spent performing procedures and separately billable procedures and family discussion time. FINAL IMPRESSION  Problem List Items Addressed This Visit     None      Visit Diagnoses     Facial droop    -  Primary    Slurred speech          1.    2.   3.    Patient gave me permission to discuss medical history, care, and plan with those present in the room.   Electronically signed by: Samuel Marion MD, 12/14/2021  MD Samuel Gayle MD  12/15/21 010

## 2021-12-15 ENCOUNTER — APPOINTMENT (OUTPATIENT)
Dept: MRI IMAGING | Age: 31
End: 2021-12-15
Payer: COMMERCIAL

## 2021-12-15 PROCEDURE — 6360000002 HC RX W HCPCS: Performed by: SURGERY

## 2021-12-15 PROCEDURE — 94761 N-INVAS EAR/PLS OXIMETRY MLT: CPT

## 2021-12-15 PROCEDURE — 2580000003 HC RX 258: Performed by: EMERGENCY MEDICINE

## 2021-12-15 PROCEDURE — 70551 MRI BRAIN STEM W/O DYE: CPT

## 2021-12-15 PROCEDURE — 96365 THER/PROPH/DIAG IV INF INIT: CPT

## 2021-12-15 PROCEDURE — G0378 HOSPITAL OBSERVATION PER HR: HCPCS

## 2021-12-15 PROCEDURE — 99223 1ST HOSP IP/OBS HIGH 75: CPT | Performed by: SURGERY

## 2021-12-15 PROCEDURE — 2580000003 HC RX 258: Performed by: SURGERY

## 2021-12-15 PROCEDURE — 99205 OFFICE O/P NEW HI 60 MIN: CPT | Performed by: PSYCHIATRY & NEUROLOGY

## 2021-12-15 PROCEDURE — 2500000003 HC RX 250 WO HCPCS: Performed by: SURGERY

## 2021-12-15 PROCEDURE — 6370000000 HC RX 637 (ALT 250 FOR IP): Performed by: INTERNAL MEDICINE

## 2021-12-15 PROCEDURE — 2580000003 HC RX 258: Performed by: INTERNAL MEDICINE

## 2021-12-15 RX ORDER — SODIUM CHLORIDE 0.9 % (FLUSH) 0.9 %
10 SYRINGE (ML) INJECTION PRN
Status: DISCONTINUED | OUTPATIENT
Start: 2021-12-15 | End: 2021-12-16 | Stop reason: HOSPADM

## 2021-12-15 RX ORDER — SODIUM CHLORIDE 0.9 % (FLUSH) 0.9 %
10 SYRINGE (ML) INJECTION EVERY 12 HOURS SCHEDULED
Status: DISCONTINUED | OUTPATIENT
Start: 2021-12-15 | End: 2021-12-16 | Stop reason: HOSPADM

## 2021-12-15 RX ORDER — ACETAMINOPHEN 650 MG/1
650 SUPPOSITORY RECTAL EVERY 6 HOURS PRN
Status: DISCONTINUED | OUTPATIENT
Start: 2021-12-15 | End: 2021-12-16 | Stop reason: HOSPADM

## 2021-12-15 RX ORDER — SODIUM CHLORIDE 9 MG/ML
25 INJECTION, SOLUTION INTRAVENOUS PRN
Status: DISCONTINUED | OUTPATIENT
Start: 2021-12-15 | End: 2021-12-16 | Stop reason: HOSPADM

## 2021-12-15 RX ORDER — POLYETHYLENE GLYCOL 3350 17 G/17G
17 POWDER, FOR SOLUTION ORAL DAILY PRN
Status: DISCONTINUED | OUTPATIENT
Start: 2021-12-15 | End: 2021-12-16 | Stop reason: HOSPADM

## 2021-12-15 RX ORDER — ACETAMINOPHEN 325 MG/1
650 TABLET ORAL EVERY 6 HOURS PRN
Status: DISCONTINUED | OUTPATIENT
Start: 2021-12-15 | End: 2021-12-16 | Stop reason: HOSPADM

## 2021-12-15 RX ORDER — PANTOPRAZOLE SODIUM 40 MG/1
40 TABLET, DELAYED RELEASE ORAL
Status: DISCONTINUED | OUTPATIENT
Start: 2021-12-15 | End: 2021-12-16 | Stop reason: HOSPADM

## 2021-12-15 RX ORDER — ONDANSETRON 4 MG/1
4 TABLET, ORALLY DISINTEGRATING ORAL EVERY 8 HOURS PRN
Status: DISCONTINUED | OUTPATIENT
Start: 2021-12-15 | End: 2021-12-16 | Stop reason: HOSPADM

## 2021-12-15 RX ORDER — ASPIRIN 81 MG/1
81 TABLET ORAL DAILY
Status: DISCONTINUED | OUTPATIENT
Start: 2021-12-16 | End: 2021-12-16

## 2021-12-15 RX ADMIN — SODIUM CHLORIDE, PRESERVATIVE FREE 10 ML: 5 INJECTION INTRAVENOUS at 09:31

## 2021-12-15 RX ADMIN — PANTOPRAZOLE SODIUM 40 MG: 40 TABLET, DELAYED RELEASE ORAL at 06:00

## 2021-12-15 RX ADMIN — FOLIC ACID: 5 INJECTION, SOLUTION INTRAMUSCULAR; INTRAVENOUS; SUBCUTANEOUS at 11:20

## 2021-12-15 RX ADMIN — SODIUM CHLORIDE, PRESERVATIVE FREE 10 ML: 5 INJECTION INTRAVENOUS at 00:56

## 2021-12-15 ASSESSMENT — PAIN SCALES - GENERAL
PAINLEVEL_OUTOF10: 0

## 2021-12-15 ASSESSMENT — ENCOUNTER SYMPTOMS
RESPIRATORY NEGATIVE: 1
GASTROINTESTINAL NEGATIVE: 1

## 2021-12-15 NOTE — CONSULTS
Neurology Service Consult Note  [facility]  Patient Name: John Zamudio  : 1990        Subjective:   Reason for consult: Facial droop  Patient seen and examined. Chart reviewed in detail.    28 y.o. -female with PMH depression, T2DM, GERD, chronic pancreatitis and obesity presenting to [facility] right-sided facial droop, slurred speech, right arm numbness and headache. Per chart review patient was driving and developed a severe headache to posterior head as she rated as 10/10. She then noticed twitching of her right index finger, right facial numbness and right facial droop with right arm weakness. It is documented that her headache resolved shortly after however her right facial droop and intermittent slurred speech persisted. Patient further stated that she felt like she was biting the inside of her cheek. Patient reports after eduardo shopping, started driving when she felt a jolt of pain to center of neck that radiated to top of head . Patient describes this as 10/10 sharp pain associated with vision changes in which everything was enlarged. Patient further states that she felt right arm drop off of steering wheel along with the right index finger twitching in which she  had to take the wheel with her left hand. Patient states that she felt like she was impaired while, as she endorses the inability to properly function her left arm. She further reports that after driving approximately a 2 mile radius going about 55 mph that  all symptoms resolved. Patient denies LOC, and endorses that she was aware of everything in her surroundings. Patient endorses her mouth would not quit moving rhythmically and not as if she bit her inside of cheek. I did appreciate a bite marco located to right inner aspect of lip. Approximately 10 minutes later, she endorses that her speech became slurred and resolved after waking up that next morning.  Upon waking up  sodium chloride flush  5-40 mL IntraVENous 2 times per day     Continuous Infusions:   sodium chloride      sodium chloride       PRN Meds:.sodium chloride flush, sodium chloride, polyethylene glycol, acetaminophen **OR** acetaminophen, sodium chloride flush, sodium chloride    Allergies   Allergen Reactions    Wool Alcohol [Lanolin] Hives     \"wool\" ; hives/ blisters     Social History     Socioeconomic History    Marital status:      Spouse name: Not on file    Number of children: Not on file    Years of education: Not on file    Highest education level: Not on file   Occupational History    Not on file   Tobacco Use    Smoking status: Former Smoker     Packs/day: 0.50     Years: 8.00     Pack years: 4.00     Types: Cigarettes     Quit date: 2021     Years since quittin.7    Smokeless tobacco: Never Used   Vaping Use    Vaping Use: Former    Substances: Nicotine (only used 1-2 times; then quit)   Substance and Sexual Activity    Alcohol use: Not Currently     Alcohol/week: 0.0 standard drinks     Comment: socially- average \"one time per week lbut none since started getting ready for surgeyr'    Drug use: Never    Sexual activity: Yes     Partners: Male   Other Topics Concern    Not on file   Social History Narrative    Not on file     Social Determinants of Health     Financial Resource Strain:     Difficulty of Paying Living Expenses: Not on file   Food Insecurity:     Worried About Running Out of Food in the Last Year: Not on file    Ebenezer of Food in the Last Year: Not on file   Transportation Needs:     Lack of Transportation (Medical): Not on file    Lack of Transportation (Non-Medical):  Not on file   Physical Activity:     Days of Exercise per Week: Not on file    Minutes of Exercise per Session: Not on file   Stress:     Feeling of Stress : Not on file   Social Connections:     Frequency of Communication with Friends and Family: Not on file    Frequency of Social Gatherings with Friends and Family: Not on file    Attends Quaker Services: Not on file    Active Member of Clubs or Organizations: Not on file    Attends Club or Organization Meetings: Not on file    Marital Status: Not on file   Intimate Partner Violence:     Fear of Current or Ex-Partner: Not on file    Emotionally Abused: Not on file    Physically Abused: Not on file    Sexually Abused: Not on file   Housing Stability:     Unable to Pay for Housing in the Last Year: Not on file    Number of [place] in the Last Year: Not on file    Unstable Housing in the Last Year: Not on file      Family History   Adopted: Yes   Problem Relation Age of Onset    Breast Cancer Mother     Ovarian Cancer Mother     No Known Problems Father     Other Sister         autistic (1 sister)    Cancer Brother         eye, testicular         ROS (10 systems)  In addition to that documented in the HPI above, the additional ROS was obtained:  Constitutional: Denies fevers or chills  Eyes: Denies vision changes  ENMT: Denies sore throat  CV: Denies chest pain  Resp: Denies SOB  GI: Denies vomiting or diarrhea  : Denies painful urination  MSK: Denies recent trauma  Skin: Denies new rashes  Neuro: Denies new numbness or tingling or weakness  Endocrine: Denies unexpected weight loss  Heme: Denies bleeding disorders    Physical Exam:       Vitals:    12/15/21 0102 12/15/21 0130 12/15/21 0656 12/15/21 0738   BP: 114/68 124/64 109/62 103/72   Pulse: 68 53 68 53   Resp: 16 16 16 14   Temp:  97.8 °F (36.6 °C) 97.7 °F (36.5 °C) 97.7 °F (36.5 °C)   TempSrc:  Oral Oral Oral   SpO2: 99% 99% 96% 96%   Weight:       Height:           Wt Readings from Last 3 Encounters:   12/14/21 225 lb (102.1 kg)   11/16/21 225 lb 12.8 oz (102.4 kg)   11/09/21 231 lb 1.6 oz (104.8 kg)     Temp Readings from Last 3 Encounters:   12/15/21 97.7 °F (36.5 °C) (Oral)   11/03/21 98.8 °F (37.1 °C) (Oral)   11/01/21 97.7 °F (36.5 °C)     BP Readings from Last 3 Encounters:   12/15/21 103/72   11/16/21 114/74   11/09/21 126/88     Pulse Readings from Last 3 Encounters:   12/15/21 53   11/16/21 80   11/09/21 85        Gen: A&O x 4, NAD, cooperative  HEENT: NC/AT, EOMI, PERRL, mmm, neck supple, no meningeal signs; Heart: Regular   Lungs: Respirations Unlabored  Ext: no edema, no calf tenderness b/l  Psych: normal mood and affect  Skin: no rashes or lesions    NEUROLOGIC EXAM:    Mental Status: A&O to self, location, month and year, NAD, speech clear, language fluent, repetition and naming intact, follows commands appropriately    Cranial Nerve Exam:   CN II-XII: PERRL, VFF, no nystagmus, no gaze paresis, sensation V1-V3 intact b/l, right facial droop; hearing intact to conversational tone, palate elevates symmetrically, shoulder elevation symmetric and tongue protrudes midline with movement side to side. Motor Exam:       Strength 5/5 UE's/LE's b/l  Tone and bulk normal   No pronator drift    Deep Tendon Reflexes: 2/4 biceps, triceps, brachioradialis, patellar, and achilles b/l; flexor plantar responses b/l    Sensation: Intact light touch/vibration UE's/LE's b/l    Coordination/Cerebellum:       Tremors--none      Rapidly alternating movements: no dysdiadochokinesia b/l                Heel-to-Shin: no dysmetria b/l      Finger-to-Nose: no dysmetria b/l    Gait and stance:      Gait: deferred      LABS:        CBC:   Recent Labs     12/14/21  1900   WBC 9.8   RBC 4.73   HGB 13.8   HCT 42.6      MCV 90.1     BMP:    Recent Labs     12/14/21  1900      K 3.9      CO2 25   BUN 10   CREATININE 0.7   GLUCOSE 90   CALCIUM 10.1       IMAGING:    CTH  Impression   Normal noncontrast head CT scan. CTA of head/neck  Impression   No flow limiting stenosis or large vessel occlusion detected within the head   or neck. MRI brain ordered  EEG ordered     Above imaging reviewed in detail.   ASSESSMENT/PLAN:   60-year-old female patient with past medical history stated as above presents to [facility] with complaints of  right-sided facial droop, slurred speech, right arm numbness and headache. Headache resolved shortly after onset, however right facial droop and slurred speech still remained. On exam patient states that she feels back to baseline with the exception of right facial numbness and right facial droop. Patient denies history of seizure, as I am concerned for possible seizure as she indicates that she had an abnormal movement of her mouth and twitching to right index finger and biting her lip. See plan of care below    1. Strokelike symptoms possibly due to TIA versus acute intracranial ischemia  Stroke risk factors; T2 DM, HLD, obesity, tobacco abuse recent history  Medications  Therapies: PT/OT/ST further recommendations    2. Right index finger twitching, lip biting and abnormal mouth movement possibly due to partial focal seizure. Neurodiagnostic  CT as above  CTA as above  MRI ordered  EEG ordered    Patient discussed with attending physician Sydeny Ramirez    Thank you for allowing us to participate in the care of your patient. If there are any questions regarding evaluation please feel free to contact us. CARLEE Casanova - CNP, 12/15/2021      ------------------------------------    Attending Note:  I have rounded on this patient with Licha Blood NP. I have reviewed the chart and we have discussed this case in detail. The patient was seen and examined by myself. Pertinent labs and imaging have been personally reviewed. Our findings and impressions were discussed with the patient. I concur with the Nurse Practitioner's assessment and plan. She continues to have slight right facial asymmetry. This has improved since admission she states. Her MRI of the brain overall looks normal.  There was a questionable area in the left aspect of the medulla. This was not all that impressive upon my review.   It would not necessarily correlate with her present symptoms, though. Sequence of events is not overly suggestive of TIA. I will order a contrasted MRI to rule out any other possible structural etiologies for seizure and to assess this abnormality in better detail. An EEG is pending at this time. Interestingly, the patient is adopted and her daughter has a seizure disorder. We will continue to follow.     Tee Alicea DO 12/15/2021 10:22 PM

## 2021-12-15 NOTE — H&P
[facility] History and Physical      CHIEF COMPLAINT:  droop    History of Present Illness: 81-year-old female with a history of morbid obesity status post gastric sleeve a month ago, GERD on Prilosec. Last night while driving around 8 PM she developed a sharp pain in the right side of her neck that radiated forward to the right part of her forehead. With this she noticed a change in her vision that she describes as looking through a kaleidoscope. She did have dyskinetic movements in her lip and her right finger. Awake throughout this time. When she went home she noticed a facial droop on the right. Today she still has persistent pain but mainly in her neck. Her facial droop has improved throughout the day. She was able to work with no issues. Presented to ED for further evaluation. CT head and CTA was negative for any abnormality. No prior occurrence. No change in Prilosec dose. When she looked through a camera she believes she has persistent mild droop. REVIEW OF SYSTEMS:  A comprehensive 14 point review of systems was negative except for: what is in the HPI    PMH:  Past Medical History:   Diagnosis Date    Depression     Diabetes mellitus ([address]     type 2-diet controlled. \"had gestational diabetes and my hbgA1c was 6.1- off medications for the past 2 yrs now just diet controlled    GERD (gastroesophageal reflux disease)     Group B streptococcal infection during pregnancy      (normal spontaneous vaginal delivery) 7-2-15    delivered in ambulance 12 minutes    Obesity, unspecified     Pancreatitis chronic     Problems with hearing     dr Boris Aden; only due to when she had a cold       Surgical History:  Past Surgical History:   Procedure Laterality Date    ENDOSCOPY, COLON, DIAGNOSTIC      HEMORRHOID SURGERY      x3- last time 2019    SLEEVE GASTRECTOMY N/A 2021    GASTRECTOMY SLEEVE LAPAROSCOPIC ROBOTIC performed by Kristine Mancilla MD at [place] [address] TUBAL LIGATION  2015 August    UPPER GASTROINTESTINAL ENDOSCOPY N/A 7/20/2021    EGD BIOPSY performed by Olamide Casillas MD at [address] [facility]  2015    reconstruction between vag/anal area from childbirth       Medications Prior to Admission:    Prior to Admission medications    Medication Sig Start Date End Date Taking? Authorizing Provider   ondansetron (ZOFRAN) 4 MG tablet Take 4 mg by mouth every 8 hours as needed for Nausea or Vomiting    Historical Provider, MD   metoclopramide (REGLAN) 10 MG tablet Take 1 tablet by mouth 3 times daily as needed (nausea or vomiting) 11/2/21   Lizet Rodriguez MD   omeprazole (PRILOSEC) 40 MG delayed release capsule Take 1 capsule by mouth every morning (before breakfast) 7/21/21   Olamide Casillas MD       Allergies:    Wool alcohol [lanolin]    Social History:    reports that she quit smoking about 9 months ago. Her smoking use included cigarettes. She has a 4.00 pack-year smoking history. She has never used smokeless tobacco. She reports previous alcohol use. She reports that she does not use drugs. Family History:   family history includes Breast Cancer in her mother; Cancer in her brother; No Known Problems in her father; Other in her sister; Ovarian Cancer in her mother. She was adopted.        PHYSICAL EXAM:  Vitals:  /69   Pulse 72   Resp 17   Ht 5' 5\" (1.651 m)   Wt 225 lb (102.1 kg)   LMP 12/01/2021   SpO2 99%   BMI 37.44 kg/m²   General Appearance: alert and oriented to person, place and time and in no acute distress  Skin: warm and dry, turgor not diminished  Head: normocephalic and atraumatic  Eyes: pupils equal, round, and reactive to light, extraocular eye movements intact, conjunctivae normal  Neck: neck supple and non tender without mass   Pulmonary/Chest: clear to auscultation bilaterally- no wheezes, rales or rhonchi, normal air movement, no respiratory distress  Cardiovascular: normal rate, normal S1 and S2 and no M/R/R  Abdomen: soft, non-tender, non-distended, normal bowel sounds, no masses or organomegaly  Extremities: no cyanosis, no clubbing and no edema  Neurologic: no cranial nerve deficit and speech normal.?  Mild droop on the right. LABS:  Recent Labs     12/14/21 1859 12/14/21 1900   NA  --  143   K  --  3.9   CL  --  103   CO2  --  25   BUN  --  10   CREATININE  --  0.7   GLUCOSE 96 90   CALCIUM  --  10.1       Recent Labs     12/14/21 1900   WBC 9.8   RBC 4.73   HGB 13.8   HCT 42.6   MCV 90.1   MCH 29.2   MCHC 32.4   RDW 14.1      MPV 11.2*       Recent Labs     12/14/21 1859   POCGLU 96       Radiology:   XR CHEST PORTABLE   Final Result   No acute abnormality. CTA HEAD NECK W CONTRAST   Final Result   No flow limiting stenosis or large vessel occlusion detected within the head   or neck. RECOMMENDATIONS:   Unavailable         CT HEAD WO CONTRAST   Final Result   Normal noncontrast head CT scan. Results were discussed with Dr. Baker President at 7:16 p.m. .             ASSESSMENT/PLAN:  Right-sided facial droop  Movement disorder: Dyskinetic movements  Unclear etiology. Not taking Reglan anymore and description not consistent with tardive dyskinesia. Consult neurology  Neurochecks  Check MRI of the brain    Morbid obesity status post gastric sleeve      NOTE: This report was transcribed using voice recognition software. Every effort was made to ensure accuracy; however, inadvertent computerized transcription errors may be present.   Electronically signed by Anmol Hill MD on 12/14/2021 at 9:55 PM

## 2021-12-15 NOTE — ED NOTES
Dr. Edelmira Wood states no need for telestroke outside of window.       Earnest Reyes RN  12/14/21 9849

## 2021-12-15 NOTE — CONSULTS
Department of General Surgery   Surgical Service Dr. Judson Lamar   Consult Note    Date of Consult: 12/15/21    Reason for Consult:  Recent sleeve gastrectomy (21) and new neurologic symptoms    CHIEF COMPLAINT:  New onset neurologic symptoms    History Obtained From:  patient, electronic medical record    HISTORY OF PRESENT ILLNESS:      The patient is a 28 y.o. female who presented to the ED with complaints described as:      Location: right neck pain then headache and vision changes  Quality: denies  Severity: denies on 10 pt scale  Duration: Started 8 pm Monday night  Timing: Acute  Context: No previous neurologic symptoms  Modifying factors: symptoms did improve with time  Associated signs and symptoms: denies    Pt did not report to ED when symptoms happened. She slept and then the next morning her  noticed facial droop. They spoke with PCP MA and MA recommended pt go directly to ED at that time; however, pt didn't go until later that evening. In ED pt was worked up with labs and imaging. Our office was notified by the patient's  of the ED visit. Past Medical History:    Past Medical History:   Diagnosis Date    Depression     Diabetes mellitus ([address]     type 2-diet controlled. \"had gestational diabetes and my hbgA1c was 6.1- off medications for the past 2 yrs now just diet controlled    GERD (gastroesophageal reflux disease)     Group B streptococcal infection during pregnancy      (normal spontaneous vaginal delivery) 7-2-15    delivered in ambulance 12 minutes    Obesity, unspecified     Pancreatitis chronic     Problems with hearing     dr Alisa Carrera; only due to when she had a cold       Past Surgical History:    Past Surgical History:   Procedure Laterality Date    ENDOSCOPY, COLON, DIAGNOSTIC      HEMORRHOID SURGERY      x3- last time 2019    SLEEVE GASTRECTOMY N/A 2021    GASTRECTOMY SLEEVE LAPAROSCOPIC ROBOTIC performed by Jeffery Rayo MD at [place] [address] TUBAL LIGATION  2015    UPPER GASTROINTESTINAL ENDOSCOPY N/A 2021    EGD BIOPSY performed by Ondina Martines MD at [address] [facility]  2015    reconstruction between vag/anal area from childbirth       Current Medications:   Current Facility-Administered Medications   Medication Dose Route Frequency Provider Last Rate Last Admin    pantoprazole (PROTONIX) tablet 40 mg  40 mg Oral QAM AC Fco Ocampo MD   40 mg at 12/15/21 0600    sodium chloride flush 0.9 % injection 10 mL  10 mL IntraVENous 2 times per day Ivonne Newman MD        sodium chloride flush 0.9 % injection 10 mL  10 mL IntraVENous PRN Fco Ocampo MD        0.9 % sodium chloride infusion  25 mL IntraVENous PRN Fco Ocampo MD        polyethylene glycol (GLYCOLAX) packet 17 g  17 g Oral Daily PRN Ivonne Newman MD        acetaminophen (TYLENOL) tablet 650 mg  650 mg Oral Q6H PRN Fco Ocampo MD        Or    acetaminophen (TYLENOL) suppository 650 mg  650 mg Rectal Q6H PRN Fco Ocampo MD        sodium chloride 0.9 % 0,904 mL with folic acid 1 mg, adult multi-vitamin with vitamin k 10 mL, thiamine 100 mg   IntraVENous Once Jonathan Maynard II, MD        sodium chloride flush 0.9 % injection 5-40 mL  5-40 mL IntraVENous 2 times per day Saeid Estevez MD   10 mL at 12/15/21 0056    sodium chloride flush 0.9 % injection 5-40 mL  5-40 mL IntraVENous PRN Saeid Estevez MD        0.9 % sodium chloride infusion  25 mL IntraVENous PRN Saeid Estevez MD           Allergies:  Wool alcohol [lanolin]    Social History:   Social History     Socioeconomic History    Marital status:      Spouse name: None    Number of children: None    Years of education: None    Highest education level: None   Occupational History    None   Tobacco Use    Smoking status: Former Smoker     Packs/day: 0.50     Years: 8.00     Pack years: 4.00     Types: Cigarettes     Quit date: 2021     Years since quittin.7    Smokeless tobacco: Never Used   Vaping Use    Vaping Use: Former    Substances: Nicotine (only used 1-2 times; then quit)   Substance and Sexual Activity    Alcohol use: Not Currently     Alcohol/week: 0.0 standard drinks     Comment: socially- average \"one time per week lbut none since started getting ready for surgeyr'    Drug use: Never    Sexual activity: Yes     Partners: Male   Other Topics Concern    None   Social History Narrative    None     Social Determinants of Health     Financial Resource Strain:     Difficulty of Paying Living Expenses: Not on file   Food Insecurity:     Worried About Running Out of Food in the Last Year: Not on file    [place] of Food in the Last Year: Not on file   Transportation Needs:     Lack of Transportation (Medical): Not on file    Lack of Transportation (Non-Medical):  Not on file   Physical Activity:     Days of Exercise per Week: Not on file    Minutes of Exercise per Session: Not on file   Stress:     Feeling of Stress : Not on file   Social Connections:     Frequency of Communication with Friends and Family: Not on file    Frequency of Social Gatherings with Friends and Family: Not on file    Attends [employer] Services: Not on file    Active Member of [address] [place] [address] [employer] or Organizations: Not on file    Attends Club or Organization Meetings: Not on file    Marital Status: Not on file   Intimate Partner Violence:     Fear of Current or Ex-Partner: Not on file    Emotionally Abused: Not on file    Physically Abused: Not on file    Sexually Abused: Not on file   Housing Stability:     Unable to Pay for Housing in the Last Year: Not on file    Number of [place] in the Last Year: Not on file    Unstable Housing in the Last Year: Not on file       Family History:   Family History   Adopted: Yes   Problem Relation Age of Onset    Breast Cancer Mother     Ovarian Cancer Mother     No Known Problems Father     Other Sister         autistic (1 sister)    Cancer Brother         eye, testicular       REVIEW OFSYSTEMS:    Review of Systems   Constitutional: Positive for activity change. Negative for fever. HENT: Negative. Eyes: Positive for visual disturbance. Respiratory: Negative. Cardiovascular: Negative. Gastrointestinal: Negative. Endocrine: Negative. Genitourinary: Negative. Musculoskeletal: Negative. Skin: Negative. Neurological: Positive for weakness and headaches. Psychiatric/Behavioral: Negative. PHYSICAL EXAM:  Vitals:    12/15/21 0102 12/15/21 0130 12/15/21 0656 12/15/21 0738   BP: 114/68 124/64 109/62 103/72   Pulse: 68 53 68 53   Resp: 16 16 16 14   Temp:  97.8 °F (36.6 °C) 97.7 °F (36.5 °C) 97.7 °F (36.5 °C)   TempSrc:  Oral Oral Oral   SpO2: 99% 99% 96% 96%   Weight:       Height:           Physical Exam  Vitals reviewed. Constitutional:       General: She is not in acute distress. Appearance: She is obese. She is not ill-appearing, toxic-appearing or diaphoretic. HENT:      Head: Normocephalic and atraumatic. Right Ear: External ear normal.      Left Ear: External ear normal.      Nose: Nose normal.   Eyes:      General:         Right eye: No discharge. Left eye: No discharge. Extraocular Movements: Extraocular movements intact. Cardiovascular:      Rate and Rhythm: Normal rate. Pulmonary:      Effort: No respiratory distress. Abdominal:      Palpations: Abdomen is soft. Tenderness: There is no abdominal tenderness. Comments: Previous incisions are well-healed. S, NT, ND, no PS. Musculoskeletal:         General: No swelling. Cervical back: Normal range of motion. Skin:     General: Skin is warm. Neurological:      Mental Status: She is alert.       Comments: Slight facial droop, but no other CN defects identified   Psychiatric:         Mood and Affect: Mood normal.           DATA:    CBC:   Lab Results   Component Value Date    WBC 9.8 12/14/2021    RBC 4.73 12/14/2021    HGB 13.8 12/14/2021    HCT 42.6 12/14/2021    MCV 90.1 12/14/2021    MCH 29.2 12/14/2021    MCHC 32.4 12/14/2021    RDW 14.1 12/14/2021     12/14/2021    MPV 11.2 12/14/2021     CBC with Differential:    Lab Results   Component Value Date    WBC 9.8 12/14/2021    RBC 4.73 12/14/2021    HGB 13.8 12/14/2021    HCT 42.6 12/14/2021     12/14/2021    MCV 90.1 12/14/2021    MCH 29.2 12/14/2021    MCHC 32.4 12/14/2021    RDW 14.1 12/14/2021    SEGSPCT 47.6 12/14/2021    LYMPHOPCT 44.3 12/14/2021    MONOPCT 5.9 12/14/2021    EOSPCT 0.9 04/24/2012    BASOPCT 0.4 12/14/2021    MONOSABS 0.6 12/14/2021    LYMPHSABS 4.3 12/14/2021    EOSABS 0.2 12/14/2021    BASOSABS 0.0 12/14/2021    DIFFTYPE AUTOMATED DIFFERENTIAL 12/14/2021     WBC:    Lab Results   Component Value Date    WBC 9.8 12/14/2021     Platelets:    Lab Results   Component Value Date     12/14/2021     Hemoglobin/Hematocrit:    Lab Results   Component Value Date    HGB 13.8 12/14/2021    HCT 42.6 12/14/2021     CMP:    Lab Results   Component Value Date     12/14/2021    K 3.9 12/14/2021     12/14/2021    CO2 25 12/14/2021    BUN 10 12/14/2021    CREATININE 0.7 12/14/2021    GFRAA >60 12/14/2021    LABGLOM >60 12/14/2021    GLUCOSE 90 12/14/2021    PROT 7.6 12/14/2021    PROT 8.6 06/14/2011    LABALBU 4.8 12/14/2021    CALCIUM 10.1 12/14/2021    BILITOT 0.3 12/14/2021    ALKPHOS 89 12/14/2021    AST 22 12/14/2021    ALT 18 12/14/2021     BMP:    Lab Results   Component Value Date     12/14/2021    K 3.9 12/14/2021     12/14/2021    CO2 25 12/14/2021    BUN 10 12/14/2021    LABALBU 4.8 12/14/2021    CREATININE 0.7 12/14/2021    CALCIUM 10.1 12/14/2021    GFRAA >60 12/14/2021    LABGLOM >60 12/14/2021    GLUCOSE 90 12/14/2021     Sodium:    Lab Results   Component Value Date     12/14/2021     Potassium:    Lab Results   Component Value Date    K 3.9 12/14/2021     BUN/Creatinine:    Lab Results Component Value Date    BUN 10 12/14/2021    CREATININE 0.7 12/14/2021     Hepatic Function Panel:    Lab Results   Component Value Date    ALKPHOS 89 12/14/2021    ALT 18 12/14/2021    AST 22 12/14/2021    PROT 7.6 12/14/2021    PROT 8.6 06/14/2011    BILITOT 0.3 12/14/2021    BILIDIR 0.1 12/03/2011    IBILI 0.2 12/03/2011    LABALBU 4.8 12/14/2021     Albumin:    Lab Results   Component Value Date    LABALBU 4.8 12/14/2021     Calcium:    Lab Results   Component Value Date    CALCIUM 10.1 12/14/2021     Ionized Calcium:  No results found for: IONCA  Magnesium:  No results found for: MG  Phosphorus:  No results found for: PHOS  LDH:  No results found for: LDH  Uric Acid:  No results found for: LABURIC, URICACID  PT/INR:    Lab Results   Component Value Date    PROTIME 12.7 12/14/2021    INR 0.98 12/14/2021     Warfarin PT/INR:  No components found for: Jean Paul Stalisbet  PTT:    Lab Results   Component Value Date    APTT 27.8 11/08/2017   [APTT}  Troponin:  No results found for: TROPONINI  Last 3 Troponin:  No results found for: TROPONINI  U/A:    Lab Results   Component Value Date    COLORU YELLOW 11/16/2020    PROTEINU TRACE 11/16/2020    PHUR 1.025 05/12/2015    WBCUA 1 11/16/2020    RBCUA NO CELLS SEEN 11/16/2020    MUCUS OCCASIONAL 01/18/2018    TRICHOMONAS NONE SEEN 01/18/2018    YEAST OCCASIONAL 02/23/2014    BACTERIA NEGATIVE 11/16/2020    CLARITYU CLEAR 11/16/2020    SPECGRAV 1.015 11/16/2020    LEUKOCYTESUR NEGATIVE 11/16/2020    UROBILINOGEN 0.2 11/16/2020    BILIRUBINUR NEGATIVE 11/16/2020    BLOODU NEGATIVE 11/16/2020    GLUCOSEU Negative 05/12/2015     ABG:  No results found for: PH, PCO2, PO2, HCO3, BE, THGB, TCO2, O2SAT  VBG:  No results found for: PHVEN, BFG8GOF, BEVEN, U0IFGSFA  HgBA1c:    Lab Results   Component Value Date    LABA1C 6.0 10/27/2021     Microalbumen/Creatinine ratio:  No components found for: RUCREAT  FLP:    Lab Results   Component Value Date    TRIG 210 04/22/2013    HDL 30 04/22/2013    LDLDIRECT 114 04/22/2013     TSH:  No results found for: TSH  VITAMIN B12: No components found for: B12  FOLATE:  No results found for: FOLATE  IRON:  No results found for: IRON  Iron Saturation:  No components found for: PERCENTFE  TIBC:  No results found for: TIBC  FERRITIN:  No results found for: FERRITIN  RPR:  No results found for: RPR  HIV:  No results found for: HIV  ZAKIYA:  No results found for: ANATITER, ZAKIYA  RF:  No results found for: RF  DSDNA:  No components found for: DNA  AMYLASE:    Lab Results   Component Value Date    AMYLASE 53 04/20/2012     LIPASE:    Lab Results   Component Value Date    LIPASE 20 08/02/2017     Fibrinogen Level:  No components found for: FIB  Urine Toxicology:  No components found for: IAMMENTA, IBARBIT, IBENZO, ICOCAINE, IMARTHC, IOPIATES, IPHENCYC  24 Hour Urine for Protein:  No components found for: RAWUPRO, UHRS3, [id number], UTV3  24 Hour Urine for Creatinine Clearance:  No components found for: CREAT4, UHRS10, UTV10  PSA: No results found for: PSA    CT head:  Normal noncontrast head CT scan. CTA head and neck:  No flow limiting stenosis or large vessel occlusion detected within the head   or neck. CXR:  No acute abnormality. IMPRESSION:        Patient Active Problem List:     Weight gain     Pre-diabetes     Lesion of buccal mucosa     Tobacco use     Obesity     Moderate episode of recurrent major depressive disorder (HCC)     Anxiety     Positive depression screening     Generalized body aches     Viral URI     Morbid obesity with BMI of 40.0-44.9, adult (HCC)     Complex third degree hemorrhoid     Morbid obesity ([facility] [address]     Drooping eyelid      29 y/o F with hx of sleeve gastrectomy on 11/1/21 now with some neurologic symptoms    PLAN:    -Reviewed labs and images. So far, no acute abnormalities on her studies.   -Will check vitamin labs. Ordered.   -Start banana bag (specifically thiamine) given neuro symptoms.  Will also check thiamine level.  -Bariatric fulls diet. Will also order protein supplements.   -Plan d/w pt and answered all questions. -F/u Neuro eval and MRI brain.          Melvin Mike MD

## 2021-12-15 NOTE — PROGRESS NOTES
Hospitalist Progress Note      Name:  Carlton Taylor /Age/Sex: 1990  (28 y.o. female)   MRN & CSN:  [id number] & [id number] Admission Date/Time: 2021  6:56 PM   [facility] PCP: Doug Grover [facility] Day: 2    Assessment and Plan:   Carlton Taylor is a 28 y.o.  female  who presents with right-sided facial droop and dyskinetic movements. Right-sided facial droop  Dyskinetic movements  - with associated right-sided neck/facial pain, vision changes   -Symptoms mostly resolved on admission  -CT head with no acute process, CTA head and neck with no acute process  -Unclear etiology, is no longer taking Reglan and it is not consistent with tardive dyskinesia  - MRI brain pending   - neurology consulted, appreciate recommendations    Recent bariatric surgery  -Status post sleeve gastrectomy 2021  -Dr. Leopold Baas evaluated, started on vitamin supplementation and vitamin levels ordered  -vitamin deficiencies could be contributing to above    This patient was seen and examined autonomously  A hospitalist attending physician was available for questions/consultation as needed. Diet BARIATRIC DIET; Bariatric Full Liquid  ADULT ORAL NUTRITION SUPPLEMENT; AM Snack, PM Snack; Low Calorie/High Protein Oral Supplement   DVT Prophylaxis [] Lovenox, []  Heparin, [] SCDs, [x] Ambulation   GI Prophylaxis [] PPI,  [] H2 Blocker,  [] Carafate,  [x] Diet/Tube Feeds   Code Status Full Code   Disposition Patient requires continued admission due to awaiting MRI brain and neuro consult      History of Present Illness:     Chief Complaint:   Carlton Taylor is a 28 y.o.  female  who presents with right-sided facial droop, dyskinetic movements. Patient seen and examined at bedside. She states she is feeling better this morning. Still feels that her right side of her face is somewhat droopy. Denies any history of TIA, stroke, seizure or migraines.  Discussed plan of care for today and continuing MRI brain and neuro consult, patient is agreeable       Ten point ROS reviewed negative, unless as noted above    Objective: Intake/Output Summary (Last 24 hours) at 12/15/2021 1337  Last data filed at 12/15/2021 0931  Gross per 24 hour   Intake 60 ml   Output    Net 60 ml      Vitals:   Vitals:    12/15/21 1051   BP:    Pulse:    Resp:    Temp:    SpO2: 96%     Physical Exam:     GEN Awake female, sitting upright in bed in no apparent distress. Appears given age. EYES Pupils are equally round. No scleral erythema, discharge, or conjunctivitis. HENT Mucous membranes are moist.  NECK Supple, no apparent thyromegaly or masses. RESP Clear to auscultation, no wheezes, rales or rhonchi. Respirations even and unlabored on RA. CARDIO/VASC   S1/S2 auscultated. Regular rate without appreciable murmurs, rubs, or gallops. Peripheral pulses equal bilaterally and palpable. No peripheral edema. GI Abdomen is soft without significant tenderness, masses, or guarding. Bowel sounds are normoactive.   Alcantara catheter is not present. MSK No gross joint deformities. SKIN Normal coloration, warm, dry. NEURO mild right-sided facial droop,  strength of right upper extremity 4 out of 5, otherwise no focal neurologic deficits  PSYCH Awake, alert, oriented x 4. Affect appropriate.     Medications:   Medications:    pantoprazole  40 mg Oral QAM AC    sodium chloride flush  10 mL IntraVENous 2 times per day    sodium chloride flush  5-40 mL IntraVENous 2 times per day      Infusions:    sodium chloride      sodium chloride       PRN Meds: sodium chloride flush, 10 mL, PRN  sodium chloride, 25 mL, PRN  polyethylene glycol, 17 g, Daily PRN  acetaminophen, 650 mg, Q6H PRN   Or  acetaminophen, 650 mg, Q6H PRN  sodium chloride flush, 5-40 mL, PRN  sodium chloride, 25 mL, PRN          Electronically signed by Duyen Brian PA-C on 12/15/2021 at 1:37 PM

## 2021-12-16 ENCOUNTER — APPOINTMENT (OUTPATIENT)
Dept: MRI IMAGING | Age: 31
End: 2021-12-16
Payer: COMMERCIAL

## 2021-12-16 VITALS
HEIGHT: 65 IN | WEIGHT: 225 LBS | RESPIRATION RATE: 17 BRPM | TEMPERATURE: 98.3 F | HEART RATE: 56 BPM | SYSTOLIC BLOOD PRESSURE: 109 MMHG | BODY MASS INDEX: 37.49 KG/M2 | DIASTOLIC BLOOD PRESSURE: 61 MMHG | OXYGEN SATURATION: 95 %

## 2021-12-16 LAB
CHOLESTEROL: 204 MG/DL
FOLATE: >20 NG/ML (ref 3.1–17.5)
HDLC SERPL-MCNC: 24 MG/DL
LDL CHOLESTEROL DIRECT: 127 MG/DL
MAGNESIUM: 1.8 MG/DL (ref 1.8–2.4)
TRIGL SERPL-MCNC: 225 MG/DL
VITAMIN B-12: 1077 PG/ML (ref 211–911)
VITAMIN D 25-HYDROXY: 20.67 NG/ML

## 2021-12-16 PROCEDURE — A9579 GAD-BASE MR CONTRAST NOS,1ML: HCPCS | Performed by: PSYCHIATRY & NEUROLOGY

## 2021-12-16 PROCEDURE — 99225 PR SBSQ OBSERVATION CARE/DAY 25 MINUTES: CPT | Performed by: CLINICAL NURSE SPECIALIST

## 2021-12-16 PROCEDURE — 80061 LIPID PANEL: CPT

## 2021-12-16 PROCEDURE — 84425 ASSAY OF VITAMIN B-1: CPT

## 2021-12-16 PROCEDURE — 82746 ASSAY OF FOLIC ACID SERUM: CPT

## 2021-12-16 PROCEDURE — 2580000003 HC RX 258: Performed by: INTERNAL MEDICINE

## 2021-12-16 PROCEDURE — 95816 EEG AWAKE AND DROWSY: CPT | Performed by: PSYCHIATRY & NEUROLOGY

## 2021-12-16 PROCEDURE — 83735 ASSAY OF MAGNESIUM: CPT

## 2021-12-16 PROCEDURE — 70552 MRI BRAIN STEM W/DYE: CPT

## 2021-12-16 PROCEDURE — 95819 EEG AWAKE AND ASLEEP: CPT

## 2021-12-16 PROCEDURE — 2580000003 HC RX 258: Performed by: EMERGENCY MEDICINE

## 2021-12-16 PROCEDURE — G0378 HOSPITAL OBSERVATION PER HR: HCPCS

## 2021-12-16 PROCEDURE — 84630 ASSAY OF ZINC: CPT

## 2021-12-16 PROCEDURE — 6370000000 HC RX 637 (ALT 250 FOR IP): Performed by: PHYSICIAN ASSISTANT

## 2021-12-16 PROCEDURE — 82306 VITAMIN D 25 HYDROXY: CPT

## 2021-12-16 PROCEDURE — 83721 ASSAY OF BLOOD LIPOPROTEIN: CPT

## 2021-12-16 PROCEDURE — 94761 N-INVAS EAR/PLS OXIMETRY MLT: CPT

## 2021-12-16 PROCEDURE — 6360000004 HC RX CONTRAST MEDICATION: Performed by: PSYCHIATRY & NEUROLOGY

## 2021-12-16 PROCEDURE — 6370000000 HC RX 637 (ALT 250 FOR IP): Performed by: INTERNAL MEDICINE

## 2021-12-16 PROCEDURE — 82607 VITAMIN B-12: CPT

## 2021-12-16 RX ORDER — PREDNISONE 20 MG/1
60 TABLET ORAL DAILY
Qty: 15 TABLET | Refills: 0 | Status: SHIPPED | OUTPATIENT
Start: 2021-12-16 | End: 2021-12-21

## 2021-12-16 RX ORDER — PREDNISONE 20 MG/1
60 TABLET ORAL DAILY
Status: DISCONTINUED | OUTPATIENT
Start: 2021-12-16 | End: 2021-12-16 | Stop reason: HOSPADM

## 2021-12-16 RX ORDER — ASPIRIN 81 MG/1
81 TABLET, CHEWABLE ORAL DAILY
Status: DISCONTINUED | OUTPATIENT
Start: 2021-12-16 | End: 2021-12-16 | Stop reason: HOSPADM

## 2021-12-16 RX ORDER — ASPIRIN 81 MG/1
81 TABLET, CHEWABLE ORAL DAILY
Qty: 30 TABLET | Refills: 3 | Status: SHIPPED | OUTPATIENT
Start: 2021-12-16

## 2021-12-16 RX ADMIN — ONDANSETRON 4 MG: 4 TABLET, ORALLY DISINTEGRATING ORAL at 00:43

## 2021-12-16 RX ADMIN — SODIUM CHLORIDE, PRESERVATIVE FREE 10 ML: 5 INJECTION INTRAVENOUS at 08:22

## 2021-12-16 RX ADMIN — PREDNISONE 60 MG: 20 TABLET ORAL at 17:08

## 2021-12-16 RX ADMIN — PANTOPRAZOLE SODIUM 40 MG: 40 TABLET, DELAYED RELEASE ORAL at 06:32

## 2021-12-16 RX ADMIN — GADOTERIDOL 20 ML: 279.3 INJECTION, SOLUTION INTRAVENOUS at 08:51

## 2021-12-16 RX ADMIN — ASPIRIN 81 MG CHEWABLE TABLET 81 MG: 81 TABLET CHEWABLE at 17:08

## 2021-12-16 ASSESSMENT — PAIN SCALES - GENERAL: PAINLEVEL_OUTOF10: 0

## 2021-12-16 NOTE — DISCHARGE SUMMARY
tolerated  Disposition: Discharged to:   [x]Home, []C, []SNF, []Acute Rehab, []Hospice   Condition on discharge: Stable    Discharge Medications:        Medication List      ASK your doctor about these medications    metoclopramide 10 MG tablet  Commonly known as: REGLAN  Take 1 tablet by mouth 3 times daily as needed (nausea or vomiting)     omeprazole 40 MG delayed release capsule  Commonly known as: PRILOSEC  Take 1 capsule by mouth every morning (before breakfast)     ondansetron 4 MG tablet  Commonly known as: ZOFRAN            Objective Findings at Discharge:   /61   Pulse 56   Temp 98.3 °F (36.8 °C)   Resp 17   Ht 5' 5\" (1.651 m)   Wt 225 lb (102.1 kg)   LMP 12/01/2021   SpO2 95%   BMI 37.44 kg/m²            PHYSICAL EXAM   GEN Awake female, sitting upright in bed in no apparent distress. Appears given age. Obese. EYES Pupils are equally round. Gaze conjugate. HENT Mucous membranes are moist.   NECK Supple, no apparent thyromegaly or masses. RESP Respirations even and unlabored on RA. CARDIO/VASC S1/S2 auscultated. Regular rate without appreciable murmurs, rubs, or gallops. GI Abdomen is soft without significant tenderness, masses, or guarding.   Alcantara catheter is not present. HEME/LYMPH No petechiae or ecchymoses. MSK No gross joint deformities. SKIN Normal coloration, warm, dry. NEURO mild right-sided facial droop, strength in right upper extremity 4 out of 5, full strength in other extremities, no further focal deficits  PSYCH Awake, alert, oriented x 4. Affect appropriate.     BMP/CBC  Recent Labs     12/14/21  1900      K 3.9      CO2 25   BUN 10   CREATININE 0.7   WBC 9.8   HCT 42.6          IMAGING:  MRI BRAIN W CONTRAST  Narrative: EXAMINATION:  MRI OF THE BRAIN WITH CONTRAST  12/16/2021 8:49 am    TECHNIQUE:  Multiplanar multisequence MRI of the head/brain was performed with the  administration of intravenous contrast.    COMPARISON:  Noncontrast MRI 12/15/2021. HISTORY:  ORDERING SYSTEM PROVIDED HISTORY: ? abnormal MRI, right facial droop  TECHNOLOGIST PROVIDED HISTORY:  Reason for exam:->? abnormal MRI, right facial droop  Is the patient pregnant?->No  Reason for Exam: ? abnormal MRI, right facial droop  Relevant Medical/Surgical History: 20mL prohance; GFR >60    FINDINGS:  This exam was performed as an adjunct to the noncontrast MRI from 12/15/2021. No abnormal enhancement is seen in the brain. Impression: 1. Exam performed as an adjunct to the noncontrast MRI from 12/15/2021.  2. No convincing abnormal enhancement within the brain.         Discharge Time of 35 minutes    Electronically signed by Kell Aguilera PA-C on 12/16/2021 at 4:12 PM

## 2021-12-16 NOTE — PROCEDURES
Electroencephalography (EEG) [address] [facility]        Patient:  John Lipcoby Procedure Date: 12/16/2021   YOB: 1990 Referring Practitioner: Lizzie Smith NP   MRN: [id number] Interpreting Physician: Dr. Vish Hope         HISTORY:    This is a 28 y.o. old female presenting for evaluation of facial droop      REPORT:    With the patient alert, the background showed a well-developed, well-sustained alpha rhythm in the 10-11 Hz range. The background activity attenuated with eye opening. There was symmetric low voltage beta activity seen in the anterior electrodes. No focal slowing or epileptiform discharges were noted. The patient remained awake for the entire duration of the recording. Photic stimulation were performed as an activating maneuver during the recording; no abnormalities were elicited by this maneuver. Hyperventilation was not performed. No abnormalities were seen in the EKG monitoring channel. IMPRESSION:    Normal awake EEG. No focal slowing or epileptiform discharges were seen. Clinical correlation advised.       Electronically signed by: Corrine Vickers DO 12/16/2021 2:45 PM

## 2021-12-16 NOTE — PROGRESS NOTES
Neurology Progress Note  [facility]  Patient Name: Anita Mccall     : 1990      Subjective:   C C: headache with right side weakness  The patient was seen and examined. Chart reviewed in detail. Patient continues to have right facial weakness and right arm heaviness. Her facial weakness appears to have mild involvement of forehead. She admits to watering of right eye. She denies headache or neck pain. Objective:   Scheduled Meds:   pantoprazole  40 mg Oral QAM AC    sodium chloride flush  10 mL IntraVENous 2 times per day    aspirin  81 mg Oral Daily    sodium chloride flush  5-40 mL IntraVENous 2 times per day     Continuous Infusions:   sodium chloride      sodium chloride       PRN Meds:.sodium chloride flush, sodium chloride, polyethylene glycol, acetaminophen **OR** acetaminophen, ondansetron, sodium chloride flush, sodium chloride    Vital Signs:  Vitals:    12/15/21 1051 12/15/21 1429 21 0442 21 0748   BP:  106/65 101/62 (!) 103/58   Pulse:  56 55 53   Resp:  18  14   Temp:  98.3 °F (36.8 °C) 97.5 °F (36.4 °C) 96.8 °F (36 °C)   TempSrc:   Oral Oral   SpO2: 96% 97% 97%    Weight:       Height:              General: A&O x 4, NAD, cooperative  HEENT: NC/AT, EOMI, PERRL, mmm, neck supple  Extremities: no edema, no calf tenderness b/l    Neurological Exam:         Mental Status:  A&O to self, location, and year. NAD, speech clear, language fluent, repetition and naming intact, follows commands appropriately     Cranial Nerves:  CN II-XII intact except decreased sensation to pinprick of right side of face and right facial weakness     Sensation: intact LT/temp UE/LE's.  Loss of sensation to pinprick to right UE     Motor: 5/5 UE/LE's b/l, tone and bulk normal, no pronator drift     Reflexes: 2/4 biceps, triceps, brachioradialis, patellar, and achilles bilaterally; flexor plantar responses bilaterally     Coordination:       Tremors-- None          Gait/Station: Deferred    Labs:   Recent Labs     12/14/21  1859 12/14/21  1900   WBC  --  9.8   NA  --  143   K  --  3.9   CL  --  103   CO2  --  25   BUN  --  10   CREATININE  --  0.7   GLUCOSE 96 90   INR  --  0.98   ,Last TSH [email or web address]  Last Liver Function Results:  @[email or web address](ALT:3,AST:3,BILITOTAL:3,BILIDIR:3,ALKPHOS:3)@     Imaging Studies:   MRI of brain with contrast:  1. Exam performed as an adjunct to the noncontrast MRI from 12/15/2021.   2. No convincing abnormal enhancement within the brain     MRI of brain without contrast:  No acute infarction, intracranial hemorrhage or mass lesion.       5 mm T2 hyperintensity within the right anterior medulla with no   corresponding abnormality on any other sequence. The finding may be sequela   to an old insult or may be artifactual.       If there is concern for abnormalities of the facial nerve contrast enhance   internal auditory canal protocol sequences can be performed.       RECOMMENDATIONS:   Unavailable     Above imaging personally reviewed. Assessment/Plan:    29 y/o female with history of depression, DM, and bariatric surgery presenting with right face/arm weakness and headache. Headache has improved but she continues to have mild right facial weakness (V1-3) and heaviness to right UE. Interestingly her sensation is intact to light touch on face and arm but she has loss of sensation to pinprick to right UE. She took a safety pin and poked herself with with it. 1. Headache with right side weakness possibly due to atypical migraine. Her facial weakness almost looks like a mild form of Bell's palsy. Her MRI of brain negative for acute ischemia. Discussed potential work up for cervical etiology. · MRI of brain as above  · MRI of brain with contrast as above.    · Recommend she continue with ASA therapy  · B12, Vitamin D normal. Zinc and B 1 pending  · If symptoms persist recommend MRI of cervical spine as an outpatient. · EEG to be completed. Patient was discussed with Attending Physician Dr. Marsha Perez.  DWAYNE [facility] AG[facility]CNS  Neurology

## 2021-12-17 ENCOUNTER — TELEPHONE (OUTPATIENT)
Dept: FAMILY MEDICINE CLINIC | Age: 31
End: 2021-12-17

## 2021-12-17 NOTE — TELEPHONE ENCOUNTER
[facility] Transitions Initial Follow Up Call    Call within 2 business days of discharge: yes     Patient: Faith Cole Patient : 1990 MRN: [id number]    [unfilled]    RARS: Readmission Risk Score: 3.4       Spoke with: left message for pt to call office back    Discharge department/facility: [facility]    Non-face-to-face services provided:  Left message for pt to call office    Follow Up  No future appointments.     Damaris Stephens MA

## 2021-12-19 LAB — ZINC: 102.5 UG/DL (ref 60–120)

## 2021-12-22 LAB — VITAMIN B1, PLASMA: 266 NMOL/L (ref 70–180)

## 2022-03-16 ENCOUNTER — OFFICE VISIT (OUTPATIENT)
Dept: BARIATRICS/WEIGHT MGMT | Age: 32
End: 2022-03-16
Payer: COMMERCIAL

## 2022-03-16 VITALS
WEIGHT: 181.7 LBS | BODY MASS INDEX: 30.27 KG/M2 | HEART RATE: 76 BPM | DIASTOLIC BLOOD PRESSURE: 80 MMHG | OXYGEN SATURATION: 100 % | HEIGHT: 65 IN | SYSTOLIC BLOOD PRESSURE: 120 MMHG

## 2022-03-16 DIAGNOSIS — Z98.84 S/P LAPAROSCOPIC SLEEVE GASTRECTOMY: Primary | ICD-10-CM

## 2022-03-16 DIAGNOSIS — E78.5 HYPERLIPIDEMIA, UNSPECIFIED HYPERLIPIDEMIA TYPE: ICD-10-CM

## 2022-03-16 DIAGNOSIS — K21.9 GASTROESOPHAGEAL REFLUX DISEASE WITHOUT ESOPHAGITIS: ICD-10-CM

## 2022-03-16 PROCEDURE — 99213 OFFICE O/P EST LOW 20 MIN: CPT | Performed by: NURSE PRACTITIONER

## 2022-03-16 PROCEDURE — 1036F TOBACCO NON-USER: CPT | Performed by: NURSE PRACTITIONER

## 2022-03-16 PROCEDURE — G8427 DOCREV CUR MEDS BY ELIG CLIN: HCPCS | Performed by: NURSE PRACTITIONER

## 2022-03-16 PROCEDURE — G8484 FLU IMMUNIZE NO ADMIN: HCPCS | Performed by: NURSE PRACTITIONER

## 2022-03-16 PROCEDURE — G8417 CALC BMI ABV UP PARAM F/U: HCPCS | Performed by: NURSE PRACTITIONER

## 2022-03-16 RX ORDER — OMEPRAZOLE 40 MG/1
40 CAPSULE, DELAYED RELEASE ORAL
Qty: 90 CAPSULE | Refills: 1 | Status: SHIPPED | OUTPATIENT
Start: 2022-03-16

## 2022-03-16 ASSESSMENT — ENCOUNTER SYMPTOMS
ALLERGIC/IMMUNOLOGIC NEGATIVE: 1
RESPIRATORY NEGATIVE: 1
EYES NEGATIVE: 1

## 2022-03-16 NOTE — PROGRESS NOTES
BARIATRIC SURGERY OFFICE PROGRESS NOTE    SUBJECTIVE:    Patient presenting today referred from Chepe Krishnamurthy PA-C, for   Chief Complaint   Patient presents with   [facility]     3rd po s/g 21    . Vitals:    22 0939   BP: 120/80   Pulse: 76   SpO2: 100%        BMI: Body mass index is 30.24 kg/m². Weight History: Wt Readings from Last 3 Encounters:   22 181 lb 11.2 oz (82.4 kg)   21 225 lb (102.1 kg)   21 225 lb 12.8 oz (102.4 kg)        If within 30 days of bariatric surgery date, have you been to the ED: Linh Sanchez is a 28 y.o. female presenting in 4 month POST-OP visit. Total weight loss/gain:   -44.1 lbs since last visit  -63.8 lbs since surgery  -71.1 lbs since starting program    Changes in health since last visit: increase in acid reflux    Pt tracking calories: not tracking but mindful eating    Pt exercising: started at gym- 3 times a week    Pt taking vitamins: supplementing    Fluid intake: low, about 2 bottles daily    Past Medical History:   Diagnosis Date    Depression     Diabetes mellitus ([address]     type 2-diet controlled. \"had gestational diabetes and my hbgA1c was 6.1- off medications for the past 2 yrs now just diet controlled    GERD (gastroesophageal reflux disease)     Group B streptococcal infection during pregnancy      (normal spontaneous vaginal delivery) 7-2-15    delivered in ambulance 12 minutes    Obesity, unspecified     Pancreatitis chronic     Problems with hearing     dr Johnathon Boland; only due to when she had a cold        Patient Active Problem List   Diagnosis    Weight gain    Pre-diabetes    Lesion of buccal mucosa    Tobacco use    Obesity    Moderate episode of recurrent major depressive disorder ([address]    Anxiety    Positive depression screening    Generalized body aches    Viral URI    Morbid obesity with BMI of 40.0-44.9, adult ([address]    Complex third degree hemorrhoid    Morbid obesity ([address]  Drooping eyelid    Facial droop    Acute non intractable tension-type headache       Past Surgical History:   Procedure Laterality Date    ENDOSCOPY, COLON, DIAGNOSTIC      HEMORRHOID SURGERY      x3- last time 2019    SLEEVE GASTRECTOMY N/A 11/1/2021    GASTRECTOMY SLEEVE LAPAROSCOPIC ROBOTIC performed by Moni Quijano [facility] at [facility]  2015 August    UPPER GASTROINTESTINAL ENDOSCOPY N/A 7/20/2021    EGD BIOPSY performed by Erasmo Monaco MD at [address] [place] [address],[address]  2015    reconstruction between vag/anal area from childbirth       Current Outpatient Medications   Medication Sig Dispense Refill    omeprazole (PRILOSEC) 40 MG delayed release capsule Take 1 capsule by mouth every morning (before breakfast) 90 capsule 1    aspirin 81 MG chewable tablet Take 1 tablet by mouth daily 30 tablet 3    ondansetron (ZOFRAN) 4 MG tablet Take 4 mg by mouth every 8 hours as needed for Nausea or Vomiting (Patient not taking: Reported on 3/16/2022)      omeprazole (PRILOSEC) 40 MG delayed release capsule Take 1 capsule by mouth every morning (before breakfast) 30 capsule 2     No current facility-administered medications for this visit. Allergies   Allergen Reactions    Wool Alcohol [Lanolin] Hives     \"wool\" ; hives/ blisters         Review of Systems   Constitutional: Negative. HENT: Negative. Eyes: Negative. Respiratory: Negative. Cardiovascular: Negative. Gastrointestinal:        Increased acid reflux and constipation   Endocrine: Negative. Genitourinary: Negative. Skin: Negative. Allergic/Immunologic: Negative. Neurological: Negative. Hematological: Negative. Psychiatric/Behavioral: Negative. OBJECTIVE:    /80   Pulse 76   Ht 5' 5\" (1.651 m)   Wt 181 lb 11.2 oz (82.4 kg)   SpO2 100%   BMI 30.24 kg/m²      Physical Exam  Constitutional:       Appearance: Normal appearance. HENT:      Head: Normocephalic.       Nose: Nose normal.      Mouth/Throat:      Pharynx: Oropharynx is clear. Eyes:      Conjunctiva/sclera: Conjunctivae normal.      Pupils: Pupils are equal, round, and reactive to light. Cardiovascular:      Rate and Rhythm: Normal rate. Pulses: Normal pulses. Pulmonary:      Effort: Pulmonary effort is normal.      Breath sounds: Normal breath sounds. Abdominal:      General: Bowel sounds are normal.      Comments: Incisions well healed   Musculoskeletal:         General: Normal range of motion. Cervical back: Normal range of motion. Skin:     General: Skin is warm and dry. Capillary Refill: Capillary refill takes less than 2 seconds. Neurological:      General: No focal deficit present. Mental Status: She is alert and oriented to person, place, and time. Psychiatric:         Mood and Affect: Mood normal.         Behavior: Behavior normal.         ASSESSMENT & PLAN:    1. S/P laparoscopic sleeve gastrectomy  - doing well with weight loss and managing her diet  - incisions well healed  - constipation 2-3 times a week- taking senna and sugar free gummy bears for relief  - tolerating regular diet    2. Hyperlipidemia, unspecified hyperlipidemia type  - will recheck labs since her weight loss  - hopefully will improve with weight loss  - possible medications in future    3.  Gastroesophageal reflux disease without esophagitis  - increased acid reflux; does eat late and on the go frequently  - encouraged to eat slow, chew well, and avoid late night eating  - symptoms improve when takes Prilosec  - Prilosec refill sent         As of current visit, regarding obesity-related co-morbid conditions:  LITZY [] compliant [] no longer using [] resolved per sleep study; hypertension [] medications; hyperlipidemia [] medications; GERD [] medications; DM [] insulin [] non-insulin [] no meds      The patient expressed understanding and willingness to comply nicely; all questions and concerns addressed. Orders Placed This Encounter   Medications    omeprazole (PRILOSEC) 40 MG delayed release capsule     Sig: Take 1 capsule by mouth every morning (before breakfast)     Dispense:  90 capsule     Refill:  1     Orders Placed This Encounter   Procedures    Vitamin D 25 Hydroxy     Standing Status:   Future     Standing Expiration Date:   3/16/2023    CBC with Auto Differential     Standing Status:   Future     Standing Expiration Date:   3/16/2023    Comprehensive Metabolic Panel     Standing Status:   Future     Standing Expiration Date:   3/16/2023    Iron and TIBC     Standing Status:   Future     Standing Expiration Date:   3/16/2023     Order Specific Question:   Is Patient Fasting? Answer:   yes     Order Specific Question:   No of Hours? Answer:   8    Lipid Panel     Standing Status:   Future     Standing Expiration Date:   3/16/2023     Order Specific Question:   Is Patient Fasting?/# of Hours     Answer:   8       Follow Up:  Return in about 2 months (around 5/16/2022).     CARLEE Siddiqui - CNP

## 2022-05-04 ENCOUNTER — TELEPHONE (OUTPATIENT)
Dept: BARIATRICS/WEIGHT MGMT | Age: 32
End: 2022-05-04

## 2022-05-04 RX ORDER — ESOMEPRAZOLE MAGNESIUM 40 MG/1
40 CAPSULE, DELAYED RELEASE ORAL
Qty: 90 CAPSULE | Refills: 1 | Status: SHIPPED | OUTPATIENT
Start: 2022-05-04

## 2022-05-04 NOTE — TELEPHONE ENCOUNTER
Called and spoke to [facility] [employer] GRAY, Informed her that Nexium was sent into pharmacy.

## 2022-05-05 ENCOUNTER — HOSPITAL ENCOUNTER (OUTPATIENT)
Age: 32
Discharge: HOME | End: 2022-05-05
Payer: COMMERCIAL

## 2022-05-05 DIAGNOSIS — Z98.84 S/P LAPAROSCOPIC SLEEVE GASTRECTOMY: ICD-10-CM

## 2022-05-05 LAB
ALBUMIN SERPL-MCNC: 4.6 GM/DL (ref 3.4–5)
ALP BLD-CCNC: 86 IU/L (ref 40–129)
ALT SERPL-CCNC: 10 U/L (ref 10–40)
ANION GAP SERPL CALCULATED.3IONS-SCNC: 11 MMOL/L (ref 4–16)
AST SERPL-CCNC: 17 IU/L (ref 15–37)
BILIRUB SERPL-MCNC: 0.6 MG/DL (ref 0–1)
BUN BLDV-MCNC: 8 MG/DL (ref 6–23)
CALCIUM SERPL-MCNC: 9.8 MG/DL (ref 8.3–10.6)
CHLORIDE BLD-SCNC: 102 MMOL/L (ref 99–110)
CHOLESTEROL: 229 MG/DL
CO2: 26 MMOL/L (ref 21–32)
CREAT SERPL-MCNC: 0.7 MG/DL (ref 0.6–1.1)
GFR AFRICAN AMERICAN: >60 ML/MIN/1.73M2
GFR NON-AFRICAN AMERICAN: >60 ML/MIN/1.73M2
GLUCOSE BLD-MCNC: 92 MG/DL (ref 70–99)
HDLC SERPL-MCNC: 33 MG/DL
IRON: 93 UG/DL (ref 37–145)
LDL CHOLESTEROL CALCULATED: 163 MG/DL
PCT TRANSFERRIN: 32 % (ref 10–44)
POTASSIUM SERPL-SCNC: 4.3 MMOL/L (ref 3.5–5.1)
SODIUM BLD-SCNC: 139 MMOL/L (ref 135–145)
TOTAL IRON BINDING CAPACITY: 295 UG/DL (ref 250–450)
TOTAL PROTEIN: 6.9 GM/DL (ref 6.4–8.2)
TRIGL SERPL-MCNC: 166 MG/DL
UNSATURATED IRON BINDING CAPACITY: 202 UG/DL (ref 110–370)

## 2022-05-05 PROCEDURE — 83540 ASSAY OF IRON: CPT

## 2022-05-05 PROCEDURE — 36415 COLL VENOUS BLD VENIPUNCTURE: CPT

## 2022-05-05 PROCEDURE — 83550 IRON BINDING TEST: CPT

## 2022-05-05 PROCEDURE — 80053 COMPREHEN METABOLIC PANEL: CPT

## 2022-05-05 PROCEDURE — 82306 VITAMIN D 25 HYDROXY: CPT

## 2022-05-05 PROCEDURE — 80061 LIPID PANEL: CPT

## 2022-05-09 ENCOUNTER — OFFICE VISIT (OUTPATIENT)
Dept: BARIATRICS/WEIGHT MGMT | Age: 32
End: 2022-05-09
Payer: COMMERCIAL

## 2022-05-09 VITALS
HEIGHT: 65 IN | DIASTOLIC BLOOD PRESSURE: 80 MMHG | HEART RATE: 85 BPM | SYSTOLIC BLOOD PRESSURE: 108 MMHG | WEIGHT: 172.7 LBS | BODY MASS INDEX: 28.78 KG/M2

## 2022-05-09 DIAGNOSIS — Z98.84 S/P LAPAROSCOPIC SLEEVE GASTRECTOMY: Primary | ICD-10-CM

## 2022-05-09 DIAGNOSIS — E78.5 HYPERLIPIDEMIA, UNSPECIFIED HYPERLIPIDEMIA TYPE: ICD-10-CM

## 2022-05-09 PROCEDURE — 99213 OFFICE O/P EST LOW 20 MIN: CPT | Performed by: NURSE PRACTITIONER

## 2022-05-09 PROCEDURE — G8427 DOCREV CUR MEDS BY ELIG CLIN: HCPCS | Performed by: NURSE PRACTITIONER

## 2022-05-09 PROCEDURE — 1036F TOBACCO NON-USER: CPT | Performed by: NURSE PRACTITIONER

## 2022-05-09 PROCEDURE — G8417 CALC BMI ABV UP PARAM F/U: HCPCS | Performed by: NURSE PRACTITIONER

## 2022-05-09 ASSESSMENT — ENCOUNTER SYMPTOMS
EYES NEGATIVE: 1
ALLERGIC/IMMUNOLOGIC NEGATIVE: 1
RESPIRATORY NEGATIVE: 1

## 2022-05-09 NOTE — PROGRESS NOTES
BARIATRIC SURGERY OFFICE PROGRESS NOTE    SUBJECTIVE:    Patient presenting today referred from Jakub Miranda PA-C, for   Chief Complaint   Patient presents with   [facility] Post Op Follow Up     6 month F/U S/P Gastric Sleeve @ [facility] 21, Lab Results   . Vitals:    22 1022   BP: 108/80   Pulse: 85        BMI: Body mass index is 28.74 kg/m². Weight History: Wt Readings from Last 3 Encounters:   22 172 lb 11.2 oz (78.3 kg)   22 181 lb 11.2 oz (82.4 kg)   21 225 lb (102.1 kg)       If within 30 days of bariatric surgery date, have you been to the ED: Racquel Yeboah is a 28 y.o. female presenting in bariatric 6 month POST-OP visit. Total weight loss/gain:   -9.0 lbs since last visit  -72.8 lbs since surgery  -82.8 lbs since starting program    Changes in health since last visit: Denies    Pt tracking calories: watching portion size; unsure of protein     Pt exercising: goes to gym several times a week    Pt taking vitamins: supplementing     Fluid intake: 64 oz water daily    Patient encouraged to track calories and protein closer. Mindful eating. She does measure her food on a regular basis. Past Medical History:   Diagnosis Date    Depression     Diabetes mellitus ([address]     type 2-diet controlled. \"had gestational diabetes and my hbgA1c was 6.1- off medications for the past 2 yrs now just diet controlled    GERD (gastroesophageal reflux disease)     Group B streptococcal infection during pregnancy      (normal spontaneous vaginal delivery) 7-2-15    delivered in ambulance 12 minutes    Obesity, unspecified     Pancreatitis chronic     Problems with hearing     dr Lynda Thompson; only due to when she had a cold        Patient Active Problem List   Diagnosis    Weight gain    Pre-diabetes    Lesion of buccal mucosa    Tobacco use    Obesity    Moderate episode of recurrent major depressive disorder ([address]    Anxiety    Positive depression screening    Generalized body aches    Viral URI    Morbid obesity with BMI of 40.0-44.9, adult (HCC)    Complex third degree hemorrhoid    Morbid obesity (HCC)    Drooping eyelid    Facial droop    Acute non intractable tension-type headache       Past Surgical History:   Procedure Laterality Date    ENDOSCOPY, COLON, DIAGNOSTIC      HEMORRHOID SURGERY      x3- last time 2019    SLEEVE GASTRECTOMY N/A 11/1/2021    GASTRECTOMY SLEEVE LAPAROSCOPIC ROBOTIC performed by Beryle Fluke, MD at [facility]  2015 August    UPPER GASTROINTESTINAL ENDOSCOPY N/A 7/20/2021    EGD BIOPSY performed by Alyson Wheeler MD at [facility],[address]  2015    reconstruction between vag/anal area from childbirth       Current Outpatient Medications   Medication Sig Dispense Refill    esomeprazole (NEXIUM) 40 MG delayed release capsule Take 1 capsule by mouth every morning (before breakfast) 90 capsule 1    aspirin 81 MG chewable tablet Take 1 tablet by mouth daily 30 tablet 3    omeprazole (PRILOSEC) 40 MG delayed release capsule Take 1 capsule by mouth every morning (before breakfast) (Patient not taking: Reported on 5/9/2022) 90 capsule 1    ondansetron (ZOFRAN) 4 MG tablet Take 4 mg by mouth every 8 hours as needed for Nausea or Vomiting (Patient not taking: Reported on 3/16/2022)      omeprazole (PRILOSEC) 40 MG delayed release capsule Take 1 capsule by mouth every morning (before breakfast) (Patient not taking: Reported on 5/9/2022) 30 capsule 2     No current facility-administered medications for this visit. Allergies   Allergen Reactions    Wool Alcohol [Lanolin] Hives     \"wool\" ; hives/ blisters         Review of Systems   Constitutional: Negative. HENT: Negative. Eyes: Negative. Respiratory: Negative. Cardiovascular: Negative. Gastrointestinal:        Loose ABD skin   Endocrine: Negative. Genitourinary: Negative. Musculoskeletal: Negative. Skin: Negative. Allergic/Immunologic: Negative. Neurological: Negative. Hematological: Negative. Psychiatric/Behavioral: Negative. OBJECTIVE:    /80 (Site: Right Upper Arm, Position: Sitting, Cuff Size: Large Adult)   Pulse 85   Ht 5' 5\" (1.651 m)   Wt 172 lb 11.2 oz (78.3 kg)   BMI 28.74 kg/m²      Physical Exam  Constitutional:       Appearance: Normal appearance. HENT:      Head: Normocephalic. Nose: Nose normal.      Mouth/Throat:      Pharynx: Oropharynx is clear. Eyes:      Conjunctiva/sclera: Conjunctivae normal.      Pupils: Pupils are equal, round, and reactive to light. Cardiovascular:      Rate and Rhythm: Normal rate. Pulses: Normal pulses. Pulmonary:      Effort: Pulmonary effort is normal.      Breath sounds: Normal breath sounds. Abdominal:      General: Bowel sounds are normal.      Palpations: Abdomen is soft. Comments: incisions well healed   Musculoskeletal:         General: Normal range of motion. Cervical back: Normal range of motion. Skin:     General: Skin is warm and dry. Capillary Refill: Capillary refill takes less than 2 seconds. Neurological:      General: No focal deficit present. Mental Status: She is alert and oriented to person, place, and time. Psychiatric:         Mood and Affect: Mood normal.         Behavior: Behavior normal.         ASSESSMENT & PLAN:    1. S/P laparoscopic sleeve gastrectomy  - Doing well; Down 72.8 pounds since surgery  - Incisions well healed  - Using stool softner on a regulat basis for BM OTC  - Tolerating Regular diet; increase nonstarchy vegetables    - Patient was encouraged to journal all food intake. - Keep calorie level at approximately 600-800, per discussion / plan with registered dietician. - Protein intake is to be a minimum of 50-60 grams per day. - Water drinking was encouraged with a goal of 64oz-128oz daily. Beverages are to be calorie free except for milk. Avoid soda.      - Recent Labs Reviewed; lipid panel remains elevated- will recheck in 3 months  - RTC in 3 months    - CBC with Auto Differential; Future  - Comprehensive Metabolic Panel; Future  - Hemoglobin A1C; Future  - Iron and TIBC; Future  - Lipid Panel; Future  - Magnesium; Future  - TSH with Reflex; Future  - Vitamin B1; Future  - Vitamin B12 & Folate; Future  - Vitamin D 25 Hydroxy; Future  - Zinc; Future    2. Hyperlipidemia, unspecified hyperlipidemia type  - Continue with diet and weight loss  - Will recheck levels in 3 months    As of current visit, regarding obesity-related co-morbid conditions:  LITZY [] compliant [] no longer using [] resolved per sleep study; hypertension [] medications; hyperlipidemia [] medications; GERD [] medications; DM [] insulin [] non-insulin [] no meds      The patient expressed understanding and willingness to comply nicely; all questions and concerns addressed. No orders of the defined types were placed in this encounter. Orders Placed This Encounter   Procedures    CBC with Auto Differential     Standing Status:   Future     Standing Expiration Date:   5/9/2023    Comprehensive Metabolic Panel     Standing Status:   Future     Standing Expiration Date:   5/9/2023    Hemoglobin A1C     Standing Status:   Future     Standing Expiration Date:   5/9/2023    Iron and TIBC     Standing Status:   Future     Standing Expiration Date:   5/9/2023     Order Specific Question:   Is Patient Fasting? Answer:   yes     Order Specific Question:   No of Hours?      Answer:   8    Lipid Panel     Standing Status:   Future     Standing Expiration Date:   5/9/2023     Order Specific Question:   Is Patient Fasting?/# of Hours     Answer:   yes    Magnesium     Standing Status:   Future     Standing Expiration Date:   5/9/2023    TSH with Reflex     Standing Status:   Future     Standing Expiration Date:   5/9/2023    Vitamin B1     Standing Status:   Future     Standing Expiration Date:   5/9/2023  Vitamin B12 & Folate     Standing Status:   Future     Standing Expiration Date:   5/9/2023    Vitamin D 25 Hydroxy     Standing Status:   Future     Standing Expiration Date:   5/9/2023    Zinc     Standing Status:   Future     Standing Expiration Date:   5/9/2023       Follow Up:  Return in about 3 months (around 8/9/2022).     Kobe Liu, APRN - CNP

## 2022-05-10 LAB — VITAMIN D 25-HYDROXY: 23.4 NG/ML

## 2022-05-19 ENCOUNTER — APPOINTMENT (OUTPATIENT)
Dept: CT IMAGING | Age: 32
End: 2022-05-19
Payer: COMMERCIAL

## 2022-05-19 ENCOUNTER — APPOINTMENT (OUTPATIENT)
Dept: GENERAL RADIOLOGY | Age: 32
End: 2022-05-19
Payer: COMMERCIAL

## 2022-05-19 ENCOUNTER — HOSPITAL ENCOUNTER (EMERGENCY)
Age: 32
Discharge: HOME | End: 2022-05-19
Payer: COMMERCIAL

## 2022-05-19 VITALS
RESPIRATION RATE: 18 BRPM | TEMPERATURE: 98.8 F | DIASTOLIC BLOOD PRESSURE: 45 MMHG | WEIGHT: 165 LBS | HEART RATE: 84 BPM | BODY MASS INDEX: 27.49 KG/M2 | OXYGEN SATURATION: 100 % | SYSTOLIC BLOOD PRESSURE: 103 MMHG | HEIGHT: 65 IN

## 2022-05-19 DIAGNOSIS — S30.0XXA CONTUSION OF SACRUM, INITIAL ENCOUNTER: ICD-10-CM

## 2022-05-19 DIAGNOSIS — R55 SYNCOPE AND COLLAPSE: Primary | ICD-10-CM

## 2022-05-19 DIAGNOSIS — M54.50 ACUTE MIDLINE LOW BACK PAIN WITHOUT SCIATICA: ICD-10-CM
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
## 2022-05-19 LAB
ALBUMIN SERPL-MCNC: 4.4 GM/DL (ref 3.4–5)
ALP BLD-CCNC: 82 IU/L (ref 40–129)
ALT SERPL-CCNC: 10 U/L (ref 10–40)
ANION GAP SERPL CALCULATED.3IONS-SCNC: 13 MMOL/L (ref 4–16)
AST SERPL-CCNC: 19 IU/L (ref 15–37)
BASOPHILS ABSOLUTE: 0 K/CU MM
BASOPHILS RELATIVE PERCENT: 0.2 % (ref 0–1)
BILIRUB SERPL-MCNC: 0.3 MG/DL (ref 0–1)
BUN BLDV-MCNC: 11 MG/DL (ref 6–23)
CALCIUM SERPL-MCNC: 10 MG/DL (ref 8.3–10.6)
CHLORIDE BLD-SCNC: 102 MMOL/L (ref 99–110)
CO2: 23 MMOL/L (ref 21–32)
CREAT SERPL-MCNC: 0.6 MG/DL (ref 0.6–1.1)
DIFFERENTIAL TYPE: ABNORMAL
EKG ATRIAL RATE: 66 BPM
EKG DIAGNOSIS: NORMAL
EKG P AXIS: 71 DEGREES
EKG P-R INTERVAL: 130 MS
EKG Q-T INTERVAL: 396 MS
EKG QRS DURATION: 76 MS
EKG QTC CALCULATION (BAZETT): 415 MS
EKG R AXIS: 74 DEGREES
EKG T AXIS: 53 DEGREES
EKG VENTRICULAR RATE: 66 BPM
EOSINOPHILS ABSOLUTE: 0.1 K/CU MM
EOSINOPHILS RELATIVE PERCENT: 0.5 % (ref 0–3)
GFR AFRICAN AMERICAN: >60 ML/MIN/1.73M2
GFR NON-AFRICAN AMERICAN: >60 ML/MIN/1.73M2
GLUCOSE BLD-MCNC: 83 MG/DL (ref 70–99)
HCG QUALITATIVE: NEGATIVE
HCT VFR BLD CALC: 37.9 % (ref 37–47)
HEMOGLOBIN: 12.4 GM/DL (ref 12.5–16)
IMMATURE NEUTROPHIL %: 0.6 % (ref 0–0.43)
LYMPHOCYTES ABSOLUTE: 2.6 K/CU MM
LYMPHOCYTES RELATIVE PERCENT: 19.4 % (ref 24–44)
MCH RBC QN AUTO: 29.4 PG (ref 27–31)
MCHC RBC AUTO-ENTMCNC: 32.7 % (ref 32–36)
MCV RBC AUTO: 89.8 FL (ref 78–100)
MONOCYTES ABSOLUTE: 0.7 K/CU MM
MONOCYTES RELATIVE PERCENT: 5 % (ref 0–4)
NUCLEATED RBC %: 0 %
PDW BLD-RTO: 13.6 % (ref 11.7–14.9)
PLATELET # BLD: 230 K/CU MM (ref 140–440)
PMV BLD AUTO: 10.2 FL (ref 7.5–11.1)
POTASSIUM SERPL-SCNC: 4.1 MMOL/L (ref 3.5–5.1)
PRO-BNP: 66.88 PG/ML
RBC # BLD: 4.22 M/CU MM (ref 4.2–5.4)
SEGMENTED NEUTROPHILS ABSOLUTE COUNT: 9.9 K/CU MM
SEGMENTED NEUTROPHILS RELATIVE PERCENT: 74.3 % (ref 36–66)
SODIUM BLD-SCNC: 138 MMOL/L (ref 135–145)
TOTAL IMMATURE NEUTOROPHIL: 0.08 K/CU MM
TOTAL NUCLEATED RBC: 0 K/CU MM
TOTAL PROTEIN: 7.4 GM/DL (ref 6.4–8.2)
TROPONIN T: <0.01 NG/ML
WBC # BLD: 13.3 K/CU MM (ref 4–10.5)

## 2022-05-19 PROCEDURE — 70450 CT HEAD/BRAIN W/O DYE: CPT

## 2022-05-19 PROCEDURE — 72125 CT NECK SPINE W/O DYE: CPT

## 2022-05-19 PROCEDURE — 93005 ELECTROCARDIOGRAM TRACING: CPT | Performed by: PHYSICIAN ASSISTANT

## 2022-05-19 PROCEDURE — 71045 X-RAY EXAM CHEST 1 VIEW: CPT

## 2022-05-19 PROCEDURE — 84484 ASSAY OF TROPONIN QUANT: CPT

## 2022-05-19 PROCEDURE — 93010 ELECTROCARDIOGRAM REPORT: CPT | Performed by: INTERNAL MEDICINE

## 2022-05-19 PROCEDURE — 6370000000 HC RX 637 (ALT 250 FOR IP): Performed by: PHYSICIAN ASSISTANT

## 2022-05-19 PROCEDURE — 83880 ASSAY OF NATRIURETIC PEPTIDE: CPT

## 2022-05-19 PROCEDURE — 85025 COMPLETE CBC W/AUTO DIFF WBC: CPT

## 2022-05-19 PROCEDURE — 72170 X-RAY EXAM OF PELVIS: CPT

## 2022-05-19 PROCEDURE — 84703 CHORIONIC GONADOTROPIN ASSAY: CPT

## 2022-05-19 PROCEDURE — 72131 CT LUMBAR SPINE W/O DYE: CPT

## 2022-05-19 PROCEDURE — 80053 COMPREHEN METABOLIC PANEL: CPT

## 2022-05-19 PROCEDURE — 99285 EMERGENCY DEPT VISIT HI MDM: CPT

## 2022-05-19 PROCEDURE — 72220 X-RAY EXAM SACRUM TAILBONE: CPT

## 2022-05-19 RX ORDER — ACETAMINOPHEN 325 MG/1
650 TABLET ORAL ONCE
Status: DISCONTINUED | OUTPATIENT
Start: 2022-05-19 | End: 2022-05-19 | Stop reason: HOSPADM

## 2022-05-19 ASSESSMENT — PAIN DESCRIPTION - LOCATION: LOCATION: LEG;BACK;HEAD

## 2022-05-19 ASSESSMENT — PAIN DESCRIPTION - FREQUENCY
FREQUENCY: CONTINUOUS
FREQUENCY: CONTINUOUS

## 2022-05-19 ASSESSMENT — PAIN DESCRIPTION - DESCRIPTORS
DESCRIPTORS: DISCOMFORT
DESCRIPTORS: CRAMPING

## 2022-05-19 ASSESSMENT — PAIN DESCRIPTION - ORIENTATION
ORIENTATION: LEFT;RIGHT;MID
ORIENTATION: RIGHT;LEFT;LOWER

## 2022-05-19 ASSESSMENT — PAIN DESCRIPTION - PAIN TYPE
TYPE: ACUTE PAIN
TYPE: ACUTE PAIN

## 2022-05-19 ASSESSMENT — PAIN SCALES - GENERAL
PAINLEVEL_OUTOF10: 8
PAINLEVEL_OUTOF10: 8

## 2022-05-19 ASSESSMENT — PAIN - FUNCTIONAL ASSESSMENT: PAIN_FUNCTIONAL_ASSESSMENT: PREVENTS OR INTERFERES SOME ACTIVE ACTIVITIES AND ADLS

## 2022-05-19 NOTE — ED PROVIDER NOTES
Patient Identification  Melissa Corrigan is a [address]49 [address] [place] y.o. female    Chief Complaint  Fall (lower back pain, head injury, no blood thinner, no LOC and leg pain, patient states she fell down about 5 stairs this AM)      HPI  (History provided by patient)  This is a [age] [address] [place] y.o. female who was brought in by self for chief complaint of fall. Onset was approximately 830 this morning. Patient reports she was at the top of the steps leading down to the exterior door of the house and suddenly found herself at the bottom of the steps. She reports that she may have fallen down 5 steps. She is unsure exactly why she fell, does not remember falling. She reports afterwards she felt a roaring sensation in her ears, had difficulty speaking, felt lightheaded. This resolved shortly. She felt fine prior to falling. Has no history of syncope. Reports 8 out of 10 pain over the left buttock and sacrum/coccyx and lower back. She does remember hitting her head but denies headache currently. She is not on blood thinners. No extremity pain. Notes history of bariatric surgery in November 2021, has lost over 100 pounds since surgery. REVIEW OF SYSTEMS    Constitutional:  Denies fever, chills  HENT:  Denies sore throat or ear pain   Eyes: Denies vision changes, eye pain  Cardiovascular:  Denies chest pain. ? syncope  Respiratory:  Denies shortness of breath, cough   GI:  Denies abdominal pain, nausea, vomiting  :  Denies dysuria, discharge  Musculoskeletal:  Denies joint pain.  + back pain  Skin:  Denies rash, pruritis  Neurologic:  Denies headache, focal weakness, or sensory changes     See HPI and nursing notes for additional information     I have reviewed the following nursing documentation:  Allergies:    Allergies   Allergen Reactions    Wool Alcohol [Lanolin] Hives     \"wool\" ; hives/ blisters       Past medical history:  has a past medical history of Depression, Diabetes mellitus ([address] GERD (gastroesophageal reflux disease), Group B streptococcal infection during pregnancy,  (normal spontaneous vaginal delivery) (7-2-15), Obesity, unspecified, Pancreatitis chronic, and Problems with hearing. Past surgical history:  has a past surgical history that includes Tubal ligation (2015); Hemorrhoid surgery; Vagina surgery (); Upper gastrointestinal endoscopy (N/A, 2021); Endoscopy, colon, diagnostic; and Sleeve Gastrectomy (N/A, 2021). Home medications:   Prior to Admission medications    Medication Sig Start Date End Date Taking? Authorizing Provider   esomeprazole (NEXIUM) 40 MG delayed release capsule Take 1 capsule by mouth every morning (before breakfast) 22   CARLEE Glynn CNP   omeprazole (PRILOSEC) 40 MG delayed release capsule Take 1 capsule by mouth every morning (before breakfast)  Patient not taking: Reported on 2022 3/16/22   CARLEE Glynn CNP   aspirin 81 MG chewable tablet Take 1 tablet by mouth daily 21   Jefe Vargas PA-C   ondansetron (ZOFRAN) 4 MG tablet Take 4 mg by mouth every 8 hours as needed for Nausea or Vomiting  Patient not taking: Reported on 3/16/2022    Historical Provider, MD   omeprazole (PRILOSEC) 40 MG delayed release capsule Take 1 capsule by mouth every morning (before breakfast)  Patient not taking: Reported on 2022   Maylin Blackwood MD       Social history:  reports that she quit smoking about 14 months ago. Her smoking use included cigarettes. She has a 4.00 pack-year smoking history. She has never used smokeless tobacco. She reports previous alcohol use. She reports that she does not use drugs.     Family history:    Family History   Adopted: Yes   Problem Relation Age of Onset    Breast Cancer Mother     Ovarian Cancer Mother     No Known Problems Father     Other Sister         autistic (1 sister)    Cancer Brother         eye, testicular         Exam  BP (!) 103/45   Pulse 84   Temp 98.8 °F (37.1 °C) (Oral) Resp 18   Ht 5' 5\" (1.651 m)   Wt 165 lb (74.8 kg)   LMP 05/02/2022   SpO2 100%   BMI 27.46 kg/m²   Nursing note and vitals reviewed. Constitutional: Well developed, well nourished. No acute distress. HENT:      Head: Normocephalic and atraumatic. Ears: External ears normal.      Nose: Nose normal.     Mouth: Membrane mucosa moist and pink. No posterior oropharynx erythema or tonsillar edema  Eyes: Anicteric sclera. No discharge, PERRL  Neck: Supple. Trachea midline. Cardiovascular: RRR, no murmurs, rubs, or gallops, radial pulses 2+ bilaterally. Pulmonary/Chest: Effort normal. No respiratory distress. CTAB. No stridor. No wheezes. No rales. Abdominal: Soft. Nontender to palpation. No distension. No guarding, rebound tenderness, or evidence of ascites. : No CVA tenderness. Musculoskeletal: Moves all extremities. No gross deformity. No tenderness palpation of the cervical or thoracic spine. There is mild tenderness palpation lower lumbar spine and the sacrum and coccyx and the left buttock. Pelvis stable and nontender. Remainder bilateral upper and lower extremities nontender. NEUROLOGICAL: Awake and alert. GCS 15. Cranial nerves 2-12 grossly intact. Strength 5/5 throughout. Light touch sensation intact throughout. Finger to nose WNL. Skin: Warm and dry. No rash. Psychiatric: Normal mood and affect. Behavior is normal.       EKG   Please see Dr. Mreari Galvan note for EKG read. Radiographs (if obtained):  [] The following radiograph was interpreted by myself in the absence of a radiologist:   [x] Radiologist's Report Reviewed:  CT LUMBAR SPINE WO CONTRAST   Final Result   No acute abnormality in the lumbar spine. CT CERVICAL SPINE WO CONTRAST   Final Result   No acute abnormality of the cervical spine. CT HEAD WO CONTRAST   Final Result   No acute intracranial abnormality.          XR PELVIS (1-2 VIEWS)   Final Result   No acute radiographic abnormality of visualized lower lumbar spine, bony   pelvis, visualized lower extremities or soft tissues as visualized. XR CHEST PORTABLE   Final Result   Radiographically nonacute portable chest.      No detectable osseous injury on portable chest radiograph. Dedicated study   of any clinically suspicious area osseous injury suggested. XR SACRUM COCCYX (MIN 2 VIEWS)   Final Result   No acute osseous abnormality.                 Labs  Results for orders placed or performed during the hospital encounter of 05/19/22   CBC with Auto Differential   Result Value Ref Range    WBC 13.3 (H) 4.0 - 10.5 K/CU MM    RBC 4.22 4.2 - 5.4 M/CU MM    Hemoglobin 12.4 (L) 12.5 - 16.0 GM/DL    Hematocrit 37.9 37 - 47 %    MCV 89.8 78 - 100 FL    MCH 29.4 27 - 31 PG    MCHC 32.7 32.0 - 36.0 %    RDW 13.6 11.7 - 14.9 %    Platelets 975 229 - 366 K/CU MM    MPV 10.2 7.5 - 11.1 FL    Differential Type AUTOMATED DIFFERENTIAL     Segs Relative 74.3 (H) 36 - 66 %    Lymphocytes % 19.4 (L) 24 - 44 %    Monocytes % 5.0 (H) 0 - 4 %    Eosinophils % 0.5 0 - 3 %    Basophils % 0.2 0 - 1 %    Segs Absolute 9.9 K/CU MM    Lymphocytes Absolute 2.6 K/CU MM    Monocytes Absolute 0.7 K/CU MM    Eosinophils Absolute 0.1 K/CU MM    Basophils Absolute 0.0 K/CU MM    Nucleated RBC % 0.0 %    Total Nucleated RBC 0.0 K/CU MM    Total Immature Neutrophil 0.08 K/CU MM    Immature Neutrophil % 0.6 (H) 0 - 0.43 %   Comprehensive Metabolic Panel w/ Reflex to MG   Result Value Ref Range    Sodium 138 135 - 145 MMOL/L    Potassium 4.1 3.5 - 5.1 MMOL/L    Chloride 102 99 - 110 mMol/L    CO2 23 21 - 32 MMOL/L    BUN 11 6 - 23 MG/DL    CREATININE 0.6 0.6 - 1.1 MG/DL    Glucose 83 70 - 99 MG/DL    Calcium 10.0 8.3 - 10.6 MG/DL    Albumin 4.4 3.4 - 5.0 GM/DL    Total Protein 7.4 6.4 - 8.2 GM/DL    Total Bilirubin 0.3 0.0 - 1.0 MG/DL    ALT 10 10 - 40 U/L    AST 19 15 - 37 IU/L    Alkaline Phosphatase 82 40 - 129 IU/L    GFR Non-African American >60 >60 mL/min/1.73m2 GFR African American >60 >60 mL/min/1.73m2    Anion Gap 13 4 - 16   Troponin   Result Value Ref Range    Troponin T <0.010 <0.01 NG/ML   Brain Natriuretic Peptide   Result Value Ref Range    Pro-BNP 66.88 <300 PG/ML   HCG Qualitative, Serum   Result Value Ref Range    hCG Qual NEGATIVE    EKG 12 Lead   Result Value Ref Range    Ventricular Rate 66 BPM    Atrial Rate 66 BPM    P-R Interval 130 ms    QRS Duration 76 ms    Q-T Interval 396 ms    QTc Calculation (Bazett) 415 ms    P Axis 71 degrees    R Axis 74 degrees    T Axis 53 degrees    Diagnosis       Normal sinus rhythm with sinus arrhythmia  Normal ECG  No previous ECGs available           MDM  Patient presents for falling on the stairs. She does not remember falling, unsure if she had syncopal episode. Work-up of all areas of pain reveals no acute findings. Patient requested Tylenol in ED which was ordered, I see nurses note saying that patient is now refusing Tylenol. Patient did not discuss this with me. Her laboratory testing and EKG and chest x-ray are unremarkable. Recommended watchful waiting. Plan is to discharge. Return to ED for any new or worsening symptoms. Follow-up with PCP in 1 week. Assessment and plan discussed with patient understands and agrees    I have independently evaluated this patient. Final Impression  1. Syncope and collapse    2. Fall, initial encounter    3. Contusion of sacrum, initial encounter    4. Acute midline low back pain without sciatica        Blood pressure (!) 103/45, pulse 84, temperature 98.8 °F (37.1 °C), temperature source Oral, resp. rate 18, height 5' 5\" (1.651 m), weight 165 lb (74.8 kg), last menstrual period 05/02/2022, SpO2 100 %, not currently breastfeeding. Disposition:  Discharge to home in stable condition. Patient was given scripts for the following medications. I counseled patient how to take these medications.      Discharge Medication List as of 5/19/2022  3:22 PM          This chart was generated using the [address] [place] [address] dictation system. I created this record but it may contain dictation errors given the limitations of this technology.        Clayton Jaeger PA-C  05/19/22 [id number]

## 2022-05-19 NOTE — ED NOTES
Patient refused tylenol at this time states that the tylenol won't do anything.      Qamar Ibrahim RN  16/94/58 4800

## 2022-05-19 NOTE — ED PROVIDER NOTES
KG shows sinus rhythm at 66. Normal axis with good R wave progression. No ST elevation or depression. No ectopy. Appears similar to prior tracing.      [address] [facility],   05/19/22 [id number]

## 2022-05-19 NOTE — ED NOTES
Patient discharged to home at this time. Discharge instructions and follow up care discussed, patient voices understanding.       Neal Pretty RN  05/19/22 [id number]

## 2022-08-03 ENCOUNTER — TELEPHONE (OUTPATIENT)
Dept: BARIATRICS/WEIGHT MGMT | Age: 32
End: 2022-08-03

## 2022-08-05 NOTE — TELEPHONE ENCOUNTER
LMVM to remind the patient of the appointment and to make sure to get the lab work done before the appointment on Monday, call back if she has any questions.

## 2023-01-10 ENCOUNTER — OFFICE VISIT (OUTPATIENT)
Dept: BARIATRICS/WEIGHT MGMT | Age: 33
End: 2023-01-10
Payer: COMMERCIAL

## 2023-01-10 VITALS
HEIGHT: 65 IN | WEIGHT: 151.3 LBS | BODY MASS INDEX: 25.21 KG/M2 | DIASTOLIC BLOOD PRESSURE: 72 MMHG | SYSTOLIC BLOOD PRESSURE: 118 MMHG | HEART RATE: 68 BPM

## 2023-01-10 DIAGNOSIS — Z98.84 HISTORY OF BARIATRIC SURGERY: ICD-10-CM

## 2023-01-10 DIAGNOSIS — R10.11 RUQ PAIN: Primary | ICD-10-CM

## 2023-01-10 PROCEDURE — 99214 OFFICE O/P EST MOD 30 MIN: CPT | Performed by: SURGERY

## 2023-01-10 PROCEDURE — G8417 CALC BMI ABV UP PARAM F/U: HCPCS | Performed by: SURGERY

## 2023-01-10 PROCEDURE — G8427 DOCREV CUR MEDS BY ELIG CLIN: HCPCS | Performed by: SURGERY

## 2023-01-10 PROCEDURE — 1036F TOBACCO NON-USER: CPT | Performed by: SURGERY

## 2023-01-10 PROCEDURE — G8484 FLU IMMUNIZE NO ADMIN: HCPCS | Performed by: SURGERY

## 2023-01-10 ASSESSMENT — PATIENT HEALTH QUESTIONNAIRE - PHQ9
SUM OF ALL RESPONSES TO PHQ QUESTIONS 1-9: 0
2. FEELING DOWN, DEPRESSED OR HOPELESS: 0
1. LITTLE INTEREST OR PLEASURE IN DOING THINGS: 0
SUM OF ALL RESPONSES TO PHQ QUESTIONS 1-9: 0
SUM OF ALL RESPONSES TO PHQ9 QUESTIONS 1 & 2: 0

## 2023-01-10 ASSESSMENT — ENCOUNTER SYMPTOMS
ABDOMINAL PAIN: 1
BACK PAIN: 1

## 2023-01-10 NOTE — PROGRESS NOTES
BARIATRIC SURGERY OFFICE PROGRESS NOTE    SUBJECTIVE:    Patient presenting today referred from Jocelyn Tillman PA-C, for   Chief Complaint   Patient presents with    Follow-up     FU C/O RUQ Pain SP S/G 21   . Vitals:    01/10/23 0907   BP: 118/72   Pulse: 68        BMI: Body mass index is 25.18 kg/m². Weight History: Wt Readings from Last 3 Encounters:   01/10/23 151 lb 4.8 oz (68.6 kg)   22 165 lb (74.8 kg)   22 172 lb 11.2 oz (78.3 kg)       If within 30 days of bariatric surgery date, have you been to the ED: Tarsha Rubin is a 33 y.o. female presenting in  ~1y2mo  bariatric POST-OP visit. Weight change:     -21.4 lbs since last visit.    -94.2 lbs since surgery.    -104.2 lbs since starting program.    Changes in health since last visit: RUQ pain, reflux    Pre-op clearances completed: N/A    Pt tracking calories/protein: yes. Pt exercising: Yes    Pt taking vitamins: Yes    Fluid intake: Appropriate    Past Medical History:   Diagnosis Date    Depression     Diabetes mellitus ([address]     type 2-diet controlled. \"had gestational diabetes and my hbgA1c was 6.1- off medications for the past 2 yrs now just diet controlled    GERD (gastroesophageal reflux disease)     Group B streptococcal infection during pregnancy      (normal spontaneous vaginal delivery) 7-2-15    delivered in ambulance 12 minutes    Obesity, unspecified     Pancreatitis chronic     Problems with hearing     dr Juan Manuel Matias; only due to when she had a cold        Patient Active Problem List   Diagnosis    Weight gain    Pre-diabetes    Lesion of buccal mucosa    Tobacco use    Obesity    Moderate episode of recurrent major depressive disorder (HCC)    Anxiety    Positive depression screening    Generalized body aches    Viral URI    Morbid obesity with BMI of 40.0-44.9, adult (HCC)    Complex third degree hemorrhoid    Morbid obesity ([address]    Drooping eyelid    Facial droop    Acute non intractable tension-type headache       Past Surgical History:   Procedure Laterality Date    ENDOSCOPY, COLON, DIAGNOSTIC      HEMORRHOID SURGERY      x3- last time 2019    SLEEVE GASTRECTOMY N/A 11/1/2021    GASTRECTOMY SLEEVE LAPAROSCOPIC ROBOTIC performed by Therese Carrel, MD at [address] [facility]  2015 August    UPPER GASTROINTESTINAL ENDOSCOPY N/A 7/20/2021    EGD BIOPSY performed by Elva Alvarado MD at [address] [facility]  2015    reconstruction between vag/anal area from childbirth       Current Outpatient Medications   Medication Sig Dispense Refill    omeprazole (PRILOSEC) 40 MG delayed release capsule Take 1 capsule by mouth every morning (before breakfast) 30 capsule 2    esomeprazole (NEXIUM) 40 MG delayed release capsule Take 1 capsule by mouth every morning (before breakfast) (Patient not taking: Reported on 1/10/2023) 90 capsule 1    omeprazole (PRILOSEC) 40 MG delayed release capsule Take 1 capsule by mouth every morning (before breakfast) (Patient not taking: Reported on 5/9/2022) 90 capsule 1    aspirin 81 MG chewable tablet Take 1 tablet by mouth daily (Patient not taking: Reported on 1/10/2023) 30 tablet 3    ondansetron (ZOFRAN) 4 MG tablet Take 4 mg by mouth every 8 hours as needed for Nausea or Vomiting (Patient not taking: Reported on 3/16/2022)       No current facility-administered medications for this visit. Allergies   Allergen Reactions    Wool Alcohol [Lanolin] Hives     \"wool\" ; hives/ blisters         Review of Systems   Gastrointestinal:  Positive for abdominal pain (epigastric, RUQ). Musculoskeletal:  Positive for back pain. All other systems reviewed and are negative. OBJECTIVE:    /72 (Site: Right Upper Arm, Position: Sitting, Cuff Size: Medium Adult)   Pulse 68   Ht 5' 5\" (1.651 m)   Wt 151 lb 4.8 oz (68.6 kg)   BMI 25.18 kg/m²      Physical Exam  Vitals reviewed. HENT:      Head: Atraumatic.       Right Ear: External ear normal. Left Ear: External ear normal.   Eyes:      General:         Right eye: No discharge. Left eye: No discharge. Extraocular Movements: Extraocular movements intact. Cardiovascular:      Rate and Rhythm: Normal rate. Pulmonary:      Effort: No respiratory distress. Abdominal:      General: There is no distension. Tenderness: There is no abdominal tenderness. There is no guarding. Musculoskeletal:         General: No swelling. Skin:     Coloration: Skin is not jaundiced. Neurological:      Mental Status: She is alert. Cranial Nerves: No cranial nerve deficit. ASSESSMENT & PLAN:    1. RUQ pain    - Lipase; Future  - US GALLBLADDER RUQ; Future  - NM HEPATOBILIARY SCAN W EJECTION FRACTION; Future  - Comprehensive Metabolic Panel; Future    2. History of bariatric surgery    Needs annual labs:    - CBC with Auto Differential; Future  - Hemoglobin A1C; Future  - Iron and TIBC; Future  - Lipid Panel; Future  - Magnesium; Future  - TSH with Reflex; Future  - Vitamin A; Future  - Vitamin B1; Future  - Vitamin B12 & Folate; Future  - Vitamin D 25 Hydroxy; Future  - Zinc; Future      -Daily MVI. -Pt encouraged to continue exercising. Minimum goal of 150 minutes per week with ideal goal of 300 min per week. Exercise regimen should include at least 2-3 sessions per week of strength training. These recommendations are current with the most recent ASMBS guidelines.  -60 g protein / d.   -BMI 25.18 from 42.05. Doing well. Down 104.2 lbs since surgery.      3. Possible reflux  -Avoid trigger foods  -PPI BID  -If above workup unremarkable will perform EGD.   -No NSAIDS  -Smoking cessation       As of current visit, regarding obesity-related co-morbid conditions:  LITZY [] compliant [] no longer using [] resolved per sleep study; hypertension [] medications; hyperlipidemia [] medications; GERD [] medications; DM [] insulin [] non-insulin [] no meds     No orders of the defined types were placed in this encounter. Orders Placed This Encounter   Procedures    US GALLBLADDER RUQ     This procedure can be scheduled via [facility]. Access your [facility] account by visiting [email or web address]. Standing Status:   Future     Standing Expiration Date:   1/10/2024    NM HEPATOBILIARY SCAN W EJECTION FRACTION     Standing Status:   Future     Standing Expiration Date:   1/10/2024    Lipase     Standing Status:   Future     Standing Expiration Date:   1/10/2024    CBC with Auto Differential     Standing Status:   Future     Standing Expiration Date:   1/10/2024    Comprehensive Metabolic Panel     Standing Status:   Future     Standing Expiration Date:   1/10/2024    Hemoglobin A1C     Standing Status:   Future     Standing Expiration Date:   1/10/2024    Iron and TIBC     Standing Status:   Future     Standing Expiration Date:   1/10/2024     Order Specific Question:   Is Patient Fasting? Answer:   yes     Order Specific Question:   No of Hours? Answer:   ~ 8 hr    Lipid Panel     Standing Status:   Future     Standing Expiration Date:   1/10/2024     Order Specific Question:   Is Patient Fasting?/# of Hours     Answer:   yes ~ 8 hr    Magnesium     Standing Status:   Future     Standing Expiration Date:   1/10/2024    TSH with Reflex     Standing Status:   Future     Standing Expiration Date:   1/10/2024    Vitamin A     Standing Status:   Future     Standing Expiration Date:   1/10/2024    Vitamin B1     Standing Status:   Future     Standing Expiration Date:   1/10/2024    Vitamin B12 & Folate     Standing Status:   Future     Standing Expiration Date:   1/10/2024    Vitamin D 25 Hydroxy     Standing Status:   Future     Standing Expiration Date:   1/10/2024    Zinc     Standing Status:   Future     Standing Expiration Date:   1/10/2024       Follow Up:  No follow-ups on file.     Nicky Newell MD

## 2023-01-24 ENCOUNTER — HOSPITAL ENCOUNTER (OUTPATIENT)
Dept: ULTRASOUND IMAGING | Age: 33
Discharge: HOME | End: 2023-01-24
Payer: COMMERCIAL

## 2023-01-24 ENCOUNTER — HOSPITAL ENCOUNTER (OUTPATIENT)
Dept: NUCLEAR MEDICINE | Age: 33
Discharge: HOME | End: 2023-01-24
Payer: COMMERCIAL

## 2023-01-24 ENCOUNTER — HOSPITAL ENCOUNTER (OUTPATIENT)
Age: 33
Discharge: HOME | End: 2023-01-24
Payer: COMMERCIAL

## 2023-01-24 VITALS — BODY MASS INDEX: 24.99 KG/M2 | HEIGHT: 65 IN | WEIGHT: 150 LBS

## 2023-01-24 DIAGNOSIS — R10.11 RUQ PAIN: ICD-10-CM

## 2023-01-24 LAB
ALBUMIN SERPL-MCNC: 4.4 GM/DL (ref 3.4–5)
ALP BLD-CCNC: 64 IU/L (ref 40–129)
ALT SERPL-CCNC: 9 U/L (ref 10–40)
ANION GAP SERPL CALCULATED.3IONS-SCNC: 7 MMOL/L (ref 4–16)
AST SERPL-CCNC: 13 IU/L (ref 15–37)
BASOPHILS ABSOLUTE: 0 K/CU MM
BASOPHILS RELATIVE PERCENT: 0.4 % (ref 0–1)
BILIRUB SERPL-MCNC: 0.5 MG/DL (ref 0–1)
BUN BLDV-MCNC: 14 MG/DL (ref 6–23)
CALCIUM SERPL-MCNC: 9.3 MG/DL (ref 8.3–10.6)
CHLORIDE BLD-SCNC: 105 MMOL/L (ref 99–110)
CHOLESTEROL: 155 MG/DL
CO2: 29 MMOL/L (ref 21–32)
CREAT SERPL-MCNC: 0.7 MG/DL (ref 0.6–1.1)
DIFFERENTIAL TYPE: ABNORMAL
EOSINOPHILS ABSOLUTE: 0.2 K/CU MM
EOSINOPHILS RELATIVE PERCENT: 1.7 % (ref 0–3)
ESTIMATED AVERAGE GLUCOSE: 108 MG/DL
FOLATE: 7 NG/ML (ref 3.1–17.5)
GFR SERPL CREATININE-BSD FRML MDRD: >60 ML/MIN/1.73M2
GLUCOSE BLD-MCNC: 83 MG/DL (ref 70–99)
HBA1C MFR BLD: 5.4 % (ref 4.2–6.3)
HCT VFR BLD CALC: 38.1 % (ref 37–47)
HDLC SERPL-MCNC: 43 MG/DL
HEMOGLOBIN: 12.1 GM/DL (ref 12.5–16)
IMMATURE NEUTROPHIL %: 0.4 % (ref 0–0.43)
IRON: 101 UG/DL (ref 37–145)
LDL CHOLESTEROL CALCULATED: 89 MG/DL
LIPASE: 16 IU/L (ref 13–60)
LYMPHOCYTES ABSOLUTE: 2.7 K/CU MM
LYMPHOCYTES RELATIVE PERCENT: 30.1 % (ref 24–44)
MAGNESIUM: 2 MG/DL (ref 1.8–2.4)
MCH RBC QN AUTO: 29.4 PG (ref 27–31)
MCHC RBC AUTO-ENTMCNC: 31.8 % (ref 32–36)
MCV RBC AUTO: 92.5 FL (ref 78–100)
MONOCYTES ABSOLUTE: 0.5 K/CU MM
MONOCYTES RELATIVE PERCENT: 5.3 % (ref 0–4)
NUCLEATED RBC %: 0 %
PCT TRANSFERRIN: 33 % (ref 10–44)
PDW BLD-RTO: 13.5 % (ref 11.7–14.9)
PLATELET # BLD: 215 K/CU MM (ref 140–440)
PMV BLD AUTO: 9.9 FL (ref 7.5–11.1)
POTASSIUM SERPL-SCNC: 3.6 MMOL/L (ref 3.5–5.1)
RBC # BLD: 4.12 M/CU MM (ref 4.2–5.4)
SEGMENTED NEUTROPHILS ABSOLUTE COUNT: 5.6 K/CU MM
SEGMENTED NEUTROPHILS RELATIVE PERCENT: 62.1 % (ref 36–66)
SODIUM BLD-SCNC: 141 MMOL/L (ref 135–145)
TOTAL IMMATURE NEUTOROPHIL: 0.04 K/CU MM
TOTAL IRON BINDING CAPACITY: 308 UG/DL (ref 250–450)
TOTAL NUCLEATED RBC: 0 K/CU MM
TOTAL PROTEIN: 6.5 GM/DL (ref 6.4–8.2)
TRIGL SERPL-MCNC: 113 MG/DL
TSH HIGH SENSITIVITY: 1.47 UIU/ML (ref 0.27–4.2)
UNSATURATED IRON BINDING CAPACITY: 207 UG/DL (ref 110–370)
VITAMIN B-12: 424.5 PG/ML (ref 211–911)
WBC # BLD: 9 K/CU MM (ref 4–10.5)

## 2023-01-24 PROCEDURE — 36415 COLL VENOUS BLD VENIPUNCTURE: CPT

## 2023-01-24 PROCEDURE — 84425 ASSAY OF VITAMIN B-1: CPT

## 2023-01-24 PROCEDURE — 83540 ASSAY OF IRON: CPT

## 2023-01-24 PROCEDURE — 82746 ASSAY OF FOLIC ACID SERUM: CPT

## 2023-01-24 PROCEDURE — 6360000002 HC RX W HCPCS: Performed by: SURGERY

## 2023-01-24 PROCEDURE — 82607 VITAMIN B-12: CPT

## 2023-01-24 PROCEDURE — A9537 TC99M MEBROFENIN: HCPCS | Performed by: SURGERY

## 2023-01-24 PROCEDURE — 76705 ECHO EXAM OF ABDOMEN: CPT

## 2023-01-24 PROCEDURE — 84590 ASSAY OF VITAMIN A: CPT

## 2023-01-24 PROCEDURE — 78227 HEPATOBIL SYST IMAGE W/DRUG: CPT

## 2023-01-24 PROCEDURE — 80053 COMPREHEN METABOLIC PANEL: CPT

## 2023-01-24 PROCEDURE — 84443 ASSAY THYROID STIM HORMONE: CPT

## 2023-01-24 PROCEDURE — 83735 ASSAY OF MAGNESIUM: CPT

## 2023-01-24 PROCEDURE — 82306 VITAMIN D 25 HYDROXY: CPT

## 2023-01-24 PROCEDURE — 2580000003 HC RX 258: Performed by: SURGERY

## 2023-01-24 PROCEDURE — 83036 HEMOGLOBIN GLYCOSYLATED A1C: CPT

## 2023-01-24 PROCEDURE — 3430000000 HC RX DIAGNOSTIC RADIOPHARMACEUTICAL: Performed by: SURGERY

## 2023-01-24 PROCEDURE — 80061 LIPID PANEL: CPT

## 2023-01-24 PROCEDURE — 84630 ASSAY OF ZINC: CPT

## 2023-01-24 PROCEDURE — 83550 IRON BINDING TEST: CPT

## 2023-01-24 PROCEDURE — 85025 COMPLETE CBC W/AUTO DIFF WBC: CPT

## 2023-01-24 PROCEDURE — 83690 ASSAY OF LIPASE: CPT

## 2023-01-24 RX ADMIN — SODIUM CHLORIDE 1.36 MCG: 9 INJECTION, SOLUTION INTRAVENOUS at 11:45

## 2023-01-24 RX ADMIN — Medication 4.4 MILLICURIE: at 09:16

## 2023-01-26 LAB — ZINC: 76.3 UG/DL (ref 60–120)

## 2023-01-27 LAB
RETINYL PALMITATE: 0.03 MG/L (ref 0–0.1)
VITAMIN A LEVEL: 0.47 MG/L (ref 0.3–1.2)
VITAMIN A, INTERP: NORMAL

## 2023-01-29 LAB — VITAMIN B1, PLASMA: 96 NMOL/L (ref 70–180)

## 2023-02-09 ENCOUNTER — OFFICE VISIT (OUTPATIENT)
Dept: BARIATRICS/WEIGHT MGMT | Age: 33
End: 2023-02-09
Payer: COMMERCIAL

## 2023-02-09 VITALS
SYSTOLIC BLOOD PRESSURE: 118 MMHG | BODY MASS INDEX: 24.06 KG/M2 | WEIGHT: 144.4 LBS | DIASTOLIC BLOOD PRESSURE: 58 MMHG | HEIGHT: 65 IN

## 2023-02-09 DIAGNOSIS — R19.7 DIARRHEA, UNSPECIFIED TYPE: Primary | ICD-10-CM

## 2023-02-09 DIAGNOSIS — R10.13 EPIGASTRIC PAIN: ICD-10-CM

## 2023-02-09 DIAGNOSIS — Z90.3 HISTORY OF SLEEVE GASTRECTOMY: ICD-10-CM

## 2023-02-09 PROCEDURE — G8420 CALC BMI NORM PARAMETERS: HCPCS | Performed by: SURGERY

## 2023-02-09 PROCEDURE — 1036F TOBACCO NON-USER: CPT | Performed by: SURGERY

## 2023-02-09 PROCEDURE — G8484 FLU IMMUNIZE NO ADMIN: HCPCS | Performed by: SURGERY

## 2023-02-09 PROCEDURE — 99213 OFFICE O/P EST LOW 20 MIN: CPT | Performed by: SURGERY

## 2023-02-09 PROCEDURE — G8427 DOCREV CUR MEDS BY ELIG CLIN: HCPCS | Performed by: SURGERY

## 2023-02-09 ASSESSMENT — ENCOUNTER SYMPTOMS
ABDOMINAL PAIN: 1
DIARRHEA: 1

## 2023-02-09 NOTE — PROGRESS NOTES
Chief Complaint   Patient presents with    Follow-up     US GALLBLADDER HIDA SCAN 23 LABS COMPLETED          SUBJECTIVE:  HPI: Patient is here with complaints of:    F/u HIDA and U/S. Still having diarrhea after protein shakes. Tolerating cream soups. Smoking socially. Denies NSAID usage. I have reviewed the patient's(pertinent information to this visit) medical history, family history(scanned in  the [address] [place] [address] under \"patient questioner\"), social history and review of systems with the patient today in the office. Past Surgical History:   Procedure Laterality Date    ENDOSCOPY, COLON, DIAGNOSTIC      HEMORRHOID SURGERY      x3- last time 2019    SLEEVE GASTRECTOMY N/A 2021    GASTRECTOMY SLEEVE LAPAROSCOPIC ROBOTIC performed by Billy Delgado MD at [address] [facility]  2015    UPPER GASTROINTESTINAL ENDOSCOPY N/A 2021    EGD BIOPSY performed by Jorje Sung MD at [address]      reconstruction between vag/anal area from childbirth     Past Medical History:   Diagnosis Date    Depression     Diabetes mellitus ([address]     type 2-diet controlled. \"had gestational diabetes and my hbgA1c was 6.1- off medications for the past 2 yrs now just diet controlled    GERD (gastroesophageal reflux disease)     Group B streptococcal infection during pregnancy      (normal spontaneous vaginal delivery) 7-2-15    delivered in ambulance 12 minutes    Obesity, unspecified     Pancreatitis chronic     Problems with hearing     dr Lisbet Jacome; only due to when she had a cold     Family History   Adopted: Yes   Problem Relation Age of Onset    Breast Cancer Mother     Ovarian Cancer Mother     No Known Problems Father     Other Sister         autistic (1 sister)    Cancer Brother         eye, testicular     Social History     Socioeconomic History    Marital status:      Spouse name: Not on file    Number of children: Not on file    Years of education: Not on file    Highest education level: Not on file   Occupational History    Not on file   Tobacco Use    Smoking status: Former     Packs/day: 0.50     Years: 8.00     Pack years: 4.00     Types: Cigarettes     Quit date: 2021     Years since quittin.9    Smokeless tobacco: Never   Vaping Use    Vaping Use: Former    Substances: Nicotine (only used 1-2 times; then quit)   Substance and Sexual Activity    Alcohol use: Not Currently     Alcohol/week: 0.0 standard drinks     Comment: socially- average \"one time per week lbut none since started getting ready for [employer]'    Drug use: Never    Sexual activity: Yes     Partners: Male   Other Topics Concern    Not on file   Social History Narrative    Not on file     Social Determinants of Health     Financial Resource Strain: Not on file   Food Insecurity: Not on file   Transportation Needs: Not on file   Physical Activity: Not on file   Stress: Not on file   Social Connections: Not on file   Intimate Partner Violence: Not on file   Housing Stability: Not on file       Current Outpatient Medications   Medication Sig Dispense Refill    omeprazole (PRILOSEC) 40 MG delayed release capsule Take 1 capsule by mouth every morning (before breakfast) 30 capsule 2    esomeprazole (NEXIUM) 40 MG delayed release capsule Take 1 capsule by mouth every morning (before breakfast) (Patient not taking: No sig reported) 90 capsule 1    omeprazole (PRILOSEC) 40 MG delayed release capsule Take 1 capsule by mouth every morning (before breakfast) (Patient not taking: No sig reported) 90 capsule 1    aspirin 81 MG chewable tablet Take 1 tablet by mouth daily (Patient not taking: No sig reported) 30 tablet 3    ondansetron (ZOFRAN) 4 MG tablet Take 4 mg by mouth every 8 hours as needed for Nausea or Vomiting (Patient not taking: No sig reported)       No current facility-administered medications for this visit.       Allergies   Allergen Reactions    Wool Alcohol [Lanolin] Hives     \"wool\" ; hives/ blisters       Review of Systems:         Review of Systems   Gastrointestinal:  Positive for abdominal pain (RUQ/epigastric) and diarrhea. OBJECTIVE:  Physical Exam:    BP (!) 118/58 (Site: Right Upper Arm, Position: Sitting, Cuff Size: Large Adult)   Ht 5' 5\" (1.651 m)   Wt 144 lb 6.4 oz (65.5 kg)   BMI 24.03 kg/m²      Physical Exam  Vitals reviewed. Constitutional:       General: She is not in acute distress. Appearance: She is not ill-appearing, toxic-appearing or diaphoretic. HENT:      Head: Normocephalic and atraumatic. Right Ear: External ear normal.      Left Ear: External ear normal.   Eyes:      General:         Right eye: No discharge. Left eye: No discharge. Extraocular Movements: Extraocular movements intact. Cardiovascular:      Rate and Rhythm: Normal rate. Pulmonary:      Effort: No respiratory distress. Musculoskeletal:         General: No swelling. Skin:     Coloration: Skin is not jaundiced. Neurological:      General: No focal deficit present. Mental Status: She is alert. US:  Unremarkable right upper quadrant ultrasound. No sonographic evidence of   cholelithiasis or acute cholecystitis seen. HIDA:  1.  Gallbladder ejection fraction is 81%. 2. No acute cholecystitis. ASSESSMENT:  1. Diarrhea, unspecified type    2. Epigastric pain    3. History of sleeve gastrectomy          PLAN:  Treatment:      -Reviewed U/S and HIDA results with pt. Unremarkable. Do not suspect symptoms related to GB. -Given epigastric symptoms and diarrhea will refer pt to GI (Dr. Nicole Arellano). May require upper and lower scope. -Smoking cessation.   -Continue to avoid NSAIDs.  -BMI 24.03 from 42.05. Down 101.1 lbs since surgery and 111.1 lbs since starting program. Reviews all bariatric labs with pt and here lipid panel has normalized and all other labs are appropriate.    -F/u with me after seen by GI.   -Pt encouraged to continue exercising. Minimum goal of 150 minutes per week with ideal goal of 300 min per week. Exercise regimen should include at least 2-3 sessions per week of strength training. These recommendations are current with the most recent ASMBS guidelines. -Daily MVI. -Increase protein to 60+ g / d, calories to 600-800 / d. May benefit from meeting with CAR Castañeda Patient counseled on risks, benefits, and alternatives of treatment plan at length today. Patient states an understanding and willingness to proceed with plan. Orders Placed This Encounter   Procedures    David Mustafa MD, Gastroenterology, [facility]        No orders of the defined types were placed in this encounter. Follow Up:  No follow-ups on file.       Rosalie Bustillos MD

## 2023-03-01 ENCOUNTER — OFFICE VISIT (OUTPATIENT)
Dept: GASTROENTEROLOGY | Age: 33
End: 2023-03-01
Payer: COMMERCIAL

## 2023-03-01 VITALS
TEMPERATURE: 97.3 F | SYSTOLIC BLOOD PRESSURE: 110 MMHG | WEIGHT: 145.8 LBS | HEIGHT: 65 IN | DIASTOLIC BLOOD PRESSURE: 62 MMHG | HEART RATE: 64 BPM | BODY MASS INDEX: 24.29 KG/M2

## 2023-03-01 DIAGNOSIS — K52.9 CHRONIC DIARRHEA: ICD-10-CM

## 2023-03-01 DIAGNOSIS — R10.84 GENERALIZED ABDOMINAL PAIN: Primary | ICD-10-CM

## 2023-03-01 DIAGNOSIS — Z98.84 S/P LAPAROSCOPIC SLEEVE GASTRECTOMY: ICD-10-CM

## 2023-03-01 DIAGNOSIS — K64.2 GRADE III HEMORRHOIDS: ICD-10-CM

## 2023-03-01 PROCEDURE — 99204 OFFICE O/P NEW MOD 45 MIN: CPT | Performed by: INTERNAL MEDICINE

## 2023-03-01 PROCEDURE — G8420 CALC BMI NORM PARAMETERS: HCPCS | Performed by: INTERNAL MEDICINE

## 2023-03-01 PROCEDURE — G8427 DOCREV CUR MEDS BY ELIG CLIN: HCPCS | Performed by: INTERNAL MEDICINE

## 2023-03-01 PROCEDURE — 1036F TOBACCO NON-USER: CPT | Performed by: INTERNAL MEDICINE

## 2023-03-01 PROCEDURE — G8484 FLU IMMUNIZE NO ADMIN: HCPCS | Performed by: INTERNAL MEDICINE

## 2023-03-01 RX ORDER — SODIUM CHLORIDE 9 MG/ML
25 INJECTION, SOLUTION INTRAVENOUS PRN
OUTPATIENT
Start: 2023-03-01

## 2023-03-01 RX ORDER — SODIUM CHLORIDE 0.9 % (FLUSH) 0.9 %
5-40 SYRINGE (ML) INJECTION PRN
OUTPATIENT
Start: 2023-03-01

## 2023-03-01 RX ORDER — SODIUM, POTASSIUM,MAG SULFATES 17.5-3.13G
177 SOLUTION, RECONSTITUTED, ORAL ORAL ONCE
Qty: 177 ML | Refills: 0 | Status: SHIPPED | OUTPATIENT
Start: 2023-03-01 | End: 2023-03-01

## 2023-03-01 RX ORDER — SODIUM CHLORIDE 0.9 % (FLUSH) 0.9 %
5-40 SYRINGE (ML) INJECTION EVERY 12 HOURS SCHEDULED
OUTPATIENT
Start: 2023-03-01

## 2023-03-01 NOTE — H&P (VIEW-ONLY)
Future     Standing Expiration Date:   3/1/2024    IgA     Standing Status:   Future     Standing Expiration Date:   3/1/2024    Tissue Transglutaminase, IgA     Standing Status:   Future     Standing Expiration Date:   3/1/2024    PANCREATIC ELASTASE, FECAL     Standing Status:   Future     Standing Expiration Date:   3/1/2024    Initiate PAT Protocol     Standing Status:   Future     Standing Expiration Date:   4/30/2023     #1 generalized abdominal pain  She does have an epigastric predominant component which is worse with food. Her generalized abdominal pain is more so crampy in nature associated also with food and bowel movements. She has been suffering from diarrhea but has bouts of constipation with antidiarrheals over-the-counter. Plan for upper endoscopy  Check celiac screen    #2 chronic diarrhea  Check celiac screen, fecal calprotectin, pancreatic fecal elastase. No melena or hematochezia. Prior history of pancreatitis  Plan for colonoscopy with biopsy    #3 status post laparoscopic sleeve gastrectomy in November 2021  Follows up with Dr. Loren Oscar  Last 101 pounds since her surgery    #4 hemorrhoids  Currently being treated by surgeon that she is following. Mentions has had 2 treatments prior with with her. No follow-ups on file.     Brianna Stockton MD 3/1/2023 8:59 AM     Time of note may not reflect time of encounter         CC: Referring MD

## 2023-03-01 NOTE — PROGRESS NOTES
[address] [facility] Gastroenterology and Hepatology             MD Beti Iniguez MD Arlis Shannon, APRN-CNP             [address] [facility] [facility], [address] [facility]             [phone] fax [phone]        Gastroenterology Clinic Consultation    Beti Childers MD  Encounter Date: 03/01/23     CC: Diarrhea (Pt. States she has been having sx for over a month. She has tried OTC anti diarrheal but they make her constipated and her abd pain worse. ) and Abdominal Pain (Pt. States pain is different depending on what food she eats. Spicy and high protein food gives her immediate pain and urgency to go to the bathroom. Dairy gives her cramping pain. )       Johnny Blood MD  [address] . [address] [address]     History obtained from: patient, medical records     Subjective:       Afia Monday is an 33 y. o.  female with past medical history of obesity status post sleeve gastrectomy, prediabetes, obesity who presents for Diarrhea (Pt. States she has been having sx for over a month. She has tried OTC anti diarrheal but they make her constipated and her abd pain worse. ) and Abdominal Pain (Pt. States pain is different depending on what food she eats. Spicy and high protein food gives her immediate pain and urgency to go to the bathroom. Dairy gives her cramping pain. )  . According to the patient she has been having diarrhea for the past few weeks. She mentions intermittently she has tried over-the-counter antidiarrheals but that has not been helpful and she feels constipated and backed up. All of this is also accompanied with generalized abdominal pain but sometimes predominantly epigastric. She feels that this is associated closely to what type of food she eats with spicy foods exacerbating her symptoms worse. She has noted however that diary will give her bloating, cramping pain. Since her sleeve she has lost about 101 pounds.   She denies any family history of colon cancer but does have family history of Crohn's disease in her mom. She also mentions that she has been struggling with hemorrhoids and fissure. She is following up with the surgeon and has had hemorrhoid treatments twice in the past.  She mentions that she has had episodes of pancreatitis in the past with every pregnancy including 1 where she was hospitalized for several days but without any mention of necrosis or any necrosectomy's or ICU stay    Most recent labs noted to be unremarkable with normal LFTs, lipase normal creatinine. CBC with mild normocytic anemia with a hemoglobin of 12.1. She had an ultrasound of the abdomen done with unremarkable right upper quadrant ultrasound normal CBD without any biliary ductal dilation. This was also followed up with a HIDA scan with normal gallbladder ejection fraction and no acute cholecystitis    Patient Active Problem List   Diagnosis    Weight gain    Pre-diabetes    Lesion of buccal mucosa    Tobacco use    Obesity    Moderate episode of recurrent major depressive disorder (HCC)    Anxiety    Positive depression screening    Generalized body aches    Viral URI    Morbid obesity with BMI of 40.0-44.9, adult (HCC)    Grade III hemorrhoids    Morbid obesity (HCC)    Drooping eyelid    Facial droop    Acute non intractable tension-type headache         Past Medical History:   Diagnosis Date    Depression     Diabetes mellitus ([facility] [address]     type 2-diet controlled. \"had gestational diabetes and my hbgA1c was 6.1- off medications for the past 2 yrs now just diet controlled    GERD (gastroesophageal reflux disease)     Group B streptococcal infection during pregnancy      (normal spontaneous vaginal delivery) 7-2-15    delivered in ambulance 12 minutes    Obesity, unspecified     Pancreatitis chronic     Problems with hearing     dr Carroll Bajwa; only due to when she had a cold        Past Surgical History:   Procedure Laterality Date    ENDOSCOPY, COLON, DIAGNOSTIC      HEMORRHOID SURGERY      x3- last time 2019    SLEEVE GASTRECTOMY N/A 2021    GASTRECTOMY SLEEVE LAPAROSCOPIC ROBOTIC performed by Michell Davidson MD at [address] [facility]  2015    UPPER GASTROINTESTINAL ENDOSCOPY N/A 2021    EGD BIOPSY performed by Ivone Santos MD at [address] [facility]      reconstruction between vag/anal area from childbirth        Family History   Adopted: Yes   Problem Relation Age of Onset    Breast Cancer Mother     Ovarian Cancer Mother     No Known Problems Father     Other Sister         autistic (1 sister)    Cancer Brother         eye, testicular        Social History     Socioeconomic History    Marital status:      Spouse name: Not on file    Number of children: Not on file    Years of education: Not on file    Highest education level: Not on file   Occupational History    Not on file   Tobacco Use    Smoking status: Former     Packs/day: 0.50     Years: 8.00     Pack years: 4.00     Types: Cigarettes     Quit date: 2021     Years since quittin.0    Smokeless tobacco: Never   Vaping Use    Vaping Use: Former    Substances: Nicotine (only used 1-2 times; then quit)   Substance and Sexual Activity    Alcohol use: Not Currently     Alcohol/week: 0.0 standard drinks     Comment: socially- average \"one time per week lbut none since started getting ready for [employer] [facility]'    Drug use: Never    Sexual activity: Yes     Partners: Male   Other Topics Concern    Not on file   Social History Narrative    Not on file     Social Determinants of Health     Financial Resource Strain: Not on file   Food Insecurity: Not on file   Transportation Needs: Not on file   Physical Activity: Not on file   Stress: Not on file   Social Connections: Not on file   Intimate Partner Violence: Not on file   Housing Stability: Not on file        Social History     Social History Narrative    Not on file           Review of Systems  Reviewed all 14 systems with patient/family member. Pertinent items in HPI. Medications:    Current Outpatient Medications:     sodium-potassium-mag sulfate (SUPREP) 17.5-3.13-1.6 GM/177ML SOLN solution, Take 177 mLs by mouth once for 1 dose, Disp: 177 mL, Rfl: 0    omeprazole (PRILOSEC) 40 MG delayed release capsule, Take 1 capsule by mouth every morning (before breakfast), Disp: 30 capsule, Rfl: 2    aspirin 81 MG chewable tablet, Take 1 tablet by mouth daily (Patient not taking: No sig reported), Disp: 30 tablet, Rfl: 3    ondansetron (ZOFRAN) 4 MG tablet, Take 4 mg by mouth every 8 hours as needed for Nausea or Vomiting (Patient not taking: No sig reported), Disp: , Rfl:      Allergies:  Wool alcohol [lanolin]     Objective:    Blood pressure 110/62, pulse 64, temperature 97.3 °F (36.3 °C), temperature source Infrared, height 5' 5\" (1.651 m), weight 145 lb 12.8 oz (66.1 kg), not currently breastfeeding. Exam done in the presence of chaperone Gosia  General appearance: alert, cooperative, no distress, appears stated age  Head: Normocephalic, without obvious abnormality, atraumatic  Eyes: conjunctivae/corneas clear. EOM's intact. Nose: Nares normal. No discharge  Throat: Lips, mucosa, and tongue normal. Teeth and gums normal  Neck: supple, symmetrical, trachea midline and no adenopathy  Back: symmetric, no curvature. No CVA tenderness. , no kyphosis present, no scoliosis present  Lungs: clear to auscultation bilaterally, no wheezes, rales, or ronchi, normal respiratory effort  Heart: regular rate and rhythm, S1, S2 normal, no murmur, click, rub or gallop  Abdomen: soft, non-tender. No masses,  no hepatospleenomegally.   Rectal: Mild prolapsed hemorrhoids, no thrombosis, rectal vault empty, no blood on finger show, fissure in the 6 o'clock position, improving  Extremities: extremities normal, atraumatic, no cyanosis or edema  Skin: Skin color, texture, turgor normal. No rashes or lesions  Neurologic: Non focal, speech clear,   Psychiatry: Mood appropriate, no evidence of psychosis        Labs: Reviewed last labs/outside records          Assessment and Plan:  Melissa Schulz was seen today for diarrhea and abdominal pain. Diagnoses and all orders for this visit:    Generalized abdominal pain  -     EGD Routine; Future    Chronic diarrhea  -     Calprotectin Stool; Future  -     CBC; Future  -     C-Reactive Protein; Future  -     IgA; Future  -     Tissue Transglutaminase, IgA; Future  -     COLONOSCOPY (Diagnostic); Future  -     PANCREATIC ELASTASE, FECAL; Future    S/P laparoscopic sleeve gastrectomy  -     EGD Routine; Future    Grade III hemorrhoids    Other orders  -     sodium-potassium-mag sulfate (SUPREP) 17.5-3.13-1.6 GM/177ML SOLN solution; Take 177 mLs by mouth once for 1 dose  -     Initiate PAT Protocol; Future  -     Diet NPO Exceptions are: Sips of Water with Meds; Standing  -     Verify informed consent; Standing  -     Verify pre-procedure history and physical completed; Standing  -     sodium chloride flush 0.9 % injection 5-40 mL  -     sodium chloride flush 0.9 % injection 5-40 mL  -     0.9 % sodium chloride infusion  -     Full Code; Standing     Diagnosis Orders   1. Generalized abdominal pain  EGD Routine      2. Chronic diarrhea  Calprotectin Stool    CBC    C-Reactive Protein    IgA    Tissue Transglutaminase, IgA    COLONOSCOPY (Diagnostic)    PANCREATIC ELASTASE, FECAL      3. S/P laparoscopic sleeve gastrectomy  EGD Routine      4. Grade III hemorrhoids          Orders Placed This Encounter   Procedures    COLONOSCOPY (Diagnostic)     Standing Status:   Future     Standing Expiration Date:   9/1/2023     Order Specific Question:   Screening or Diagnostic?      Answer:   Diagnostic    Calprotectin Stool     Standing Status:   Future     Standing Expiration Date:   3/1/2024    CBC     Standing Status:   Future     Standing Expiration Date:   3/1/2024    C-Reactive Protein     Standing Status: Future     Standing Expiration Date:   3/1/2024    IgA     Standing Status:   Future     Standing Expiration Date:   3/1/2024    Tissue Transglutaminase, IgA     Standing Status:   Future     Standing Expiration Date:   3/1/2024    PANCREATIC ELASTASE, FECAL     Standing Status:   Future     Standing Expiration Date:   3/1/2024    Initiate PAT Protocol     Standing Status:   Future     Standing Expiration Date:   4/30/2023     #1 generalized abdominal pain  She does have an epigastric predominant component which is worse with food. Her generalized abdominal pain is more so crampy in nature associated also with food and bowel movements. She has been suffering from diarrhea but has bouts of constipation with antidiarrheals over-the-counter. Plan for upper endoscopy  Check celiac screen    #2 chronic diarrhea  Check celiac screen, fecal calprotectin, pancreatic fecal elastase. No melena or hematochezia. Prior history of pancreatitis  Plan for colonoscopy with biopsy    #3 status post laparoscopic sleeve gastrectomy in November 2021  Follows up with Dr. Mariya Oneil  Last 101 pounds since her surgery    #4 hemorrhoids  Currently being treated by surgeon that she is following. Mentions has had 2 treatments prior with with her. No follow-ups on file.     Neida Tapia MD 3/1/2023 8:59 AM     Time of note may not reflect time of encounter         CC: Referring MD

## 2023-03-21 ENCOUNTER — HOSPITAL ENCOUNTER (OUTPATIENT)
Age: 33
Discharge: HOME | End: 2023-03-21
Payer: COMMERCIAL

## 2023-03-21 DIAGNOSIS — K52.9 CHRONIC DIARRHEA: ICD-10-CM

## 2023-03-21 LAB
CRP SERPL HS-MCNC: 3 MG/L
HCT VFR BLD CALC: 38.8 % (ref 37–47)
HEMOGLOBIN: 12.3 GM/DL (ref 12.5–16)
IGA: 277 MG/DL (ref 69–382)
MCH RBC QN AUTO: 29.6 PG (ref 27–31)
MCHC RBC AUTO-ENTMCNC: 31.7 % (ref 32–36)
MCV RBC AUTO: 93.5 FL (ref 78–100)
PDW BLD-RTO: 13.6 % (ref 11.7–14.9)
PLATELET # BLD: 241 K/CU MM (ref 140–440)
PMV BLD AUTO: 10.6 FL (ref 7.5–11.1)
RBC # BLD: 4.15 M/CU MM (ref 4.2–5.4)
WBC # BLD: 9.3 K/CU MM (ref 4–10.5)

## 2023-03-21 PROCEDURE — 85027 COMPLETE CBC AUTOMATED: CPT

## 2023-03-21 PROCEDURE — 82784 ASSAY IGA/IGD/IGG/IGM EACH: CPT

## 2023-03-21 PROCEDURE — 83516 IMMUNOASSAY NONANTIBODY: CPT

## 2023-03-21 PROCEDURE — 36415 COLL VENOUS BLD VENIPUNCTURE: CPT

## 2023-03-21 PROCEDURE — 86140 C-REACTIVE PROTEIN: CPT

## 2023-03-24 LAB — TTG IGA SER IA-ACNC: <2 U/ML (ref 0–3)

## 2023-03-27 ENCOUNTER — ANESTHESIA EVENT (OUTPATIENT)
Dept: OPERATING ROOM | Age: 33
End: 2023-03-27
Payer: COMMERCIAL

## 2023-03-28 ENCOUNTER — HOSPITAL ENCOUNTER (OUTPATIENT)
Age: 33
Setting detail: OUTPATIENT SURGERY
Discharge: HOME | End: 2023-03-28
Attending: INTERNAL MEDICINE | Admitting: INTERNAL MEDICINE
Payer: COMMERCIAL

## 2023-03-28 ENCOUNTER — ANESTHESIA (OUTPATIENT)
Dept: OPERATING ROOM | Age: 33
End: 2023-03-28
Payer: COMMERCIAL

## 2023-03-28 VITALS
RESPIRATION RATE: 16 BRPM | HEART RATE: 73 BPM | TEMPERATURE: 97.6 F | OXYGEN SATURATION: 99 % | DIASTOLIC BLOOD PRESSURE: 61 MMHG | BODY MASS INDEX: 22.99 KG/M2 | SYSTOLIC BLOOD PRESSURE: 100 MMHG | HEIGHT: 65 IN | WEIGHT: 138 LBS

## 2023-03-28 DIAGNOSIS — K52.9 CHRONIC DIARRHEA: ICD-10-CM

## 2023-03-28 DIAGNOSIS — Z98.84 S/P LAPAROSCOPIC SLEEVE GASTRECTOMY: ICD-10-CM

## 2023-03-28 DIAGNOSIS — R10.84 GENERALIZED ABDOMINAL PAIN: ICD-10-CM

## 2023-03-28 LAB — PREGNANCY TEST URINE, POC: NEGATIVE

## 2023-03-28 PROCEDURE — 88305 TISSUE EXAM BY PATHOLOGIST: CPT | Performed by: PATHOLOGY

## 2023-03-28 PROCEDURE — 88342 IMHCHEM/IMCYTCHM 1ST ANTB: CPT | Performed by: PATHOLOGY

## 2023-03-28 PROCEDURE — 88312 SPECIAL STAINS GROUP 1: CPT | Performed by: PATHOLOGY

## 2023-03-28 PROCEDURE — 3609010600 HC COLONOSCOPY POLYPECTOMY SNARE/COLD BIOPSY: Performed by: INTERNAL MEDICINE

## 2023-03-28 PROCEDURE — 6360000002 HC RX W HCPCS

## 2023-03-28 PROCEDURE — 3609012400 HC EGD TRANSORAL BIOPSY SINGLE/MULTIPLE: Performed by: INTERNAL MEDICINE

## 2023-03-28 PROCEDURE — 3700000000 HC ANESTHESIA ATTENDED CARE: Performed by: INTERNAL MEDICINE

## 2023-03-28 PROCEDURE — 7100000010 HC PHASE II RECOVERY - FIRST 15 MIN: Performed by: INTERNAL MEDICINE

## 2023-03-28 PROCEDURE — 3700000001 HC ADD 15 MINUTES (ANESTHESIA): Performed by: INTERNAL MEDICINE

## 2023-03-28 PROCEDURE — 2709999900 HC NON-CHARGEABLE SUPPLY: Performed by: INTERNAL MEDICINE

## 2023-03-28 PROCEDURE — 7100000011 HC PHASE II RECOVERY - ADDTL 15 MIN: Performed by: INTERNAL MEDICINE

## 2023-03-28 PROCEDURE — 81025 URINE PREGNANCY TEST: CPT

## 2023-03-28 RX ORDER — SODIUM CHLORIDE 0.9 % (FLUSH) 0.9 %
5-40 SYRINGE (ML) INJECTION PRN
Status: DISCONTINUED | OUTPATIENT
Start: 2023-03-28 | End: 2023-03-28 | Stop reason: HOSPADM

## 2023-03-28 RX ORDER — SODIUM CHLORIDE, SODIUM LACTATE, POTASSIUM CHLORIDE, CALCIUM CHLORIDE 600; 310; 30; 20 MG/100ML; MG/100ML; MG/100ML; MG/100ML
INJECTION, SOLUTION INTRAVENOUS CONTINUOUS
Status: DISCONTINUED | OUTPATIENT
Start: 2023-03-28 | End: 2023-03-28 | Stop reason: HOSPADM

## 2023-03-28 RX ORDER — SODIUM CHLORIDE 0.9 % (FLUSH) 0.9 %
5-40 SYRINGE (ML) INJECTION EVERY 12 HOURS SCHEDULED
Status: DISCONTINUED | OUTPATIENT
Start: 2023-03-28 | End: 2023-03-28 | Stop reason: HOSPADM

## 2023-03-28 RX ORDER — SODIUM CHLORIDE 9 MG/ML
25 INJECTION, SOLUTION INTRAVENOUS PRN
Status: DISCONTINUED | OUTPATIENT
Start: 2023-03-28 | End: 2023-03-28 | Stop reason: HOSPADM

## 2023-03-28 RX ORDER — LIDOCAINE HYDROCHLORIDE 20 MG/ML
INJECTION, SOLUTION INTRAVENOUS PRN
Status: DISCONTINUED | OUTPATIENT
Start: 2023-03-28 | End: 2023-03-28 | Stop reason: SDUPTHER

## 2023-03-28 RX ORDER — PROPOFOL 10 MG/ML
INJECTION, EMULSION INTRAVENOUS PRN
Status: DISCONTINUED | OUTPATIENT
Start: 2023-03-28 | End: 2023-03-28 | Stop reason: SDUPTHER

## 2023-03-28 RX ORDER — HYDROCORTISONE ACETATE 25 MG/1
25 SUPPOSITORY RECTAL
Qty: 14 SUPPOSITORY | Refills: 0 | Status: SHIPPED | OUTPATIENT
Start: 2023-03-28

## 2023-03-28 RX ADMIN — SODIUM CHLORIDE, SODIUM LACTATE, POTASSIUM CHLORIDE, CALCIUM CHLORIDE: 600; 310; 30; 20 INJECTION, SOLUTION INTRAVENOUS at 07:41

## 2023-03-28 RX ADMIN — PROPOFOL 670 MG: 10 INJECTION, EMULSION INTRAVENOUS at 08:58

## 2023-03-28 RX ADMIN — LIDOCAINE HYDROCHLORIDE 100 MG: 20 INJECTION, SOLUTION INTRAVENOUS at 08:58

## 2023-03-28 ASSESSMENT — PAIN SCALES - GENERAL: PAINLEVEL_OUTOF10: 0

## 2023-03-28 ASSESSMENT — PAIN - FUNCTIONAL ASSESSMENT: PAIN_FUNCTIONAL_ASSESSMENT: 0-10

## 2023-03-28 NOTE — PROGRESS NOTES
Returned to room A. Report received from [facility]. Alert and oriented. Respirations even and unlabored. Color pink. Abdomen soft and nontender. Beverage provided. Call light in reach.

## 2023-03-28 NOTE — INTERVAL H&P NOTE
Update History & Physical    The patient's History and Physical of March 1, 2023 was reviewed with the patient and I examined the patient. There was no change. The surgical site was confirmed by the patient and me. Plan: The risks, benefits, expected outcome, and alternative to the recommended procedure have been discussed with the patient. Patient understands and wants to proceed with the procedure.      Electronically signed by Yana Masterson MD on 3/28/2023 at 7:40 AM

## 2023-03-28 NOTE — ANESTHESIA PRE PROCEDURE
40.0-44.9, adult ([facility] E66.01, Z68.41    Grade III hemorrhoids K64.2    Morbid obesity (HCC) E66.01    Drooping eyelid H02.409    Facial droop R29.810    Acute non intractable tension-type headache G44.209       Past Medical History:        Diagnosis Date    Depression     Diabetes mellitus ([facility]     type 2-diet controlled. \"had gestational diabetes and my hbgA1c was 6.1- off medications for the past 2 yrs now just diet controlled    GERD (gastroesophageal reflux disease)     Group B streptococcal infection during pregnancy      (normal spontaneous vaginal delivery) 7-2-15    delivered in ambulance 12 minutes    Obesity, unspecified     Pancreatitis chronic     Problems with hearing     dr [facility]; only due to when she had a cold       Past Surgical History:        Procedure Laterality Date    ENDOSCOPY, COLON, DIAGNOSTIC      HEMORRHOID SURGERY      x3- last time 2019    SLEEVE GASTRECTOMY N/A 2021    GASTRECTOMY SLEEVE LAPAROSCOPIC ROBOTIC performed by Kristine Mancilla MD at [address] [place] [address]  2015    UPPER GASTROINTESTINAL ENDOSCOPY N/A 2021    EGD BIOPSY performed by Austen Beck MD at [address] [place] [address],[address]      reconstruction between vag/anal area from childbirth    WISDOM TOOTH EXTRACTION N/A        Social History:    Social History     Tobacco Use    Smoking status: Former     Packs/day: 0.50     Years: 8.00     Pack years: 4.00     Types: Cigarettes     Quit date: 2021     Years since quittin.0    Smokeless tobacco: Never   Substance Use Topics    Alcohol use: Not Currently     Alcohol/week: 0.0 standard drinks     Comment: socially- average \"one time per week lbut none since started getting ready for [employer] [facility]'                                Counseling given: Not Answered      Vital Signs (Current):   Vitals:    23 1346   Weight: 138 lb (62.6 kg)   Height: 5' 5\" (1.651 m)                                              BP
(If Applicable):  No results found for: LABABO, [address]    Drug/Infectious Status (If Applicable):  No results found for: HIV, HEPCAB    COVID-19 Screening (If Applicable):   Lab Results   Component Value Date/Time    COVID19 NOT DETECTED 10/25/2021 08:30 AM           Anesthesia Evaluation    Airway: Mallampati: II  TM distance: >3 FB   Neck ROM: full  Mouth opening: > = 3 FB   Dental: normal exam         Pulmonary:Negative Pulmonary ROS                              Cardiovascular:                      Neuro/Psych:   (+) psychiatric history:depression/anxiety             GI/Hepatic/Renal:   (+) GERD:, bowel prep,           Endo/Other:    (+) Diabetes, . Abdominal:             Vascular: Other Findings:           Anesthesia Plan      MAC     ASA 2     (Chart review)  Induction: intravenous. Anesthetic plan and risks discussed with patient. Plan discussed with CRNA.                     CARLEE De Los Santos - NATALIIA   3/28/2023

## 2023-03-28 NOTE — ANESTHESIA POSTPROCEDURE EVALUATION
Department of Anesthesiology  Postprocedure Note    Patient: Selena Burton  MRN: [id number]  YOB: 1990  Date of evaluation: 3/28/2023      Procedure Summary     Date: 03/28/23 Room / Location: [place] [id number] 05 / [facility]    Anesthesia Start: 2085 Anesthesia Stop: 6316    Procedures:       COLONOSCOPY POLYPECTOMY SNARE/COLD BIOPSY      EGD BIOPSY Diagnosis:       S/P laparoscopic sleeve gastrectomy      Generalized abdominal pain      Chronic diarrhea      (CHRONIC DAIRREHEA , S/P GASTRECTOMY , ABD PAIN)    Surgeons: Shelley Fermin MD Responsible Provider: CARLEE Payne CRNA    Anesthesia Type: MAC ASA Status: 2          Anesthesia Type: No value filed.     Dejah Phase I: Dejah Score: 10    Dejah Phase II:        Anesthesia Post Evaluation    Patient location during evaluation: bedside  Patient participation: complete - patient participated  Level of consciousness: awake  Pain score: 0  Airway patency: patent  Nausea & Vomiting: no nausea and no vomiting  Complications: no  Cardiovascular status: blood pressure returned to baseline and hemodynamically stable  Respiratory status: acceptable and room air  Hydration status: euvolemic

## 2023-03-28 NOTE — DISCHARGE INSTRUCTIONS
Care instructions adapted under license by [facility] ([facility]). This care instruction is for use with your licensed healthcare professional. If you have questions about a medical condition or this instruction, always ask your healthcare professional. Scooby Cordero any warranty or liability for your use of this information. Content Version: 04.7.161172; Current as of: November 20, 2015               [facility]  [phone]    Do not drive, work around [address] [place] [address] or use equipment. Do not drink any alcoholic beverages. Do not smoke while alone. Avoid making important decisions. Plan to spend a quiet, relaxed evening @ home. Resume normal activities as you begin to feel better. Eat lightly for your first meal, then gradually increase your diet to what is normal for you. In case of nausea, avoid food and drink only clear liquids. Resume food as nausea ceases. Notify your surgeon if you experience fever, chills, large amount of bleeding, difficulty breathing, persistent nausea and vomiting or any other disturbing problem. Call for a follow-up appointment with your surgeon.

## 2023-03-28 NOTE — PROGRESS NOTES
Pt alert and oriented x 4. Pre-op questions asked and answered appropriately by pt. Name, , armband verified, procedure, allergies, implants, OB status, prep status, last PO intake, history, meds.

## 2023-03-30 ENCOUNTER — E-VISIT (OUTPATIENT)
Dept: FAMILY MEDICINE CLINIC | Age: 33
End: 2023-03-30
Payer: COMMERCIAL

## 2023-03-30 DIAGNOSIS — N76.0 ACUTE VAGINITIS: Primary | ICD-10-CM

## 2023-03-30 PROCEDURE — 99421 OL DIG E/M SVC 5-10 MIN: CPT | Performed by: PHYSICIAN ASSISTANT

## 2023-03-30 RX ORDER — FLUCONAZOLE 150 MG/1
150 TABLET ORAL ONCE
Qty: 1 TABLET | Refills: 0 | Status: SHIPPED | OUTPATIENT
Start: 2023-03-30 | End: 2023-03-30

## 2023-03-30 RX ORDER — NYSTATIN 100000 U/G
CREAM TOPICAL
Qty: 60 G | Refills: 5 | Status: SHIPPED | OUTPATIENT
Start: 2023-03-30

## 2023-03-30 NOTE — PROGRESS NOTES
Dealentra (1990) initiated an asynchronous digital communication through [facility]. HPI: per patient questionnaire     Exam: not applicable    Diagnoses and all orders for this visit:  There are no diagnoses linked to this encounter. Time: EV1 - 5-10 minutes were spent on the digital evaluation and management of this patient.     Abbey Tsang PA-C

## 2024-01-17 ENCOUNTER — OFFICE VISIT (OUTPATIENT)
Dept: FAMILY MEDICINE CLINIC | Age: 34
End: 2024-01-17
Payer: COMMERCIAL

## 2024-01-17 VITALS
HEIGHT: 65 IN | SYSTOLIC BLOOD PRESSURE: 104 MMHG | BODY MASS INDEX: 22.82 KG/M2 | RESPIRATION RATE: 16 BRPM | WEIGHT: 137 LBS | TEMPERATURE: 97.9 F | DIASTOLIC BLOOD PRESSURE: 72 MMHG

## 2024-01-17 DIAGNOSIS — J10.1 INFLUENZA A: Primary | ICD-10-CM

## 2024-01-17 DIAGNOSIS — J06.9 VIRAL URI WITH COUGH: ICD-10-CM

## 2024-01-17 LAB
INFLUENZA A ANTIGEN, POC: POSITIVE
INFLUENZA B ANTIGEN, POC: NEGATIVE
LOT EXPIRE DATE: ABNORMAL
LOT KIT NUMBER: ABNORMAL
SARS-COV-2, POC: ABNORMAL
VALID INTERNAL CONTROL: ABNORMAL
VENDOR AND KIT NAME POC: ABNORMAL

## 2024-01-17 PROCEDURE — 87428 SARSCOV & INF VIR A&B AG IA: CPT | Performed by: NURSE PRACTITIONER

## 2024-01-17 PROCEDURE — 99213 OFFICE O/P EST LOW 20 MIN: CPT | Performed by: NURSE PRACTITIONER

## 2024-01-17 RX ORDER — FLUTICASONE PROPIONATE 50 MCG
1 SPRAY, SUSPENSION (ML) NASAL DAILY
Qty: 16 G | Refills: 0 | Status: SHIPPED | OUTPATIENT
Start: 2024-01-17

## 2024-01-17 RX ORDER — OSELTAMIVIR PHOSPHATE 75 MG/1
75 CAPSULE ORAL 2 TIMES DAILY
Qty: 10 CAPSULE | Refills: 0 | Status: SHIPPED | OUTPATIENT
Start: 2024-01-17 | End: 2024-01-22

## 2024-01-17 RX ORDER — PSEUDOEPHEDRINE HCL 30 MG
30 TABLET ORAL EVERY 4 HOURS PRN
COMMUNITY

## 2024-01-17 RX ORDER — BENZONATATE 100 MG/1
100 CAPSULE ORAL 3 TIMES DAILY PRN
Qty: 20 CAPSULE | Refills: 0 | Status: SHIPPED | OUTPATIENT
Start: 2024-01-17

## 2024-01-17 ASSESSMENT — ENCOUNTER SYMPTOMS
RHINORRHEA: 1
SINUS PAIN: 1
SORE THROAT: 1
VOMITING: 0
ABDOMINAL PAIN: 1
SWOLLEN GLANDS: 0
SHORTNESS OF BREATH: 1
NAUSEA: 0
COUGH: 1
WHEEZING: 1
DIARRHEA: 1
CHEST TIGHTNESS: 1
SINUS PRESSURE: 1

## 2024-01-17 NOTE — PROGRESS NOTES
Judy L Santosh   33 y.o.  female  [id number]      Chief Complaint   Patient presents with    URI        Subjective:  33 y.o.female is here for a follow up. She has the following chronic/acute medical problems:  Patient Active Problem List   Diagnosis    Weight gain    Pre-diabetes    Lesion of buccal mucosa    Tobacco use    Obesity    Moderate episode of recurrent major depressive disorder (HCC)    Anxiety    Positive depression screening    Generalized body aches    Viral URI    Morbid obesity with BMI of 40.0-44.9, adult (HCC)    Grade III hemorrhoids    Morbid obesity (HCC)    Drooping eyelid    Facial droop    Acute non intractable tension-type headache    S/P laparoscopic sleeve gastrectomy    Generalized abdominal pain    Chronic diarrhea       URI   This is a new problem. The current episode started 1 to 4 weeks ago (states 9 days ago had cold like symptoms but on Saturday when these symptoms started). The problem has been gradually worsening. The maximum temperature recorded prior to her arrival was more than 104 F (104.4 on Monday, took a cold bath). Associated symptoms include abdominal pain, congestion, coughing (states coughing up phlegm), diarrhea, ear pain, a plugged ear sensation, rhinorrhea, sinus pain, a sore throat and wheezing. Pertinent negatives include no chest pain, dysuria, headaches, joint pain, joint swelling, nausea, neck pain, rash, sneezing, swollen glands or vomiting. She has tried NSAIDs (Mucinex, Sudaded, Dayquil, Antidiarrheal, Pepto) for the symptoms. The treatment provided mild relief.       Review of Systems   Constitutional:  Positive for appetite change (decreased), chills and fatigue. Negative for fever.   HENT:  Positive for congestion, ear pain, postnasal drip, rhinorrhea, sinus pressure, sinus pain and sore throat. Negative for sneezing.    Respiratory:  Positive for cough (states coughing up phlegm), chest tightness (states off and on), shortness of breath (states off and

## 2024-01-17 NOTE — PATIENT INSTRUCTIONS
We are committed to providing you the best care possible.    If you receive a survey after visiting one of our offices, please take time to share your experience concerning your physician office visit.  These surveys are confidential and no health information about you is shared.    We are eager to improve for you and we are counting on your feedback to help make that happen.    
daily

## 2024-01-17 NOTE — TELEPHONE ENCOUNTER
Greer on 08/03/22 to make sure the patient gets their testing done before the apt on 08/08/22 with tami. CT Assistance with ambulation/Assistance OOB with selected safe patient handling equipment/Communicate Risk of Fall with Harm to all staff/Monitor gait and stability/Reinforce activity limits and safety measures with patient and family/Sit up slowly, dangle for a short time, stand at bedside before walking/Tailored Fall Risk Interventions/Use of alarms - bed, chair and/or voice tab/Visual Cue: Yellow wristband and red socks/Bed in lowest position, wheels locked, appropriate side rails in place/Call bell, personal items and telephone in reach/Instruct patient to call for assistance before getting out of bed or chair/Non-slip footwear when patient is out of bed/[place] to call system/Physically safe environment - no spills, clutter or unnecessary equipment/Purposeful Proactive Rounding/Room/bathroom lighting operational, light cord in reach

## 2024-04-15 ENCOUNTER — HOSPITAL ENCOUNTER (OUTPATIENT)
Age: 34
Discharge: HOME | End: 2024-04-15
Payer: COMMERCIAL

## 2024-04-15 LAB
ALT SERPL-CCNC: 10 U/L (ref 10–40)
AST SERPL-CCNC: 17 IU/L (ref 15–37)

## 2024-04-15 PROCEDURE — 84450 TRANSFERASE (AST) (SGOT): CPT

## 2024-04-15 PROCEDURE — 36415 COLL VENOUS BLD VENIPUNCTURE: CPT

## 2024-04-15 PROCEDURE — 84460 ALANINE AMINO (ALT) (SGPT): CPT

## 2024-06-10 ENCOUNTER — TELEPHONE (OUTPATIENT)
Dept: BARIATRICS/WEIGHT MGMT | Age: 34
End: 2024-06-10

## 2024-06-10 DIAGNOSIS — Z98.84 HX OF BARIATRIC SURGERY: Primary | ICD-10-CM

## 2024-06-12 ENCOUNTER — OFFICE VISIT (OUTPATIENT)
Age: 34
End: 2024-06-12
Payer: COMMERCIAL

## 2024-06-12 VITALS
BODY MASS INDEX: 24.66 KG/M2 | SYSTOLIC BLOOD PRESSURE: 102 MMHG | HEART RATE: 98 BPM | DIASTOLIC BLOOD PRESSURE: 60 MMHG | WEIGHT: 148 LBS | HEIGHT: 65 IN

## 2024-06-12 DIAGNOSIS — L60.3 SPLITTING OF TOENAIL: Primary | ICD-10-CM

## 2024-06-12 DIAGNOSIS — L60.1 ONYCHOLYSIS: ICD-10-CM

## 2024-06-12 DIAGNOSIS — L60.8 NAIL DISCOLORATION: ICD-10-CM

## 2024-06-12 PROCEDURE — 99212 OFFICE O/P EST SF 10 MIN: CPT | Performed by: NURSE PRACTITIONER

## 2024-06-12 ASSESSMENT — ENCOUNTER SYMPTOMS
RHINORRHEA: 0
SORE THROAT: 0
COUGH: 0
VOMITING: 0
NAIL CHANGES: 1
DIARRHEA: 0
SHORTNESS OF BREATH: 0
COLOR CHANGE: 0
EYE PAIN: 0

## 2024-06-12 NOTE — PROGRESS NOTES
Judy Frost (:  1990) is a 34 y.o. female,Established patient, here for evaluation of the following chief complaint(s):    No chief complaint on file.      SUBJECTIVE/OBJECTIVE:  Pt presents with c/o as stated below - nail changes has been seen by podiatry and tested for fungus twice and bacterial infections - all testing was negative - started on toenails now has progressed to finger nails. Is wearing artificial nails that she is securing with bandaids because fingernails are now  from nail bed as well- condition started approx 6 weeks ago - Denies any known skin conditions - is a bariatric pt and does take many different vitamins - TSH last year was WNL     Skin Problem  This is a new problem. The current episode started more than 1 month ago (4-6 wekks aog). The problem has been gradually worsening since onset. Location: finger nails and toenails. Rash characteristics: ridges and discoloration along with separation from nail bed. She was exposed to nothing. Associated symptoms include fatigue (is bariatric patient and she struggles with fatigue) and nail changes. Pertinent negatives include no anorexia, congestion, cough, diarrhea, eye pain, facial edema, fever, joint pain, rhinorrhea, shortness of breath, sore throat or vomiting. Treatments tried: topical fungal treatment. The treatment provided no relief. Her past medical history is significant for varicella. There is no history of allergies, asthma or eczema.       Allergies   Allergen Reactions    Wool Alcohol [Lanolin] Hives     \"wool\" ; hives/ blisters        Current Outpatient Medications   Medication Sig Dispense Refill    Pseudoephedrine-APAP-DM (DAYQUIL PO) Take by mouth (Patient not taking: Reported on 2024)      pseudoephedrine (SUDAFED) 30 MG tablet Take 1 tablet by mouth every 4 hours as needed for Congestion (Patient not taking: Reported on 2024)      Pseudoeph-Doxylamine-DM-APAP (NYQUIL PO) Take by mouth (Patient not

## 2024-10-29 ENCOUNTER — TELEPHONE (OUTPATIENT)
Dept: SURGERY | Age: 34
End: 2024-10-29

## 2024-10-29 ENCOUNTER — HOSPITAL ENCOUNTER (OUTPATIENT)
Age: 34
Discharge: HOME | End: 2024-10-29

## 2024-10-29 ENCOUNTER — OFFICE VISIT (OUTPATIENT)
Dept: GASTROENTEROLOGY | Age: 34
End: 2024-10-29

## 2024-10-29 VITALS
HEIGHT: 65 IN | HEART RATE: 80 BPM | BODY MASS INDEX: 23.89 KG/M2 | SYSTOLIC BLOOD PRESSURE: 126 MMHG | DIASTOLIC BLOOD PRESSURE: 72 MMHG | WEIGHT: 143.4 LBS | RESPIRATION RATE: 17 BRPM | OXYGEN SATURATION: 100 %

## 2024-10-29 DIAGNOSIS — G89.29 CHRONIC ABDOMINAL PAIN: ICD-10-CM

## 2024-10-29 DIAGNOSIS — Z90.3 H/O GASTRIC SLEEVE: ICD-10-CM

## 2024-10-29 DIAGNOSIS — R10.9 CHRONIC ABDOMINAL PAIN: ICD-10-CM

## 2024-10-29 DIAGNOSIS — K21.00 GASTROESOPHAGEAL REFLUX DISEASE WITH ESOPHAGITIS WITHOUT HEMORRHAGE: ICD-10-CM

## 2024-10-29 DIAGNOSIS — R53.83 OTHER FATIGUE: ICD-10-CM

## 2024-10-29 DIAGNOSIS — R19.7 DIARRHEA, UNSPECIFIED TYPE: Primary | ICD-10-CM

## 2024-10-29 DIAGNOSIS — K52.9 CHRONIC DIARRHEA: ICD-10-CM

## 2024-10-29 DIAGNOSIS — R10.84 GENERALIZED ABDOMINAL PAIN: Primary | ICD-10-CM

## 2024-10-29 DIAGNOSIS — R11.2 NAUSEA AND VOMITING, UNSPECIFIED VOMITING TYPE: ICD-10-CM

## 2024-10-29 LAB
25(OH)D3 SERPL-MCNC: 31.4 NG/ML (ref 30–150)
ALBUMIN SERPL-MCNC: 4.7 G/DL (ref 3.4–5)
ALBUMIN/GLOB SERPL: 1.9 {RATIO} (ref 1.1–2.2)
ALP SERPL-CCNC: 67 U/L (ref 40–129)
ALT SERPL-CCNC: 9 U/L (ref 10–40)
ANION GAP SERPL CALCULATED.3IONS-SCNC: 11 MMOL/L (ref 9–17)
AST SERPL-CCNC: 18 U/L (ref 15–37)
BASOPHILS # BLD: 0.03 K/UL
BASOPHILS NFR BLD: 0 % (ref 0–1)
BILIRUB SERPL-MCNC: 0.3 MG/DL (ref 0–1)
BUN SERPL-MCNC: 9 MG/DL (ref 7–20)
CALCIUM SERPL-MCNC: 10.2 MG/DL (ref 8.3–10.6)
CHLORIDE SERPL-SCNC: 101 MMOL/L (ref 99–110)
CO2 SERPL-SCNC: 28 MMOL/L (ref 21–32)
CREAT SERPL-MCNC: 0.7 MG/DL (ref 0.6–1.1)
EOSINOPHIL # BLD: 0.1 K/UL
EOSINOPHILS RELATIVE PERCENT: 1 % (ref 0–3)
ERYTHROCYTE [DISTWIDTH] IN BLOOD BY AUTOMATED COUNT: 13 % (ref 11.7–14.9)
FERRITIN SERPL-MCNC: 91 NG/ML (ref 15–150)
FOLATE SERPL-MCNC: 9.7 NG/ML (ref 4.8–24.2)
GFR, ESTIMATED: >90 ML/MIN/1.73M2
GLUCOSE SERPL-MCNC: 86 MG/DL (ref 74–99)
HCT VFR BLD AUTO: 41.3 % (ref 37–47)
HGB BLD-MCNC: 13.3 G/DL (ref 12.5–16)
IMM GRANULOCYTES # BLD AUTO: 0.03 K/UL
IMM GRANULOCYTES NFR BLD: 0 %
IRON SATN MFR SERPL: 25 % (ref 15–50)
IRON SERPL-MCNC: 76 UG/DL (ref 37–145)
LYMPHOCYTES NFR BLD: 2.71 K/UL
LYMPHOCYTES RELATIVE PERCENT: 30 % (ref 24–44)
MCH RBC QN AUTO: 29.7 PG (ref 27–31)
MCHC RBC AUTO-ENTMCNC: 32.2 G/DL (ref 32–36)
MCV RBC AUTO: 92.2 FL (ref 78–100)
MONOCYTES NFR BLD: 0.58 K/UL
MONOCYTES NFR BLD: 6 % (ref 0–4)
NEUTROPHILS NFR BLD: 62 % (ref 36–66)
NEUTS SEG NFR BLD: 5.67 K/UL
PLATELET # BLD AUTO: 216 K/UL (ref 140–440)
PMV BLD AUTO: 10.5 FL (ref 7.5–11.1)
POTASSIUM SERPL-SCNC: 4.1 MMOL/L (ref 3.5–5.1)
PROT SERPL-MCNC: 7.2 G/DL (ref 6.4–8.2)
RBC # BLD AUTO: 4.48 M/UL (ref 4.2–5.4)
SODIUM SERPL-SCNC: 139 MMOL/L (ref 136–145)
TIBC SERPL-MCNC: 298 UG/DL (ref 260–445)
TSH SERPL DL<=0.05 MIU/L-ACNC: 2.16 UIU/ML (ref 0.27–4.2)
UNSATURATED IRON BINDING CAPACITY: 222 UG/DL (ref 110–370)
VIT B12 SERPL-MCNC: 552 PG/ML (ref 211–911)
WBC OTHER # BLD: 9.1 K/UL (ref 4–10.5)

## 2024-10-29 PROCEDURE — 82306 VITAMIN D 25 HYDROXY: CPT

## 2024-10-29 PROCEDURE — 82728 ASSAY OF FERRITIN: CPT

## 2024-10-29 PROCEDURE — 83550 IRON BINDING TEST: CPT

## 2024-10-29 PROCEDURE — 80053 COMPREHEN METABOLIC PANEL: CPT

## 2024-10-29 PROCEDURE — 84630 ASSAY OF ZINC: CPT

## 2024-10-29 PROCEDURE — 82746 ASSAY OF FOLIC ACID SERUM: CPT

## 2024-10-29 PROCEDURE — 83540 ASSAY OF IRON: CPT

## 2024-10-29 PROCEDURE — 84443 ASSAY THYROID STIM HORMONE: CPT

## 2024-10-29 PROCEDURE — 82607 VITAMIN B-12: CPT

## 2024-10-29 PROCEDURE — 85025 COMPLETE CBC W/AUTO DIFF WBC: CPT

## 2024-10-29 PROCEDURE — 36415 COLL VENOUS BLD VENIPUNCTURE: CPT

## 2024-10-29 PROCEDURE — 99214 OFFICE O/P EST MOD 30 MIN: CPT | Performed by: NURSE PRACTITIONER

## 2024-10-29 RX ORDER — DICYCLOMINE HYDROCHLORIDE 10 MG/1
10 CAPSULE ORAL 4 TIMES DAILY PRN
Qty: 120 CAPSULE | Refills: 0 | Status: SHIPPED | OUTPATIENT
Start: 2024-10-29

## 2024-10-29 ASSESSMENT — ENCOUNTER SYMPTOMS
ABDOMINAL PAIN: 0
CONSTIPATION: 0
SHORTNESS OF BREATH: 0
DIARRHEA: 1
COLOR CHANGE: 0
VOMITING: 0
BLOOD IN STOOL: 0
EYE PAIN: 0
COUGH: 0
NAUSEA: 1
PHOTOPHOBIA: 0
BACK PAIN: 0

## 2024-10-29 NOTE — PROGRESS NOTES
likely irritable bowel syndrome.  She has constipation with taking anti-diarrheals.  She did not complete stool studies will reorder to check fecal calprotectin, pancreatic elastase, GI pathogens.  Her colonoscopy showed no evidence of microscopic colitis.  Her celiac panel was normal.  Recommend good bowel regimen with increase in fruit, fiber and fluids.  Avoid foods that worsen.  No melena or hematochezia.  Prior history of pancreatitis.  4.. Status post laparoscopic sleeve gastrectomy in November 2021 recommend follow with Dr. Parnell.  She has lost over 100 pounds since her surgery but recalled ongoing nausea since that time and currently having hot flashes.  Recommend following dietician recommendations, taking vitamin supplements.  She did not complete lab work ordered by his office.  Will check thyroid and encouraged to follow-up with OBGYN.  5.  The patient was encouraged to follow-up in 2 months.    Total time: 30 minutes with chart review, patient education, etc.

## 2024-10-29 NOTE — TELEPHONE ENCOUNTER
Spoke with Judy regarding APPT's , pain and diarrhea. At the point of not being able to control bowels at times, and pain is severe. Per Dr. Parnell schedule JANAE and US then follow up in the office.

## 2024-10-31 ENCOUNTER — HOSPITAL ENCOUNTER (OUTPATIENT)
Dept: ULTRASOUND IMAGING | Age: 34
Discharge: HOME | End: 2024-10-31

## 2024-10-31 DIAGNOSIS — R10.84 GENERALIZED ABDOMINAL PAIN: ICD-10-CM

## 2024-10-31 DIAGNOSIS — K52.9 CHRONIC DIARRHEA: ICD-10-CM

## 2024-10-31 LAB — ZINC SERPL-MCNC: 87.6 UG/DL (ref 60–120)

## 2024-10-31 PROCEDURE — 76705 ECHO EXAM OF ABDOMEN: CPT

## 2024-11-11 ENCOUNTER — TELEPHONE (OUTPATIENT)
Dept: SURGERY | Age: 34
End: 2024-11-11

## 2024-11-11 NOTE — TELEPHONE ENCOUNTER
LVM for Judy to call in regards to the HIDA Scan. US was completed and resulted but the HIDA scan was not scheduled. Spoke with scheduling Judy is scheduled for 11/18/24 at [facility]  arrival at 8:00am-NPO 6 hrs prior, 48 hrs prior no pain medication to be taken, and night prior a fatty meal is to be eaten.

## 2024-11-18 ENCOUNTER — HOSPITAL ENCOUNTER (OUTPATIENT)
Dept: NUCLEAR MEDICINE | Age: 34
Discharge: HOME | End: 2024-11-18
Attending: SURGERY

## 2024-11-18 VITALS — WEIGHT: 134 LBS | BODY MASS INDEX: 22.3 KG/M2

## 2024-11-18 DIAGNOSIS — K52.9 CHRONIC DIARRHEA: ICD-10-CM

## 2024-11-18 DIAGNOSIS — R10.84 GENERALIZED ABDOMINAL PAIN: ICD-10-CM

## 2024-11-18 PROCEDURE — 78227 HEPATOBIL SYST IMAGE W/DRUG: CPT

## 2024-11-18 PROCEDURE — 3430000000 HC RX DIAGNOSTIC RADIOPHARMACEUTICAL: Performed by: SURGERY

## 2024-11-18 PROCEDURE — 2580000003 HC RX 258: Performed by: SURGERY

## 2024-11-18 PROCEDURE — 6360000002 HC RX W HCPCS: Performed by: SURGERY

## 2024-11-18 PROCEDURE — A9537 TC99M MEBROFENIN: HCPCS | Performed by: SURGERY

## 2024-11-18 RX ADMIN — Medication 5 MILLICURIE: at 08:44

## 2024-11-18 RX ADMIN — SODIUM CHLORIDE 1.22 MCG: 9 INJECTION, SOLUTION INTRAVENOUS at 09:47

## 2024-11-21 RX ORDER — DICYCLOMINE HYDROCHLORIDE 10 MG/1
10 CAPSULE ORAL 4 TIMES DAILY PRN
Qty: 120 CAPSULE | Refills: 0 | Status: SHIPPED | OUTPATIENT
Start: 2024-11-21

## 2024-12-05 ENCOUNTER — OFFICE VISIT (OUTPATIENT)
Dept: BARIATRICS/WEIGHT MGMT | Age: 34
End: 2024-12-05

## 2024-12-05 VITALS
SYSTOLIC BLOOD PRESSURE: 110 MMHG | BODY MASS INDEX: 23.44 KG/M2 | WEIGHT: 140.7 LBS | DIASTOLIC BLOOD PRESSURE: 66 MMHG | OXYGEN SATURATION: 99 % | HEIGHT: 65 IN | HEART RATE: 71 BPM

## 2024-12-05 DIAGNOSIS — R10.11 RUQ PAIN: Primary | ICD-10-CM

## 2024-12-05 PROCEDURE — 99214 OFFICE O/P EST MOD 30 MIN: CPT | Performed by: SURGERY

## 2024-12-05 RX ORDER — SODIUM CHLORIDE, SODIUM LACTATE, POTASSIUM CHLORIDE, CALCIUM CHLORIDE 600; 310; 30; 20 MG/100ML; MG/100ML; MG/100ML; MG/100ML
INJECTION, SOLUTION INTRAVENOUS CONTINUOUS
OUTPATIENT
Start: 2024-12-05

## 2024-12-05 RX ORDER — SODIUM CHLORIDE 9 MG/ML
INJECTION, SOLUTION INTRAVENOUS PRN
OUTPATIENT
Start: 2024-12-05

## 2024-12-05 RX ORDER — SODIUM CHLORIDE 0.9 % (FLUSH) 0.9 %
5-40 SYRINGE (ML) INJECTION EVERY 12 HOURS SCHEDULED
OUTPATIENT
Start: 2024-12-05

## 2024-12-05 RX ORDER — SODIUM CHLORIDE 0.9 % (FLUSH) 0.9 %
5-40 SYRINGE (ML) INJECTION PRN
OUTPATIENT
Start: 2024-12-05

## 2024-12-05 ASSESSMENT — ENCOUNTER SYMPTOMS
VOMITING: 1
DIARRHEA: 1
ABDOMINAL PAIN: 1

## 2024-12-05 ASSESSMENT — PATIENT HEALTH QUESTIONNAIRE - PHQ9
SUM OF ALL RESPONSES TO PHQ QUESTIONS 1-9: 0
2. FEELING DOWN, DEPRESSED OR HOPELESS: NOT AT ALL
SUM OF ALL RESPONSES TO PHQ9 QUESTIONS 1 & 2: 0
SUM OF ALL RESPONSES TO PHQ QUESTIONS 1-9: 0
1. LITTLE INTEREST OR PLEASURE IN DOING THINGS: NOT AT ALL

## 2024-12-05 NOTE — PROGRESS NOTES
Chief Complaint   Patient presents with    Follow-up     FU US Results 10/31/24 , HiDA scan 24         SUBJECTIVE:  HPI: Patient is here with complaints of:    RUQ pain.    Intermittent.     Also with diarrhea. Being seen by GI.    Had U/S and JANAE.    I have reviewed the patient's(pertinent information to this visit) medical history, family history(scanned in  the [employer] under \"patient questioner\"), social history and review of systems with the patient today in the office.            Past Surgical History:   Procedure Laterality Date    COLONOSCOPY N/A 3/28/2023    COLONOSCOPY POLYPECTOMY SNARE/COLD BIOPSY performed by Alvina Cee MD at [facility] OR    ENDOSCOPY, COLON, DIAGNOSTIC      HEMORRHOID SURGERY      x3- last time 2019    SLEEVE GASTRECTOMY N/A 2021    GASTRECTOMY SLEEVE LAPAROSCOPIC ROBOTIC performed by Jose Antonio Parnell II, MD at [facility] OR    TUBAL LIGATION  2015    UPPER GASTROINTESTINAL ENDOSCOPY N/A 2021    EGD BIOPSY performed by Alsesandro Alvarado MD at [facility] ENDOSCOPY    UPPER GASTROINTESTINAL ENDOSCOPY N/A 3/28/2023    EGD BIOPSY performed by Alvina Cee MD at [facility] OR    VAGINA SURGERY      reconstruction between vag/anal area from childbirth    WISDOM TOOTH EXTRACTION N/A      Past Medical History:   Diagnosis Date    Depression     Diabetes mellitus (HCC)     type 2-diet controlled.\"had gestational diabetes and my hbgA1c was 6.1- off medications for the past 2 yrs now just diet controlled    GERD (gastroesophageal reflux disease)     Group B streptococcal infection during pregnancy      (normal spontaneous vaginal delivery) 7-2-15    delivered in ambulance 12 minutes    Obesity, unspecified     Pancreatitis chronic     Problems with hearing     dr nunez; only due to when she had a cold     Family History   Adopted: Yes   Problem Relation Age of Onset    Breast Cancer Mother     Ovarian Cancer Mother     No Known Problems Father     Other Sister         autistic (1

## 2024-12-06 ENCOUNTER — ANESTHESIA EVENT (OUTPATIENT)
Dept: OPERATING ROOM | Age: 34
End: 2024-12-06

## 2024-12-06 ENCOUNTER — TELEPHONE (OUTPATIENT)
Dept: BARIATRICS/WEIGHT MGMT | Age: 34
End: 2024-12-06

## 2024-12-06 ENCOUNTER — PREP FOR PROCEDURE (OUTPATIENT)
Dept: BARIATRICS/WEIGHT MGMT | Age: 34
End: 2024-12-06

## 2024-12-06 DIAGNOSIS — R10.11 RUQ PAIN: ICD-10-CM

## 2024-12-06 NOTE — ANESTHESIA PRE PROCEDURE
Department of Anesthesiology  Preprocedure Note       Name:  Judy Frost   Age:  34 y.o.  :  1990                                          MRN:  [id number]         Date:  2024      Surgeon: Surgeon(s):  Jose Antonio Parnell II, MD    Procedure: Procedure(s):  ROBOTIC CHOLECYSTECTOMY POSSIBLE LIVER BIOPSY    Medications prior to admission:   Prior to Admission medications    Medication Sig Start Date End Date Taking? Authorizing Provider   dicyclomine (BENTYL) 10 MG capsule Take 1 capsule by mouth 4 times daily as needed (as needed for abdominal cramping pain) 24   Chrystal Calderon, APRN - CNP   omeprazole (PRILOSEC) 40 MG delayed release capsule Take 1 capsule by mouth every morning (before breakfast)  Patient not taking: Reported on 2024   Alessandro Alvarado MD       Current medications:    No current facility-administered medications for this encounter.     Current Outpatient Medications   Medication Sig Dispense Refill   • dicyclomine (BENTYL) 10 MG capsule Take 1 capsule by mouth 4 times daily as needed (as needed for abdominal cramping pain) 120 capsule 0   • omeprazole (PRILOSEC) 40 MG delayed release capsule Take 1 capsule by mouth every morning (before breakfast) (Patient not taking: Reported on 2024) 30 capsule 2       Allergies:    Allergies   Allergen Reactions   • Wool Alcohol [Lanolin] Hives     \"wool\" ; hives/ blisters       Problem List:    Patient Active Problem List   Diagnosis Code   • Weight gain R63.5   • Pre-diabetes R73.03   • Lesion of buccal mucosa K13.70   • Tobacco use Z72.0   • Obesity E66.9   • Moderate episode of recurrent major depressive disorder (HCC) F33.1   • Anxiety F41.9   • Positive depression screening Z13.31   • Generalized body aches R52   • Viral URI J06.9   • Morbid obesity with BMI of 40.0-44.9, adult E66.01, Z68.41   • Grade III hemorrhoids K64.2   • Morbid obesity E66.01   • Drooping eyelid H02.409   • Facial droop R29.810   • Acute

## 2024-12-06 NOTE — TELEPHONE ENCOUNTER
SPOKE TO  Judy Frost REGARDING SURGERY (ROBOTIC CARSON) SCHEDULED @ [facility]. NOTIFIED OF DATES, TIMES AND LOCATION    PHONE ASSESSMENT   SURGERY - 12/9 @ 11  P/O - 12/19 @  11:30    NPO AFTER MIDNIGHT    HOLD BLOOD THINNERS - NA

## 2024-12-06 NOTE — PROGRESS NOTES
.Surgery @ [facility] on 12/9/24 at 1100, arrival 0900    NOTHING TO EAT OR DRINK AFTER MIDNIGHT DAY OF SURGERY    1. Enter thru the hospital main entrance on day of surgery, check in at the Information Desk. If you arrive prior to 6:00am, enter thru the ER entrance.    2. Follow the directions as prescribed by the doctor for your procedure and medications.         Morning of surgery take:  Bentyl with sips of water         Stop vitamins, supplements and NSAIDS:  NOW    3. Check with your Doctor regarding stopping blood thinners and follow their instructions.    4. Do not smoke, vape or use chewing tobacco morning of surgery. Do not drink any alcoholic beverages 24 hours prior to surgery.       This includes NA Beer. No street drugs 7 days prior to surgery.    5. If you have dentures, contacts of glasses they will be removed before going to the OR; please bring a case.    6. Please bring picture ID, insurance card, paperwork from the doctor’s office (H & P, Consent, & card for implantable devices).    7. Take a shower with an antibacterial soap the night before surgery and the morning of surgery. Do not put anything on your skin      After your morning shower.    8. You will need a responsible adult to drive you home and check on you after surgery.

## 2024-12-09 ENCOUNTER — ANESTHESIA (OUTPATIENT)
Dept: OPERATING ROOM | Age: 34
End: 2024-12-09

## 2024-12-09 ENCOUNTER — HOSPITAL ENCOUNTER (EMERGENCY)
Age: 34
Discharge: LEFT BEFORE DISCHARGE | End: 2024-12-09

## 2024-12-09 ENCOUNTER — HOSPITAL ENCOUNTER (OUTPATIENT)
Age: 34
Setting detail: OUTPATIENT SURGERY
Discharge: HOME | End: 2024-12-09
Attending: SURGERY | Admitting: SURGERY

## 2024-12-09 VITALS
TEMPERATURE: 98.4 F | DIASTOLIC BLOOD PRESSURE: 65 MMHG | OXYGEN SATURATION: 100 % | HEART RATE: 72 BPM | SYSTOLIC BLOOD PRESSURE: 151 MMHG

## 2024-12-09 VITALS
OXYGEN SATURATION: 100 % | TEMPERATURE: 97.5 F | SYSTOLIC BLOOD PRESSURE: 127 MMHG | HEART RATE: 57 BPM | DIASTOLIC BLOOD PRESSURE: 71 MMHG | RESPIRATION RATE: 16 BRPM | WEIGHT: 140 LBS | BODY MASS INDEX: 23.32 KG/M2 | HEIGHT: 65 IN

## 2024-12-09 DIAGNOSIS — R10.11 RUQ PAIN: ICD-10-CM

## 2024-12-09 DIAGNOSIS — Z90.49 S/P LAPAROSCOPIC CHOLECYSTECTOMY: Primary | ICD-10-CM

## 2024-12-09 LAB
GLUCOSE BLD-MCNC: 87 MG/DL (ref 74–99)
HCG, PREGNANCY URINE (POC): NEGATIVE

## 2024-12-09 PROCEDURE — 6360000002 HC RX W HCPCS: Performed by: SURGERY

## 2024-12-09 PROCEDURE — 3700000000 HC ANESTHESIA ATTENDED CARE: Performed by: SURGERY

## 2024-12-09 PROCEDURE — 3600000019 HC SURGERY ROBOT ADDTL 15MIN: Performed by: SURGERY

## 2024-12-09 PROCEDURE — 82962 GLUCOSE BLOOD TEST: CPT

## 2024-12-09 PROCEDURE — 6360000002 HC RX W HCPCS: Performed by: ANESTHESIOLOGY

## 2024-12-09 PROCEDURE — 47562 LAPAROSCOPIC CHOLECYSTECTOMY: CPT | Performed by: SURGERY

## 2024-12-09 PROCEDURE — 2580000003 HC RX 258: Performed by: SURGERY

## 2024-12-09 PROCEDURE — 2709999900 HC NON-CHARGEABLE SUPPLY: Performed by: SURGERY

## 2024-12-09 PROCEDURE — 6360000002 HC RX W HCPCS: Performed by: NURSE ANESTHETIST, CERTIFIED REGISTERED

## 2024-12-09 PROCEDURE — 88304 TISSUE EXAM BY PATHOLOGIST: CPT | Performed by: PATHOLOGY

## 2024-12-09 PROCEDURE — C1889 IMPLANT/INSERT DEVICE, NOC: HCPCS | Performed by: SURGERY

## 2024-12-09 PROCEDURE — 81025 URINE PREGNANCY TEST: CPT

## 2024-12-09 PROCEDURE — 2580000003 HC RX 258: Performed by: NURSE ANESTHETIST, CERTIFIED REGISTERED

## 2024-12-09 PROCEDURE — 7100000010 HC PHASE II RECOVERY - FIRST 15 MIN: Performed by: SURGERY

## 2024-12-09 PROCEDURE — 7100000000 HC PACU RECOVERY - FIRST 15 MIN: Performed by: SURGERY

## 2024-12-09 PROCEDURE — 3600000009 HC SURGERY ROBOT BASE: Performed by: SURGERY

## 2024-12-09 PROCEDURE — 6370000000 HC RX 637 (ALT 250 FOR IP): Performed by: ANESTHESIOLOGY

## 2024-12-09 PROCEDURE — S2900 ROBOTIC SURGICAL SYSTEM: HCPCS | Performed by: SURGERY

## 2024-12-09 PROCEDURE — 2500000003 HC RX 250 WO HCPCS: Performed by: SURGERY

## 2024-12-09 PROCEDURE — 7100000001 HC PACU RECOVERY - ADDTL 15 MIN: Performed by: SURGERY

## 2024-12-09 PROCEDURE — 2500000003 HC RX 250 WO HCPCS: Performed by: NURSE ANESTHETIST, CERTIFIED REGISTERED

## 2024-12-09 PROCEDURE — 7100000011 HC PHASE II RECOVERY - ADDTL 15 MIN: Performed by: SURGERY

## 2024-12-09 PROCEDURE — 3700000001 HC ADD 15 MINUTES (ANESTHESIA): Performed by: SURGERY

## 2024-12-09 DEVICE — CLIP INT L POLYMER LOK LIG HEM O LOK (6EA/PK): Type: IMPLANTABLE DEVICE | Site: ABDOMEN | Status: FUNCTIONAL

## 2024-12-09 RX ORDER — FENTANYL CITRATE 50 UG/ML
INJECTION, SOLUTION INTRAMUSCULAR; INTRAVENOUS
Status: DISCONTINUED | OUTPATIENT
Start: 2024-12-09 | End: 2024-12-09 | Stop reason: SDUPTHER

## 2024-12-09 RX ORDER — ONDANSETRON 2 MG/ML
INJECTION INTRAMUSCULAR; INTRAVENOUS
Status: DISCONTINUED | OUTPATIENT
Start: 2024-12-09 | End: 2024-12-09 | Stop reason: SDUPTHER

## 2024-12-09 RX ORDER — HYDROCODONE BITARTRATE AND ACETAMINOPHEN 5; 325 MG/1; MG/1
1 TABLET ORAL EVERY 6 HOURS PRN
Qty: 12 TABLET | Refills: 0 | Status: SHIPPED | OUTPATIENT
Start: 2024-12-09 | End: 2024-12-12

## 2024-12-09 RX ORDER — HYDRALAZINE HYDROCHLORIDE 20 MG/ML
10 INJECTION INTRAMUSCULAR; INTRAVENOUS
Status: DISCONTINUED | OUTPATIENT
Start: 2024-12-09 | End: 2024-12-09 | Stop reason: HOSPADM

## 2024-12-09 RX ORDER — KETOROLAC TROMETHAMINE 30 MG/ML
INJECTION, SOLUTION INTRAMUSCULAR; INTRAVENOUS
Status: DISCONTINUED | OUTPATIENT
Start: 2024-12-09 | End: 2024-12-09 | Stop reason: SDUPTHER

## 2024-12-09 RX ORDER — SODIUM CHLORIDE 0.9 % (FLUSH) 0.9 %
5-40 SYRINGE (ML) INJECTION EVERY 12 HOURS SCHEDULED
Status: DISCONTINUED | OUTPATIENT
Start: 2024-12-09 | End: 2024-12-09 | Stop reason: HOSPADM

## 2024-12-09 RX ORDER — LIDOCAINE HYDROCHLORIDE 20 MG/ML
INJECTION, SOLUTION EPIDURAL; INFILTRATION; INTRACAUDAL; PERINEURAL
Status: DISCONTINUED | OUTPATIENT
Start: 2024-12-09 | End: 2024-12-09 | Stop reason: SDUPTHER

## 2024-12-09 RX ORDER — ROCURONIUM BROMIDE 10 MG/ML
INJECTION, SOLUTION INTRAVENOUS
Status: DISCONTINUED | OUTPATIENT
Start: 2024-12-09 | End: 2024-12-09 | Stop reason: SDUPTHER

## 2024-12-09 RX ORDER — DEXAMETHASONE SODIUM PHOSPHATE 4 MG/ML
INJECTION, SOLUTION INTRA-ARTICULAR; INTRALESIONAL; INTRAMUSCULAR; INTRAVENOUS; SOFT TISSUE
Status: DISCONTINUED | OUTPATIENT
Start: 2024-12-09 | End: 2024-12-09 | Stop reason: SDUPTHER

## 2024-12-09 RX ORDER — ONDANSETRON 4 MG/1
4 TABLET, FILM COATED ORAL DAILY PRN
Qty: 20 TABLET | Refills: 3 | Status: SHIPPED | OUTPATIENT
Start: 2024-12-09

## 2024-12-09 RX ORDER — MEPERIDINE HYDROCHLORIDE 25 MG/ML
12.5 INJECTION INTRAMUSCULAR; INTRAVENOUS; SUBCUTANEOUS EVERY 5 MIN PRN
Status: DISCONTINUED | OUTPATIENT
Start: 2024-12-09 | End: 2024-12-09 | Stop reason: HOSPADM

## 2024-12-09 RX ORDER — FENTANYL CITRATE 50 UG/ML
50 INJECTION, SOLUTION INTRAMUSCULAR; INTRAVENOUS EVERY 5 MIN PRN
Status: DISCONTINUED | OUTPATIENT
Start: 2024-12-09 | End: 2024-12-09 | Stop reason: HOSPADM

## 2024-12-09 RX ORDER — PROPOFOL 10 MG/ML
INJECTION, EMULSION INTRAVENOUS
Status: DISCONTINUED | OUTPATIENT
Start: 2024-12-09 | End: 2024-12-09 | Stop reason: SDUPTHER

## 2024-12-09 RX ORDER — DROPERIDOL 2.5 MG/ML
0.62 INJECTION, SOLUTION INTRAMUSCULAR; INTRAVENOUS
Status: DISCONTINUED | OUTPATIENT
Start: 2024-12-09 | End: 2024-12-09 | Stop reason: HOSPADM

## 2024-12-09 RX ORDER — BUPIVACAINE HYDROCHLORIDE 5 MG/ML
INJECTION, SOLUTION EPIDURAL; INTRACAUDAL
Status: COMPLETED | OUTPATIENT
Start: 2024-12-09 | End: 2024-12-09

## 2024-12-09 RX ORDER — OXYCODONE HYDROCHLORIDE 5 MG/1
5 TABLET ORAL
Status: COMPLETED | OUTPATIENT
Start: 2024-12-09 | End: 2024-12-09

## 2024-12-09 RX ORDER — ONDANSETRON 2 MG/ML
4 INJECTION INTRAMUSCULAR; INTRAVENOUS
Status: COMPLETED | OUTPATIENT
Start: 2024-12-09 | End: 2024-12-09

## 2024-12-09 RX ORDER — SODIUM CHLORIDE, SODIUM LACTATE, POTASSIUM CHLORIDE, CALCIUM CHLORIDE 600; 310; 30; 20 MG/100ML; MG/100ML; MG/100ML; MG/100ML
INJECTION, SOLUTION INTRAVENOUS
Status: DISCONTINUED | OUTPATIENT
Start: 2024-12-09 | End: 2024-12-09 | Stop reason: SDUPTHER

## 2024-12-09 RX ORDER — SODIUM CHLORIDE 9 MG/ML
INJECTION, SOLUTION INTRAVENOUS PRN
Status: DISCONTINUED | OUTPATIENT
Start: 2024-12-09 | End: 2024-12-09 | Stop reason: HOSPADM

## 2024-12-09 RX ORDER — SODIUM CHLORIDE 0.9 % (FLUSH) 0.9 %
5-40 SYRINGE (ML) INJECTION PRN
Status: DISCONTINUED | OUTPATIENT
Start: 2024-12-09 | End: 2024-12-09 | Stop reason: HOSPADM

## 2024-12-09 RX ORDER — NALOXONE HYDROCHLORIDE 0.4 MG/ML
INJECTION, SOLUTION INTRAMUSCULAR; INTRAVENOUS; SUBCUTANEOUS PRN
Status: DISCONTINUED | OUTPATIENT
Start: 2024-12-09 | End: 2024-12-09 | Stop reason: HOSPADM

## 2024-12-09 RX ORDER — INDOCYANINE GREEN AND WATER 25 MG
1.25 KIT INJECTION ONCE
Status: COMPLETED | OUTPATIENT
Start: 2024-12-09 | End: 2024-12-09

## 2024-12-09 RX ORDER — LABETALOL HYDROCHLORIDE 5 MG/ML
10 INJECTION, SOLUTION INTRAVENOUS
Status: DISCONTINUED | OUTPATIENT
Start: 2024-12-09 | End: 2024-12-09 | Stop reason: HOSPADM

## 2024-12-09 RX ORDER — SODIUM CHLORIDE, SODIUM LACTATE, POTASSIUM CHLORIDE, CALCIUM CHLORIDE 600; 310; 30; 20 MG/100ML; MG/100ML; MG/100ML; MG/100ML
INJECTION, SOLUTION INTRAVENOUS CONTINUOUS
Status: DISCONTINUED | OUTPATIENT
Start: 2024-12-09 | End: 2024-12-09 | Stop reason: HOSPADM

## 2024-12-09 RX ORDER — AMOXICILLIN 250 MG
2 CAPSULE ORAL DAILY
Qty: 28 TABLET | Refills: 0 | Status: SHIPPED | OUTPATIENT
Start: 2024-12-09 | End: 2024-12-23

## 2024-12-09 RX ADMIN — CEFAZOLIN 2000 MG: 2 INJECTION, POWDER, FOR SOLUTION INTRAMUSCULAR; INTRAVENOUS at 10:08

## 2024-12-09 RX ADMIN — SUGAMMADEX 200 MG: 100 INJECTION, SOLUTION INTRAVENOUS at 10:42

## 2024-12-09 RX ADMIN — FENTANYL CITRATE 50 MCG: 50 INJECTION, SOLUTION INTRAMUSCULAR; INTRAVENOUS at 11:07

## 2024-12-09 RX ADMIN — ONDANSETRON 4 MG: 2 INJECTION INTRAMUSCULAR; INTRAVENOUS at 10:40

## 2024-12-09 RX ADMIN — INDOCYANINE GREEN AND WATER 1.25 MG: KIT at 08:55

## 2024-12-09 RX ADMIN — KETOROLAC TROMETHAMINE 30 MG: 30 INJECTION, SOLUTION INTRAMUSCULAR; INTRAVENOUS at 10:38

## 2024-12-09 RX ADMIN — FENTANYL CITRATE 50 MCG: 50 INJECTION, SOLUTION INTRAMUSCULAR; INTRAVENOUS at 10:57

## 2024-12-09 RX ADMIN — ROCURONIUM BROMIDE 50 MG: 10 INJECTION, SOLUTION INTRAVENOUS at 10:06

## 2024-12-09 RX ADMIN — SODIUM CHLORIDE, POTASSIUM CHLORIDE, SODIUM LACTATE AND CALCIUM CHLORIDE: 600; 310; 30; 20 INJECTION, SOLUTION INTRAVENOUS at 10:03

## 2024-12-09 RX ADMIN — ONDANSETRON 4 MG: 2 INJECTION INTRAMUSCULAR; INTRAVENOUS at 12:52

## 2024-12-09 RX ADMIN — FENTANYL CITRATE 50 MCG: 50 INJECTION, SOLUTION INTRAMUSCULAR; INTRAVENOUS at 11:27

## 2024-12-09 RX ADMIN — LIDOCAINE HYDROCHLORIDE 100 MG: 20 INJECTION, SOLUTION EPIDURAL; INFILTRATION; INTRACAUDAL; PERINEURAL at 10:06

## 2024-12-09 RX ADMIN — HYDROMORPHONE HYDROCHLORIDE 0.5 MG: 1 INJECTION, SOLUTION INTRAMUSCULAR; INTRAVENOUS; SUBCUTANEOUS at 11:02

## 2024-12-09 RX ADMIN — HYDROMORPHONE HYDROCHLORIDE 0.5 MG: 1 INJECTION, SOLUTION INTRAMUSCULAR; INTRAVENOUS; SUBCUTANEOUS at 11:15

## 2024-12-09 RX ADMIN — DEXAMETHASONE SODIUM PHOSPHATE 4 MG: 4 INJECTION, SOLUTION INTRAMUSCULAR; INTRAVENOUS at 10:20

## 2024-12-09 RX ADMIN — OXYCODONE HYDROCHLORIDE 5 MG: 5 TABLET ORAL at 11:59

## 2024-12-09 RX ADMIN — PROPOFOL 150 MG: 10 INJECTION, EMULSION INTRAVENOUS at 10:06

## 2024-12-09 RX ADMIN — FENTANYL CITRATE 100 MCG: 50 INJECTION, SOLUTION INTRAMUSCULAR; INTRAVENOUS at 10:06

## 2024-12-09 ASSESSMENT — PAIN DESCRIPTION - ORIENTATION
ORIENTATION: RIGHT;ANTERIOR
ORIENTATION: MID
ORIENTATION: MID;LOWER
ORIENTATION: RIGHT
ORIENTATION: RIGHT
ORIENTATION: RIGHT;ANTERIOR
ORIENTATION: MID;LOWER

## 2024-12-09 ASSESSMENT — PAIN SCALES - GENERAL
PAINLEVEL_OUTOF10: 10
PAINLEVEL_OUTOF10: 6
PAINLEVEL_OUTOF10: 10
PAINLEVEL_OUTOF10: 1
PAINLEVEL_OUTOF10: 6
PAINLEVEL_OUTOF10: 10

## 2024-12-09 ASSESSMENT — PAIN - FUNCTIONAL ASSESSMENT
PAIN_FUNCTIONAL_ASSESSMENT: PREVENTS OR INTERFERES SOME ACTIVE ACTIVITIES AND ADLS
PAIN_FUNCTIONAL_ASSESSMENT: PREVENTS OR INTERFERES SOME ACTIVE ACTIVITIES AND ADLS
PAIN_FUNCTIONAL_ASSESSMENT: 0-10
PAIN_FUNCTIONAL_ASSESSMENT: PREVENTS OR INTERFERES SOME ACTIVE ACTIVITIES AND ADLS
PAIN_FUNCTIONAL_ASSESSMENT: 0-10
PAIN_FUNCTIONAL_ASSESSMENT: 0-10
PAIN_FUNCTIONAL_ASSESSMENT: ACTIVITIES ARE NOT PREVENTED
PAIN_FUNCTIONAL_ASSESSMENT: 0-10
PAIN_FUNCTIONAL_ASSESSMENT: PREVENTS OR INTERFERES SOME ACTIVE ACTIVITIES AND ADLS

## 2024-12-09 ASSESSMENT — PAIN DESCRIPTION - LOCATION
LOCATION: ABDOMEN
LOCATION: ABDOMEN
LOCATION: BACK
LOCATION: ABDOMEN
LOCATION: BACK

## 2024-12-09 ASSESSMENT — PAIN DESCRIPTION - DESCRIPTORS
DESCRIPTORS: DISCOMFORT;CRAMPING;ACHING
DESCRIPTORS: ACHING
DESCRIPTORS: ACHING
DESCRIPTORS: DISCOMFORT;ACHING;CRAMPING
DESCRIPTORS: ACHING
DESCRIPTORS: ACHING;DISCOMFORT;CRAMPING
DESCRIPTORS: ACHING
DESCRIPTORS: ACHING;DISCOMFORT;CRAMPING
DESCRIPTORS: ACHING;DISCOMFORT;CRAMPING

## 2024-12-09 ASSESSMENT — PAIN DESCRIPTION - PAIN TYPE: TYPE: SURGICAL PAIN

## 2024-12-09 ASSESSMENT — PAIN SCALES - WONG BAKER: WONGBAKER_NUMERICALRESPONSE: NO HURT

## 2024-12-09 ASSESSMENT — PAIN DESCRIPTION - FREQUENCY: FREQUENCY: CONTINUOUS

## 2024-12-09 ASSESSMENT — LIFESTYLE VARIABLES: SMOKING_STATUS: 1

## 2024-12-09 ASSESSMENT — PAIN DESCRIPTION - ONSET: ONSET: ON-GOING

## 2024-12-09 NOTE — OP NOTE
Operative Report    Patient Information:    Judy Frost  [id number]  34 y.o.  1990    Indications: The above patient presented with symptomatic gallbladder disease and was worked up appropriately--including imaging, laboratory data, and history and physical exam. After discussion with the patient, the decision was made to undergo robotic-assisted laparoscopic, possible open, cholecystectomy with the possibility of intraoperative cholangiogram and/or Firefly fluorescence imaging using indocyanine green (ICG) dye.    Pre-Operative Diagnosis: Persistent right upper quadrant pain    Post-Operative Diagnosis: Same as above and contracted gallbladder    Procedure: Robotic-assisted laparoscopic cholecystectomy with ICG fluorescence imaging    Surgeon: Jose Antonio Parnell MD, FACS, FASBMS    Assistant: Elodia Lovelace CNP. The skilled assistance of the CNP was necessary for the successful completion of this case. She was essential for the proper positioning, manipulation of instruments, proper exposure, manipulation of tissue, and wound closure.    Anesthesia: General endotracheal    ASA: Per anesthesia    Findings: Consistent with post-operative diagnosis    IVF: 600 mL    Estimated Blood Loss: Less than 15 mL    Specimen(s): Gallbladder and contents    Complications:  None apparent    Condition: Stable    Disposition: PACU    Operative Details: The patient was met in the pre-operative holding area. An informed consent was obtained after discussing the risks, benefits, complications, treatment options, and expected outcomes.    The risks discussed included: adverse reaction to medication(s), pulmonary aspiration, perforation of viscus, bleeding, recurrent infection, finding a normal gallbladder, the need for additional procedures, failure to diagnose a condition, the possibility of converting to an open procedure, damage to nearby structures (specifically biliary structures), and creating a complication requiring

## 2024-12-09 NOTE — H&P
History and Physical Update    Original H&P done in office on 12/5/24 (less than 30 days ago).    Pt reports the following changes in health since being seen last:    None    Vitals:    12/09/24 0852   BP: 116/68   Pulse: 76   Resp: 16   Temp: 97 °F (36.1 °C)   SpO2: 99%       Alert and oriented x 3, no apparent distress at rest  Atraumatic, normocephalic.  EOMI.  Breathing unlabored.  RRR.  Soft, non-tender, non-distended.  Moves all extremities.   Warm, dry.         33 y/o F with RUQ pain, nausea, diarrhea    -Consent obtained in office.  -Abx ordered in office.  -Reviewed expected pre-operative, operative, and post-operative courses.  -Answered questions to patient's satisfaction.   -Reviewed risks, benefits, alternative to procedure.   -Proceed as scheduled.  -Reviewed with pt again that all of her symptoms may not be related to GB but she wants to proceed with surgery. Recommend continued f/u with GI to obtain stool studies previously ordered.         Jose Antonio Parnell II, MD

## 2024-12-09 NOTE — ANESTHESIA POSTPROCEDURE EVALUATION
Department of Anesthesiology  Postprocedure Note    Patient: Judy Frost  MRN: [id number]  YOB: 1990  Date of evaluation: 12/9/2024    Procedure Summary       Date: 12/09/24 Room / Location: [address]    Anesthesia Start: 1003 Anesthesia Stop: 1056    Procedure: ROBOTIC CHOLECYSTECTOMY POSSIBLE LIVER BIOPSY (Abdomen) Diagnosis:       RUQ pain      (RUQ pain [R10.11])    Surgeons: Jose Antonio Parnell II, MD Responsible Provider: Dick Armstrong MD    Anesthesia Type: general ASA Status: 2            Anesthesia Type: No value filed.    Dejah Phase I: Dejah Score: 10    Dejah Phase II: Dejah Score: 10    Anesthesia Post Evaluation    Patient location during evaluation: PACU  Patient participation: complete - patient participated  Level of consciousness: awake  Pain score: 1  Airway patency: patent  Nausea & Vomiting: no nausea and no vomiting  Cardiovascular status: hemodynamically stable  Respiratory status: nasal cannula  Hydration status: euvolemic  Multimodal analgesia pain management approach    No notable events documented.

## 2024-12-09 NOTE — DISCHARGE INSTRUCTIONS
[facility]  [phone]    Do not drive, work around machines or use equipment.  Do not drink any alcoholic beverages.  Do not smoke while alone.  Avoid making important decisions.  Plan to spend a quiet, relaxed evening @ home.  Resume normal activities as you begin to feel better.  Eat lightly for your first meal, then gradually increase your diet to what is normal for you.  In case of nausea, avoid food and drink only clear liquids.  Resume food as nausea ceases.  Notify your surgeon if you experience fever, chills, large amount of bleeding, difficulty breathing, persistent nausea and vomiting or any other disturbing problem.    Patient instructions for Post-Operative Care    [facility] Affix high-viscosity tissue adhesive(2-octyl cyanocrylate) is a sterile liquid skin adhesive that holds easily approximated wound edges together.  The adhesive forms a film that remains in place for 5-10 days and naturally sloughs off as the skin is renewed.    HOW TO CARE FOR YOUR WOUND     Keep the wound dry.  Do not soak or scrub the wound area.  To dry, gently pat with a soft towel or cloth.  If bandaged, keep the bandage dry or replace it if it becomes wet.     Avoid topical medications.  Do not apply liquid or ointment medications, lotions, creams, petroleum jelly, mineral oils or any other product to your wound while skin Affix adhesive film is in place.      Do not rub, scratch or pick at the wound.  Doing so may prevent the wound from closing appropriately and cause scarring.    Protect the wound from prolonged sunlight exposure.  Do not use tanning lamps while the film is in place.    Check the wound appearance.  Some swelling, redness and pain are common with all wounds and normally will go away as the wound heals.  Contact your doctor immediately if swelling, redness or pain increases; if the wound feels warm to the touch; or if the wound edges reopen or separate.    Skin Affix

## 2024-12-09 NOTE — PROGRESS NOTES
1051: Patient arrived in PACU from the OR s/p robotic ronan with poss liver biopsy. Received report from Cindy OLEA and Darian. Patient attached to monitor and all alarms are on.  1053: Patient awake, sleepy from anesthesia, follows commands. VSS. 98% on room air. Patient has x4 surgical sites on the abdomen, well approximated no drainage noted.   1055: POC glucose is 87.  1057: Patient c/o pain see mar.    1102: Patient re-medicated for pain se mar.   1107: Patient c/o pain see mar.   1115: Patient re-medicated for pain. Patient states painis in her back under her right rib and that she has had this pain for 2months.   1120: Patient turned/repositioned.   1125: Patient tolerating ice chips  1127: Patient re-medicated for pain see mar.   1130: Patient awake and alertx4, follows commands. VSS. 100% on room air. Patient has x4 surgical sites on the abdomen, well approximated no drainage noted. Breathing normal and unlabored, chest expansion symmetrical and trachea is midline.   1141: RN transported [facility] to [facility], bedside handoff report given to deloris OLEA. Call light in reach and beverage of choice provided.

## 2024-12-09 NOTE — PROGRESS NOTES
1143  Pt back to [facility] from PACU per cart. Pt is awake and alert--skin W&D. Pt denies nausea. Pt c/o pain 9 or 10/10 and appears to have been crying.  Pain is to Rt back at lower end of scapula. Report received from Esther OLEA. VS rechecked and stable.  Has lap sites x 4 to abd closed with glue and well-approximated with no drainage noted.  1150 Given crackers to eat and family to room.   1159 Pt medicated for pain per orders and ice pack placed to painful back area.  1205 Pt wanted to stand or walk with boyfriend's support in room.    1225 Report to Estelle OLEA for lunch coverage.  Pt denies any change in pain level.   1256 Pt sitting up in chair in room. Just medicated by Naveen OLEA for c/o nausea. Denies any change in pain level.   1325  Pt back on cart, sitting upright on bed. Appears more relaxed.  States that nausea has subsided and is tolerating ice chips. States that pain is improved to 7/10 and is ready to go home. IV's dc'd for pt to get dressed.   1340 Pt instructed for discharge care and follow-up and use of all Rx with understanding voiced.   1348 Pt ambulatory to car at exit after declining to ride in wheelchair. Gait steady and pt much more comfortable at this time.

## 2024-12-10 ENCOUNTER — TELEPHONE (OUTPATIENT)
Dept: SURGERY | Age: 34
End: 2024-12-10

## 2024-12-10 LAB — SURGICAL PATHOLOGY REPORT: NORMAL

## 2024-12-10 NOTE — TELEPHONE ENCOUNTER
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     I called the pt today to see how they were doing post-operatively.  The pt's pain is well controlled with current pain control regimen after being in the ER.   Pt's incisions are doing well. Denies erythema, swelling or drainage.   Pt is not tolerating eating without N/V, and is having bowel movements.   I reviewed the post-operative instructions, addressed any concerns and answered the patient's questions.     I confirmed the patient's post-op appointment on 12/1924      Went back to ER the night of surgery for vomiting. [facility]. Shot of phenergan, and dilaudid. With IV fluid. Since getting the shots feeling much better with nausea and vomiting      Antonia Hernandez MA

## 2025-01-13 ENCOUNTER — OFFICE VISIT (OUTPATIENT)
Dept: SURGERY | Age: 35
End: 2025-01-13

## 2025-01-13 VITALS
HEART RATE: 78 BPM | HEIGHT: 65 IN | SYSTOLIC BLOOD PRESSURE: 120 MMHG | BODY MASS INDEX: 22.56 KG/M2 | WEIGHT: 135.4 LBS | DIASTOLIC BLOOD PRESSURE: 80 MMHG | OXYGEN SATURATION: 99 %

## 2025-01-13 DIAGNOSIS — R10.84 GENERALIZED ABDOMINAL PAIN: Primary | ICD-10-CM

## 2025-01-13 DIAGNOSIS — Z09 POSTOP CHECK: ICD-10-CM

## 2025-01-13 PROCEDURE — 99024 POSTOP FOLLOW-UP VISIT: CPT | Performed by: NURSE PRACTITIONER

## 2025-01-13 RX ORDER — ONDANSETRON 4 MG/1
4 TABLET, FILM COATED ORAL EVERY 8 HOURS PRN
Qty: 20 TABLET | Refills: 1 | Status: SHIPPED | OUTPATIENT
Start: 2025-01-13 | End: 2025-01-27

## 2025-02-25 ENCOUNTER — HOSPITAL ENCOUNTER (EMERGENCY)
Age: 35
Discharge: HOME | End: 2025-02-26
Attending: EMERGENCY MEDICINE

## 2025-02-25 ENCOUNTER — APPOINTMENT (OUTPATIENT)
Dept: CT IMAGING | Age: 35
End: 2025-02-25

## 2025-02-25 DIAGNOSIS — K92.2 LOWER GI BLEED: Primary | ICD-10-CM

## 2025-02-25 DIAGNOSIS — K92.1 BLOOD IN THE STOOL: ICD-10-CM

## 2025-02-25 LAB
ALBUMIN SERPL-MCNC: 4.4 G/DL (ref 3.4–5)
ALBUMIN/GLOB SERPL: 1.8 {RATIO} (ref 1.1–2.2)
ALP SERPL-CCNC: 68 U/L (ref 40–129)
ALT SERPL-CCNC: 7 U/L (ref 10–40)
ANION GAP SERPL CALCULATED.3IONS-SCNC: 9 MMOL/L (ref 9–17)
AST SERPL-CCNC: 20 U/L (ref 15–37)
B-HCG SERPL EIA 3RD IS-ACNC: <1 MIU/ML
BASOPHILS # BLD: 0.03 K/UL
BASOPHILS NFR BLD: 0 % (ref 0–1)
BILIRUB SERPL-MCNC: 0.3 MG/DL (ref 0–1)
BUN SERPL-MCNC: 9 MG/DL (ref 7–20)
CALCIUM SERPL-MCNC: 9.5 MG/DL (ref 8.3–10.6)
CHLORIDE SERPL-SCNC: 104 MMOL/L (ref 99–110)
CO2 SERPL-SCNC: 29 MMOL/L (ref 21–32)
CREAT SERPL-MCNC: 0.8 MG/DL (ref 0.6–1.1)
DATE, STOOL #1: ABNORMAL
EOSINOPHIL # BLD: 0.13 K/UL
EOSINOPHILS RELATIVE PERCENT: 1 % (ref 0–3)
ERYTHROCYTE [DISTWIDTH] IN BLOOD BY AUTOMATED COUNT: 13.1 % (ref 11.7–14.9)
GFR, ESTIMATED: 86 ML/MIN/1.73M2
GLUCOSE SERPL-MCNC: 91 MG/DL (ref 74–99)
HCT VFR BLD AUTO: 37.3 % (ref 37–47)
HEMOCCULT SP1 STL QL: POSITIVE
HGB BLD-MCNC: 12.1 G/DL (ref 12.5–16)
IMM GRANULOCYTES # BLD AUTO: 0.03 K/UL
IMM GRANULOCYTES NFR BLD: 0 %
LACTATE BLDV-SCNC: 0.9 MMOL/L (ref 0.4–2)
LIPASE SERPL-CCNC: 33 U/L (ref 13–60)
LYMPHOCYTES NFR BLD: 3.04 K/UL
LYMPHOCYTES RELATIVE PERCENT: 30 % (ref 24–44)
MCH RBC QN AUTO: 29.4 PG (ref 27–31)
MCHC RBC AUTO-ENTMCNC: 32.4 G/DL (ref 32–36)
MCV RBC AUTO: 90.8 FL (ref 78–100)
MONOCYTES NFR BLD: 0.63 K/UL
MONOCYTES NFR BLD: 6 % (ref 0–4)
NEUTROPHILS NFR BLD: 62 % (ref 36–66)
NEUTS SEG NFR BLD: 6.35 K/UL
PLATELET # BLD AUTO: 218 K/UL (ref 140–440)
PMV BLD AUTO: 10.3 FL (ref 7.5–11.1)
POTASSIUM SERPL-SCNC: 3.7 MMOL/L (ref 3.5–5.1)
PROT SERPL-MCNC: 6.9 G/DL (ref 6.4–8.2)
RBC # BLD AUTO: 4.11 M/UL (ref 4.2–5.4)
SODIUM SERPL-SCNC: 141 MMOL/L (ref 136–145)
TIME, STOOL #1: 2317
WBC OTHER # BLD: 10.2 K/UL (ref 4–10.5)

## 2025-02-25 PROCEDURE — 87324 CLOSTRIDIUM AG IA: CPT

## 2025-02-25 PROCEDURE — 87507 IADNA-DNA/RNA PROBE TQ 12-25: CPT

## 2025-02-25 PROCEDURE — 2580000003 HC RX 258: Performed by: EMERGENCY MEDICINE

## 2025-02-25 PROCEDURE — 6360000004 HC RX CONTRAST MEDICATION: Performed by: EMERGENCY MEDICINE

## 2025-02-25 PROCEDURE — 6360000002 HC RX W HCPCS: Performed by: EMERGENCY MEDICINE

## 2025-02-25 PROCEDURE — 84702 CHORIONIC GONADOTROPIN TEST: CPT

## 2025-02-25 PROCEDURE — 83605 ASSAY OF LACTIC ACID: CPT

## 2025-02-25 PROCEDURE — 96374 THER/PROPH/DIAG INJ IV PUSH: CPT

## 2025-02-25 PROCEDURE — 80053 COMPREHEN METABOLIC PANEL: CPT

## 2025-02-25 PROCEDURE — 74177 CT ABD & PELVIS W/CONTRAST: CPT

## 2025-02-25 PROCEDURE — 83690 ASSAY OF LIPASE: CPT

## 2025-02-25 PROCEDURE — 99285 EMERGENCY DEPT VISIT HI MDM: CPT

## 2025-02-25 PROCEDURE — 87449 NOS EACH ORGANISM AG IA: CPT

## 2025-02-25 PROCEDURE — 82272 OCCULT BLD FECES 1-3 TESTS: CPT

## 2025-02-25 PROCEDURE — 85025 COMPLETE CBC W/AUTO DIFF WBC: CPT

## 2025-02-25 RX ORDER — ONDANSETRON 2 MG/ML
4 INJECTION INTRAMUSCULAR; INTRAVENOUS ONCE
Status: COMPLETED | OUTPATIENT
Start: 2025-02-25 | End: 2025-02-25

## 2025-02-25 RX ORDER — IOPAMIDOL 755 MG/ML
75 INJECTION, SOLUTION INTRAVASCULAR
Status: COMPLETED | OUTPATIENT
Start: 2025-02-25 | End: 2025-02-25

## 2025-02-25 RX ORDER — 0.9 % SODIUM CHLORIDE 0.9 %
1000 INTRAVENOUS SOLUTION INTRAVENOUS ONCE
Status: COMPLETED | OUTPATIENT
Start: 2025-02-25 | End: 2025-02-25

## 2025-02-25 RX ADMIN — ONDANSETRON 4 MG: 2 INJECTION INTRAMUSCULAR; INTRAVENOUS at 20:00

## 2025-02-25 RX ADMIN — SODIUM CHLORIDE 1000 ML: 9 INJECTION, SOLUTION INTRAVENOUS at 20:00

## 2025-02-25 RX ADMIN — IOPAMIDOL 75 ML: 755 INJECTION, SOLUTION INTRAVENOUS at 21:41

## 2025-02-25 ASSESSMENT — PAIN SCALES - GENERAL: PAINLEVEL_OUTOF10: 0

## 2025-02-25 ASSESSMENT — PAIN - FUNCTIONAL ASSESSMENT
PAIN_FUNCTIONAL_ASSESSMENT: NONE - DENIES PAIN
PAIN_FUNCTIONAL_ASSESSMENT: 0-10

## 2025-02-26 VITALS
WEIGHT: 126 LBS | HEART RATE: 54 BPM | BODY MASS INDEX: 20.97 KG/M2 | OXYGEN SATURATION: 96 % | RESPIRATION RATE: 16 BRPM | SYSTOLIC BLOOD PRESSURE: 90 MMHG | DIASTOLIC BLOOD PRESSURE: 74 MMHG | TEMPERATURE: 98.4 F

## 2025-02-26 LAB
ADV 40+41 DNA STL QL NAA+NON-PROBE: NOT DETECTED
C CAYETANENSIS DNA STL QL NAA+NON-PROBE: NOT DETECTED
C COLI+JEJ+UPSA DNA STL QL NAA+NON-PROBE: NOT DETECTED
C DIFF GDH + TOXINS A+B STL QL IA.RAPID: NEGATIVE
CRYPTOSP DNA STL QL NAA+NON-PROBE: NOT DETECTED
E COLI O157 DNA STL QL NAA+NON-PROBE: NOT DETECTED
E HISTOLYT DNA STL QL NAA+NON-PROBE: NOT DETECTED
EAEC PAA PLAS AGGR+AATA ST NAA+NON-PRB: NOT DETECTED
EC STX1+STX2 GENES STL QL NAA+NON-PROBE: NOT DETECTED
EPEC EAE GENE STL QL NAA+NON-PROBE: NOT DETECTED
ETEC LTA+ST1A+ST1B TOX ST NAA+NON-PROBE: NOT DETECTED
G LAMBLIA DNA STL QL NAA+NON-PROBE: NOT DETECTED
GI PATH DNA+RNA PNL STL NAA+NON-PROBE: NOT DETECTED
LACTOFERRIN STL QL: POSITIVE
NOROVIRUS GI+II RNA STL QL NAA+NON-PROBE: NOT DETECTED
P SHIGELLOIDES DNA STL QL NAA+NON-PROBE: NOT DETECTED
RVA RNA STL QL NAA+NON-PROBE: NOT DETECTED
S ENT+BONG DNA STL QL NAA+NON-PROBE: NOT DETECTED
SAPO I+II+IV+V RNA STL QL NAA+NON-PROBE: NOT DETECTED
SOURCE: NORMAL
SPECIMEN DESCRIPTION: NORMAL
V CHOL+PARA+VUL DNA STL QL NAA+NON-PROBE: NOT DETECTED
V CHOLERAE DNA STL QL NAA+NON-PROBE: NOT DETECTED
Y ENTEROCOL DNA STL QL NAA+NON-PROBE: NOT DETECTED

## 2025-02-26 PROCEDURE — 83630 LACTOFERRIN FECAL (QUAL): CPT

## 2025-02-26 ASSESSMENT — PAIN SCALES - GENERAL: PAINLEVEL_OUTOF10: 0

## 2025-02-26 ASSESSMENT — PAIN - FUNCTIONAL ASSESSMENT: PAIN_FUNCTIONAL_ASSESSMENT: 0-10

## 2025-02-26 NOTE — ED PROVIDER NOTES
CARE RECEIVED FROM: Dr. Styles  I reviewed the carlos elements of the history, physical exam and initial treatment plan at the bedside.  ANCILLARY DATA:  I reviewed the images. Radiologist interpretation:   CT ABDOMEN PELVIS W IV CONTRAST Additional Contrast? None   Final Result        Labs Reviewed   CBC WITH AUTO DIFFERENTIAL - Abnormal; Notable for the following components:       Result Value    RBC 4.11 (*)     Hemoglobin 12.1 (*)     Monocytes % 6 (*)     All other components within normal limits   COMPREHENSIVE METABOLIC PANEL - Abnormal; Notable for the following components:    ALT 7 (*)     All other components within normal limits   BLOOD OCCULT STOOL DIAGNOSTIC - Abnormal; Notable for the following components:    Occult Blood, Stool #1 POSITIVE (*)     All other components within normal limits   GASTROINTESTINAL PANEL, MOLECULAR   GIARDIA ANTIGEN   OVA AND PARASITE SCREEN   C DIFF TOXIN/ANTIGEN   GASTROINTESTINAL PANEL, MOLECULAR   LIPASE   LACTIC ACID   HCG, QUANTITATIVE, PREGNANCY   CALPROTECTIN STOOL   FECAL LACTOFERRIN     MEDICAL DECISION MAKING / PLAN:    This is a 34-year-old female that presented to the emergency department with complaints of episodes of rectal bleeding.  She has experienced similar symptoms previously and is currently established with [facility] seeing [facility].  Her workup here included stable hemoglobin level and a CT of the abdomen/pelvis which was without acute intra-abdominal pathology.  There is radiology read for possible inflammation of left labia with cellulitis although on clinical exam she had no evidence of cellulitis.  Patient was offered hospitalization by Dr. Styles from the ER.    I was approached by Dr. Diehl of hospitalist team stating that patient would rather be discharged and follow-up as an outpatient.  I spoke with the patient at bedside and she states that she would like to have her GI workup completed as an outpatient.  She is currently hemodynamically

## 2025-02-26 NOTE — ED NOTES
Abnormal; Notable for the following components:    ALT 7 (*)     All other components within normal limits   BLOOD OCCULT STOOL DIAGNOSTIC - Abnormal; Notable for the following components:    Occult Blood, Stool #1 POSITIVE (*)     All other components within normal limits        Background  History:   Past Medical History:   Diagnosis Date    Depression     Diabetes mellitus (HCC)     type 2-diet controlled.\"had gestational diabetes and my hbgA1c was 6.1- off medications for the past 2 yrs now just diet controlled    GERD (gastroesophageal reflux disease)     Group B streptococcal infection during pregnancy      (normal spontaneous vaginal delivery) 2015    delivered in ambulance 12 minutes    Obesity, unspecified     Pancreatitis chronic     Problems with hearing     dr nunez; only due to when she had a cold       Assessment    Vitals: MEWS Score: 1  Level of Consciousness: Alert (0)   Vitals:    25 2202 25 2212 25 2222 25 2302   BP: 119/64 123/71 (!) 114/55 129/70   Pulse:       Resp:       Temp:       SpO2: 98% 97% 95% 97%   Weight:           PO Status: Nothing by Mouth    O2 Flow Rate: O2 Device: None (Room air)      Cardiac Rhythm: sr    Last documented pain medication administered: none    NIH Score: NIH       Active LDA's:   Peripheral IV 25 Right Antecubital (Active)   Site Assessment Clean, dry & intact 25   Line Status Flushed;Normal saline locked 25   Line Care Connections checked and tightened 25   Phlebitis Assessment No symptoms 25   Infiltration Assessment 0 25   Dressing Status New dressing applied 25   Dressing Type Transparent 25   Dressing Intervention New 25       Pertinent or High Risk Medications/Drips: no   If Yes, please provide details:       Blood Product Administration: no  If Yes, please provide details:     Recommendation    Incomplete orders admit orders

## 2025-02-26 NOTE — ED PROVIDER NOTES
Emergency Department Encounter    Patient: Judy Frost  MRN: [id number]  : 1990  Date of Evaluation: 2025  ED Provider:  Maral Styles DO    Triage Chief Complaint:   Rectal Bleeding    [place]:  Judy Frost is a 34 y.o. female with history of anxiety depression acid reflux pancreatitis hemorrhoids status postcholecystectomy, history of gastric sleeve surgery, hemorrhoid surgery that presents to the emergency department complaining of multiple episodes of blood and mucus from rectum.  Patient states she has had about 5 episodes of that today.  Patient states she is not having much stool come out just mucus pus and blood.  Patient states she has had some nausea and vomiting.  She states no rectal pain.  She states no blood in stool and pus comes out without warning.  Patient states she has history of stomach issues and currently awaiting a bypass surgery at [facility].  She denies any abdominal pain no fever chills cough sore throat runny nose earache.  She denies any dysuria hematuria.  States last menstrual period 2025.  Patient states she called her gastroenterologist Dr. Youssef and was told to come to the emergency department for evaluation.  Patient states she did go to outlying facility prior to arrival here.  Patient here for evaluation.    ROS - see HPI, below listed is current ROS at time of my eval:  10 systems reviewed and negative except as above.     Past Medical History:   Diagnosis Date    Depression     Diabetes mellitus (HCC)     type 2-diet controlled.\"had gestational diabetes and my hbgA1c was 6.1- off medications for the past 2 yrs now just diet controlled    GERD (gastroesophageal reflux disease)     Group B streptococcal infection during pregnancy      (normal spontaneous vaginal delivery) 2015    delivered in ambulance 12 minutes    Obesity, unspecified     Pancreatitis chronic     Problems with hearing     dr nunez; only due to when she had a cold     Past

## 2025-06-06 ENCOUNTER — TELEPHONE (OUTPATIENT)
Dept: CARDIOLOGY CLINIC | Age: 35
End: 2025-06-06

## 2025-06-06 DIAGNOSIS — R00.1 BRADYCARDIA: Primary | ICD-10-CM

## 2025-06-06 NOTE — TELEPHONE ENCOUNTER
Called [facility], we received a fax with a pt name and MRN that is not accessible in our system, need  to find pt and upload and schedule. Spoke to Lissette gave their MRN received Pt  of 91 to follow through with order. Uploaded fax into media.      Spoke to pt, scheduled pt in for 25 @ 8:30 am, placed order for monitor as well.

## 2025-06-09 ENCOUNTER — HOSPITAL ENCOUNTER (OUTPATIENT)
Age: 35
Discharge: HOME | End: 2025-06-09

## 2025-06-10 ENCOUNTER — TRANSCRIBE ORDERS (OUTPATIENT)
Dept: ADMINISTRATIVE | Age: 35
End: 2025-06-10

## 2025-06-10 ENCOUNTER — TELEPHONE (OUTPATIENT)
Dept: CARDIOLOGY CLINIC | Age: 35
End: 2025-06-10

## 2025-06-10 DIAGNOSIS — Z12.31 ENCOUNTER FOR SCREENING MAMMOGRAM FOR MALIGNANT NEOPLASM OF BREAST: Primary | ICD-10-CM

## 2025-06-10 NOTE — TELEPHONE ENCOUNTER
Clinical Justification fax received, order printed and faxed in care of Nicole Gibson. Media uploaded, also received conversion fax from [facility] to [facility], media uploaded.

## 2025-06-12 ENCOUNTER — TELEPHONE (OUTPATIENT)
Dept: BARIATRICS/WEIGHT MGMT | Age: 35
End: 2025-06-12

## 2025-06-12 DIAGNOSIS — Z98.84 HX OF BARIATRIC SURGERY: Primary | ICD-10-CM

## 2025-06-24 ENCOUNTER — TELEPHONE (OUTPATIENT)
Dept: CARDIOLOGY CLINIC | Age: 35
End: 2025-06-24

## 2025-07-15 RX ORDER — CALCIUM CARBONATE 500 MG/1
1 TABLET, CHEWABLE ORAL DAILY
COMMUNITY

## (undated) DEVICE — SUTURE SZ 0 27IN 5/8 CIR UR-6  TAPER PT VIOLET ABSRB VICRYL J603H

## (undated) DEVICE — ENDOSCOPY KIT: Brand: MEDLINE INDUSTRIES, INC.

## (undated) DEVICE — COLUMN DRAPE

## (undated) DEVICE — SEAL

## (undated) DEVICE — Device

## (undated) DEVICE — Z DISCONTINUED (USE MFG CAT MVABO)  TUBING GAS SAMPLING STD 6.5 FT FEMALE CONN SMRT CAPNOLINE

## (undated) DEVICE — NEEDLE HYPO 21GA L1.5IN GRN POLYPR HUB S STL REG BVL STR

## (undated) DEVICE — LAPAROSCOPIC TROCAR SLEEVE/SINGLE USE: Brand: KII® OPTICAL ACCESS SYSTEM

## (undated) DEVICE — SOLUTION IRRIG 1000ML STRL H2O USP PLAS POUR BTL

## (undated) DEVICE — PACK SURG LAP CHOLE

## (undated) DEVICE — SYRINGE ONLY,20ML LUER LOCK: Brand: MEDLINE INDUSTRIES, INC.

## (undated) DEVICE — STAPLER 60 RELOAD GREEN: Brand: SUREFORM

## (undated) DEVICE — APPLICATOR MEDICATED 26 CC SOLUTION HI LT ORNG CHLORAPREP

## (undated) DEVICE — SUTURE STRATAFIX SPRL PDS + VLT 3-0 15CM DISPOSABLE/SNGLE SXPP1B420

## (undated) DEVICE — CHLORAPREP 26ML ORANGE

## (undated) DEVICE — SUTURE VCRL SZ 4-0 L18IN ABSRB UD L19MM PS-2 3/8 CIR PRIM J496H

## (undated) DEVICE — FORCEPS BX L240CM JAW DIA2.8MM L CAP W/ NDL MIC MESH TOOTH

## (undated) DEVICE — 2, DISPOSABLE SUCTION/IRRIGATOR WITH DISPOSABLE TIP: Brand: STRYKEFLOW

## (undated) DEVICE — GLOVE SURG SZ 7 L12IN FNGR THK79MIL GRN LTX FREE

## (undated) DEVICE — LINER,SEMI-RIGID,3000CC,50EA/CS: Brand: MEDLINE

## (undated) DEVICE — SUTURE VICRYL + SZ 0 L27IN ABSRB VLT L26MM UR-6 5/8 CIR VCP603H

## (undated) DEVICE — LARGE SUTURE CUT NEEDLE DRIVER: Brand: ENDOWRIST

## (undated) DEVICE — PROGRASP FORCEPS: Brand: ENDOWRIST

## (undated) DEVICE — SUTURE VICRYL SZ 4-0 L18IN ABSRB UD L19MM PS-2 3/8 CIR PRIM J496H

## (undated) DEVICE — GOWN,ECLIPSE,POLYRNF,BRTHSLV,XL,30/CS: Brand: MEDLINE

## (undated) DEVICE — ARM DRAPE

## (undated) DEVICE — TISSUE RETRIEVAL SYSTEM: Brand: INZII RETRIEVAL SYSTEM

## (undated) DEVICE — NEEDLE HYPO 20GA L1.5IN YEL POLYPR HUB S STL REG BVL STR

## (undated) DEVICE — CADIERE FORCEPS: Brand: ENDOWRIST

## (undated) DEVICE — STAPLER 60 RELOAD BLUE: Brand: SUREFORM

## (undated) DEVICE — SET TBNG DISP TIP FOR AHTO

## (undated) DEVICE — TUBING INSUFFLATOR HEAT HI FLO SET PNEUMOCLEAR

## (undated) DEVICE — GLOVE ORANGE PI 7   MSG9070

## (undated) DEVICE — STERILE LATEX POWDER-FREE SURGICAL GLOVESWITH NITRILE COATING: Brand: PROTEXIS

## (undated) DEVICE — SUREFORM 45: Brand: SUREFORM

## (undated) DEVICE — TOWEL,OR,DSP,ST,BLUE,STD,6/PK,12PK/CS: Brand: MEDLINE

## (undated) DEVICE — ELECTRODE ES AD CRDLSS PT RET REM POLYHESIVE

## (undated) DEVICE — ADHESIVE SKIN CLSR 0.7ML TOP DERMBND ADV

## (undated) DEVICE — POSITIONER HD AD W4.5XH8XL9IN HIGHLY RESILIENT FOAM CMFRT

## (undated) DEVICE — BLADELESS OBTURATOR: Brand: WECK VISTA

## (undated) DEVICE — TUBING, SUCTION, 9/32" X 10', STRAIGHT: Brand: MEDLINE

## (undated) DEVICE — REDUCER: Brand: ENDOWRIST

## (undated) DEVICE — DUAL LUMEN STOMACH TUBE: Brand: SALEM SUMP

## (undated) DEVICE — CANNULA SEAL

## (undated) DEVICE — SNARE ENDOSCP L240CM SHTH DIA24MM LOOP W10MM POLYP RND REINF

## (undated) DEVICE — ADHESIVE SKIN CLOSURE WND 8.661X1.5 IN 22 CM LIQUIBAND SECUR

## (undated) DEVICE — GOWN,SIRUS,POLYRNF,BRTHSLV,XLN/XL,20/CS: Brand: MEDLINE

## (undated) DEVICE — GLOVE ORANGE PI 7 1/2   MSG9075

## (undated) DEVICE — GLOVE SURG SZ 8 L12IN THK75MIL DK GRN LTX FREE

## (undated) DEVICE — GLOVE SURG SZ 7 L12IN FNGR THK87MIL WHT LTX FREE

## (undated) DEVICE — SYRINGE MED 20ML STD CLR PLAS LUERLOCK TIP N CTRL DISP

## (undated) DEVICE — TUBING FLTR PLUME AWAY EVAC W/ SUCT DEV DISP PUREVIEW

## (undated) DEVICE — GLOVE SURG SZ 75 L12IN FNGR THK79MIL GRN LTX FREE

## (undated) DEVICE — PROTECTOR EYE PT SELF ADH NS OPT GRD LF

## (undated) DEVICE — GLOVE SURG SZ 7 CRM LTX FREE POLYISOPRENE POLYMER BEAD ANTI